# Patient Record
Sex: MALE | Race: BLACK OR AFRICAN AMERICAN | Employment: OTHER | URBAN - METROPOLITAN AREA
[De-identification: names, ages, dates, MRNs, and addresses within clinical notes are randomized per-mention and may not be internally consistent; named-entity substitution may affect disease eponyms.]

---

## 2017-07-07 ENCOUNTER — TELEPHONE (OUTPATIENT)
Dept: FAMILY MEDICINE CLINIC | Age: 62
End: 2017-07-07

## 2017-07-07 ENCOUNTER — OFFICE VISIT (OUTPATIENT)
Dept: FAMILY MEDICINE CLINIC | Age: 62
End: 2017-07-07

## 2017-07-07 VITALS
HEART RATE: 71 BPM | TEMPERATURE: 97.8 F | RESPIRATION RATE: 16 BRPM | OXYGEN SATURATION: 97 % | WEIGHT: 236 LBS | BODY MASS INDEX: 35.77 KG/M2 | HEIGHT: 68 IN | DIASTOLIC BLOOD PRESSURE: 75 MMHG | SYSTOLIC BLOOD PRESSURE: 129 MMHG

## 2017-07-07 DIAGNOSIS — I10 ESSENTIAL HYPERTENSION: Primary | ICD-10-CM

## 2017-07-07 DIAGNOSIS — Z79.4 TYPE 2 DIABETES MELLITUS WITHOUT COMPLICATION, WITH LONG-TERM CURRENT USE OF INSULIN (HCC): ICD-10-CM

## 2017-07-07 DIAGNOSIS — E11.9 TYPE 2 DIABETES MELLITUS WITHOUT COMPLICATION, WITH LONG-TERM CURRENT USE OF INSULIN (HCC): ICD-10-CM

## 2017-07-07 DIAGNOSIS — M25.551 RIGHT HIP PAIN: ICD-10-CM

## 2017-07-07 RX ORDER — INSULIN ASPART 100 [IU]/ML
14 INJECTION, SOLUTION INTRAVENOUS; SUBCUTANEOUS ONCE
COMMUNITY
End: 2020-10-07

## 2017-07-07 RX ORDER — AMLODIPINE BESYLATE 10 MG/1
TABLET ORAL
Qty: 90 TAB | Refills: 3 | Status: CANCELLED | OUTPATIENT
Start: 2017-07-07

## 2017-07-07 NOTE — PROGRESS NOTES
HISTORY OF PRESENT ILLNESS  Saniya Mcclendon is a 58 y.o. male. Chief Complaint   Patient presents with    Follow-up    Diabetes     Type 2, under care of Endocrinology    Hip Pain     right x 3-4 weeks pt reports the pain increases after sitting for long periods of time. Patient denies any pain currently. HPI   Pt is here for follow up of Diabetes Type 2, and is under the care of Endcrinology. Pt does not need medication refills today. New concerns today: Patient states he has been having right hip pain x 3-4 weeks, and increases with prolonged sitting. Review of Systems   Constitutional: Negative. HENT: Negative. Respiratory: Negative. Cardiovascular: Negative. Musculoskeletal: Positive for joint pain. All other systems reviewed and are negative. Physical Exam  Nursing note and vitals reviewed. Constitutional: He is oriented to person, place, and time. He appears well-developed and well-nourished. HENT:   Head: Normocephalic and atraumatic. Right Ear: External ear normal.   Left Ear: External ear normal.   Nose: Nose normal.   Eyes: Conjunctivae and EOM are normal.   Neck: Normal range of motion. Neck supple. No JVD present. Carotid bruit is not present. No thyromegaly present. Cardiovascular: Normal rate, regular rhythm, normal heart sounds and intact distal pulses. Exam reveals no gallop and no friction rub. No murmur heard. Pulmonary/Chest: Effort normal and breath sounds normal. He has no wheezes. He has no rhonchi. He has no rales. Abdominal: Soft. Musculoskeletal: Normal range of motion. Neurological: He is alert and oriented to person, place, and time. Coordination normal.   Skin: Skin is warm and dry. Psychiatric: He has a normal mood and affect. His behavior is normal. Judgment and thought content normal.       ASSESSMENT and PLAN  Mu Woodard was seen today for follow-up, diabetes and hip pain.     Diagnoses and all orders for this visit:    Essential hypertension  Stable, cont pres tx plan. Recent labs with endo. Type 2 diabetes mellitus without complication, with long-term current use of insulin (HCC)  Stable, care as per endo. Recent A1c near goal.      Right hip pain  -     XR HIP RT W OR WO PELV 2-3 VWS; Future  Will f/u as indicated. Discussed meds vs ortho vs PT.         Follow-up Disposition: 3 months; sooner prn

## 2017-07-07 NOTE — PROGRESS NOTES
Faustino Hodgkin 58 y.o. male   Chief Complaint   Patient presents with    Follow-up    Diabetes     Type 2, under care of Endocrinology    Hip Pain     right x 3-4 weeks pt reports the pain increases after sitting for long periods of time. Patient denies any pain currently. 1. Have you been to the ER, urgent care clinic since your last visit? Hospitalized since your last visit? No    2. Have you seen or consulted any other health care providers outside of the [address] [place] [address] since your last visit? Include any pap smears or colon screening.  No

## 2018-10-26 ENCOUNTER — DOCUMENTATION ONLY (OUTPATIENT)
Dept: FAMILY MEDICINE CLINIC | Age: 63
End: 2018-10-26

## 2019-09-10 LAB
HBA1C MFR BLD HPLC: 10.4 %
LDL-C, EXTERNAL: 65

## 2020-08-10 ENCOUNTER — TELEPHONE (OUTPATIENT)
Dept: FAMILY MEDICINE CLINIC | Age: 65
End: 2020-08-10

## 2020-08-10 NOTE — TELEPHONE ENCOUNTER
Called patient related to appointment scheduled for tomorrow. Message left to return call to office.

## 2020-08-11 ENCOUNTER — VIRTUAL VISIT (OUTPATIENT)
Dept: FAMILY MEDICINE CLINIC | Age: 65
End: 2020-08-11

## 2020-08-11 DIAGNOSIS — J01.90 ACUTE NON-RECURRENT SINUSITIS, UNSPECIFIED LOCATION: ICD-10-CM

## 2020-08-11 DIAGNOSIS — R35.1 NOCTURIA MORE THAN TWICE PER NIGHT: ICD-10-CM

## 2020-08-11 DIAGNOSIS — E11.9 TYPE 2 DIABETES MELLITUS WITHOUT COMPLICATION, WITH LONG-TERM CURRENT USE OF INSULIN (HCC): ICD-10-CM

## 2020-08-11 DIAGNOSIS — Z79.4 TYPE 2 DIABETES MELLITUS WITHOUT COMPLICATION, WITH LONG-TERM CURRENT USE OF INSULIN (HCC): ICD-10-CM

## 2020-08-11 DIAGNOSIS — I10 ESSENTIAL HYPERTENSION: Primary | ICD-10-CM

## 2020-08-11 RX ORDER — PEN NEEDLE, DIABETIC 31 GX3/16"
NEEDLE, DISPOSABLE MISCELLANEOUS
COMMUNITY
Start: 2020-05-23 | End: 2020-10-07

## 2020-08-11 RX ORDER — BLOOD SUGAR DIAGNOSTIC
STRIP MISCELLANEOUS
COMMUNITY
Start: 2020-07-07 | End: 2020-10-07

## 2020-08-11 RX ORDER — AMOXICILLIN 875 MG/1
875 TABLET, FILM COATED ORAL 2 TIMES DAILY
Qty: 20 TAB | Refills: 0 | Status: SHIPPED | OUTPATIENT
Start: 2020-08-11 | End: 2020-08-21

## 2020-08-11 RX ORDER — TELMISARTAN 40 MG/1
TABLET ORAL
COMMUNITY
Start: 2020-07-22 | End: 2020-10-07

## 2020-08-11 NOTE — PROGRESS NOTES
Otilio Torres presents today for   Chief Complaint   Patient presents with    Hypertension     Visit to re-establish care and follow up.  Diabetes     Follow up, patient states that he is seeing Dr. Rashmi Henderson regularly (4 weeks ago)    Pressure Behind the Eyes     c/o sinus pressure for about 3 weeks, states drainage at times, thick at times and has an odor at times. Otilio Torres preferred language for health care discussion is english/other. Is someone accompanying this pt? Wife Audra Ruffin. Is the patient using any DME equipment during OV? No. Patient states that he uses his wife as a guide due to blindness. Depression Screening:  3 most recent PHQ Screens 8/11/2020   Little interest or pleasure in doing things Not at all   Feeling down, depressed, irritable, or hopeless Not at all   Total Score PHQ 2 0       Learning Assessment:  Learning Assessment 6/13/2014   PRIMARY LEARNER Patient   HIGHEST LEVEL OF EDUCATION - PRIMARY LEARNER  DID NOT GRADUATE [facility] PRIMARY LEARNER VISUAL     READING   [address]  CAREGIVER Yes   CO-LEARNER NAME [address] [facility] LEVEL OF EDUCATION 2 YEARS OF COLLEGE   BARRIERS CO-LEARNER NONE   PRIMARY LANGUAGE ENGLISH   PRIMARY LANGUAGE CO-LEARNER ENGLISH    NEED No   LEARNER PREFERENCE PRIMARY DEMONSTRATION     LISTENING   LEARNER PREFERENCE CO-LEARNER READING   LEARNING SPECIAL TOPICS none   ANSWERED BY patient   RELATIONSHIP SELF       Abuse Screening:  Abuse Screening Questionnaire 8/11/2020   Do you ever feel afraid of your partner? N   Are you in a relationship with someone who physically or mentally threatens you? N   Is it safe for you to go home? Y       Fall Risk  Fall Risk Assessment, last 12 mths 8/11/2020   Able to walk? Yes   Fall in past 12 months? No         Coordination of Care:  1. Have you been to the ER, urgent care clinic since your last visit? Hospitalized since your last visit? No    2.  Have you seen or consulted any other health care providers outside of the [facility] since your last visit? Include any pap smears or colon screening. Patient states that he sees Dr. Diego Fernandez regularly related to diabetes. Most recent visit 4 weeks ago. Advance Directive:  1. Do you have an advance directive in place? Patient Reply:No    2. If not, would you like material regarding how to put one in place?  Patient Reply: No

## 2020-08-11 NOTE — PROGRESS NOTES
Doug Dobbs is a 72 y.o. male, evaluated via audio-only technology on 8/11/2020 for Hypertension (Visit to re-establish care and follow up. ); Diabetes (Follow up, patient states that he is seeing Dr. Suha Pro regularly (4 weeks ago)); and Pressure Behind the Eyes (c/o sinus pressure for about 3 weeks, states drainage at times, thick at times and has an odor at times. )  . Assessment & Plan:   Diagnoses and all orders for this visit:    1. Essential hypertension  Stable, cont pres tx plan. 2. Nocturia more than twice per night  -     REFERRAL TO UROLOGY  Suspect bph.    3. Type 2 diabetes mellitus without complication, with long-term current use of insulin ([facility] [address]  Care as per endo    4. Acute non-recurrent sinusitis, unspecified location  -     amoxicillin (AMOXIL) 875 mg tablet; Take 1 Tab by mouth two (2) times a day for 10 days. The complexity of medical decision making for this visit is moderate   Follow-up and Dispositions    · Return in about 3 months (around 11/11/2020), or if symptoms worsen or fail to improve, for high blood pressure, diabetes. 12  Subjective:   Pt wants to re-establish care. Pt has been seeing endo regularly. His dm is well controlled. His last visit was about 4 wks ago. Am blood sugar readings are usually around 115. Before lunch, he is up slightly to 120-130s. Pt has not been checking bp readings at home. They will consider getting a new bp cuff. Pt reports low back pain that is worse at night. It goes around to the bottom of his stomach. Pt is having to urinate 8-9 times during the night. He does not go as often during the day. Pt does not have any trouble initiating his stream.  He does not always feel like he empties completely; he may have to strain to finish emptying his bladder. He does not have much terminal dribbling. Pt c/o sinus pressure with drainage. He feels very congested. Pt has pnd with cough.   The cough is productive at times. The pain is behind his nose, between his eyes. No pain in the maxillary sinuses, upper teeth, or frontal sinus. He has been using an otc sinus prep without much relief. Prior to Admission medications    Medication Sig Start Date End Date Taking? Authorizing Provider   telmisartan (MICARDIS) 40 mg tablet take 1 tablet by mouth once daily 7/22/20  Yes Provider, Historical   Droplet Pen Needle 31 gauge x 3/16\" ndle  5/23/20  Yes Provider, Historical   Accu-Chek Gisele Plus test strp strip  7/7/20  Yes Provider, Historical   insulin aspart, niacinamide, (FIASP U-100 INSULIN SC) by SubCUTAneous route. Yes Provider, Historical   amoxicillin (AMOXIL) 875 mg tablet Take 1 Tab by mouth two (2) times a day for 10 days. 8/11/20 8/21/20 Yes Carl Dillard MD TOUJEO SOLOSTAR 300 unit/mL (1.5 mL) inpn 60 Units by SubCUTAneous route nightly. 6/24/16  Yes Provider, Historical   hydrochlorothiazide (HYDRODIURIL) 25 mg tablet TAKE 1 TABLET EVERY DAY 5/13/15  Yes Carl Dillard MD   amLODIPine (NORVASC) 10 mg tablet TAKE 1 TABLET EVERY DAY 5/13/15  Yes Mart Iraheta MD   ALCOHOL PREP PADS padm 1 each. Cleanse skin area prior to injection and prior to testing blood sugar daily as directed 7/29/14  Yes Provider, Historical   SOLUS V2 LANCING DEVICE kit 1 Each two (2) times a day. Test blood sugar .02 4/25/14  Yes Provider, Historical   SHERLEY PEN NEEDLE 32 x 5/32 \" ndle 30 Units by SubCUTAneous route daily. At bedtime 5/12/14  Yes Provider, Historical   glimepiride (AMARYL) 2 mg tablet 2 mg every morning. Indications: TYPE 2 DIABETES MELLITUS 5/22/14  Yes Provider, Historical   metFORMIN (GLUCOPHAGE) 1,000 mg tablet Take 1 Tab by mouth two (2) times daily (with meals). Indications: TYPE 2 DIABETES MELLITUS 5/24/13  Yes Carl Dillard MD   PYRIDOXINE HCL, VITAMIN B6, (PYRIDOXINE, VITAMIN B6, PO) Take  by mouth.     Provider, Historical   insulin aspart (NOVOLOG) 100 unit/mL injection 14 Units by SubCUTAneous route once. At dinner time    Provider, Historical   polyethylene glycol (MIRALAX) 17 gram packet MIX CONTENT OF 1 PACKET IN 4 TO 8 OUNCES OF LIQUID AND DRINK DAILY FOR UP TO 7 DAYS. MAY NEED 1 TO 3 DAYS FOR RESULTS 5/13/15   Liza Salmon MD   b complex vitamins (B COMPLEX-VITAMIN B12) tablet Take 1 tablet by mouth daily. Provider, Historical   FOLIC ACID PO Take  by mouth daily. Provider, Historical   Cholecalciferol, Vitamin D3, 5,000 unit Tab Take 5,000 Units by mouth daily. Provider, Historical   MEN'S MULTI-VITAMIN PO Take  by mouth daily. Provider, Historical   aspirin 81 mg tablet Take 81 mg by mouth daily. 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  amoxicillin (AMOXIL) 875 mg tablet Take 1 Tab by mouth two (2) times a day for 10 days. 20 Tab 0    TOUJEO SOLOSTAR 300 unit/mL (1.5 mL) inpn 60 Units by SubCUTAneous route nightly.  hydrochlorothiazide (HYDRODIURIL) 25 mg tablet TAKE 1 TABLET EVERY DAY 90 Tab 3    amLODIPine (NORVASC) 10 mg tablet TAKE 1 TABLET EVERY DAY 90 Tab 3    ALCOHOL PREP PADS padm 1 each. Cleanse skin area prior to injection and prior to testing blood sugar daily as directed      SOLUS V2 LANCING DEVICE kit 1 Each two (2) times a day. Test blood sugar .02      SHERLEY PEN NEEDLE 32 x 5/32 \" ndle 30 Units by SubCUTAneous route daily.  At bedtime      glimepiride (AMARYL) 2 mg tablet 2 mg every morning. Indications: TYPE 2 DIABETES MELLITUS      metFORMIN (GLUCOPHAGE) 1,000 mg tablet Take 1 Tab by mouth two (2) times daily (with meals). Indications: TYPE 2 DIABETES MELLITUS 180 Tab 3    PYRIDOXINE HCL, VITAMIN B6, (PYRIDOXINE, VITAMIN B6, PO) Take  by mouth.  insulin aspart (NOVOLOG) 100 unit/mL injection 14 Units by SubCUTAneous route once. At dinner time      polyethylene glycol (MIRALAX) 17 gram packet MIX CONTENT OF 1 PACKET IN 4 TO 8 OUNCES OF LIQUID AND DRINK DAILY FOR UP TO 7 DAYS. MAY NEED 1 TO 3 DAYS FOR RESULTS 16 Packet 3    b complex vitamins (B COMPLEX-VITAMIN B12) tablet Take 1 tablet by mouth daily.  FOLIC ACID PO Take  by mouth daily.  Cholecalciferol, Vitamin D3, 5,000 unit Tab Take 5,000 Units by mouth daily.  MEN'S MULTI-VITAMIN PO Take  by mouth daily.  aspirin 81 mg tablet Take 81 mg by mouth daily. No Known Allergies  Past Medical History:   Diagnosis Date    Diabetes ([facility] [address]     Hypertension     Legally blind 2007    both eyes    Retinitis pigmentosa of both eyes      Past Surgical History:   Procedure Laterality Date    HX WISDOM TEETH EXTRACTION      x4     Family History   Problem Relation Age of Onset    Heart Disease Mother     Heart Failure Mother     Kidney Disease Father     Diabetes Father     Cancer Father         prostate    Diabetes Sister     Hypertension Sister     Obesity Sister     Cancer Brother 52        prostate     Social History     Tobacco Use    Smoking status: Never Smoker    Smokeless tobacco: Never Used   Substance Use Topics    Alcohol use: No       Review of Systems   Constitutional: Negative. HENT: Negative. Respiratory: Negative. Cardiovascular: Negative. Genitourinary: Positive for flank pain. Nocturia   Musculoskeletal: Positive for back pain. All other systems reviewed and are negative. No flowsheet data found. Chari Newman, who was evaluated through a patient-initiated, synchronous (real-time) audio only encounter, and/or her healthcare decision maker, is aware that it is a billable service, with coverage as determined by his insurance carrier. He provided verbal consent to proceed: Yes. He has not had a related appointment within my department in the past 7 days or scheduled within the next 24 hours.       Total Time: minutes: 11-20 minutes    Merly Morales MD

## 2020-09-22 ENCOUNTER — HOSPITAL ENCOUNTER (EMERGENCY)
Age: 65
Discharge: HOME | End: 2020-09-22
Attending: EMERGENCY MEDICINE
Payer: MEDICARE

## 2020-09-22 ENCOUNTER — APPOINTMENT (OUTPATIENT)
Dept: GENERAL RADIOLOGY | Age: 65
End: 2020-09-22
Attending: EMERGENCY MEDICINE
Payer: MEDICARE

## 2020-09-22 VITALS
WEIGHT: 240 LBS | HEART RATE: 92 BPM | DIASTOLIC BLOOD PRESSURE: 74 MMHG | TEMPERATURE: 98.2 F | HEIGHT: 68 IN | SYSTOLIC BLOOD PRESSURE: 182 MMHG | BODY MASS INDEX: 36.37 KG/M2 | OXYGEN SATURATION: 100 % | RESPIRATION RATE: 16 BRPM

## 2020-09-22 DIAGNOSIS — M77.50 BONE SPUR OF FOOT: Primary | ICD-10-CM

## 2020-09-22 LAB
ANION GAP SERPL CALC-SCNC: 8 MMOL/L (ref 3–18)
BASOPHILS # BLD: 0 K/UL (ref 0–0.1)
BASOPHILS NFR BLD: 0 % (ref 0–2)
BUN SERPL-MCNC: 24 MG/DL (ref 7–18)
BUN/CREAT SERPL: 19 (ref 12–20)
CALCIUM SERPL-MCNC: 9.9 MG/DL (ref 8.5–10.1)
CHLORIDE SERPL-SCNC: 107 MMOL/L (ref 100–111)
CO2 SERPL-SCNC: 25 MMOL/L (ref 21–32)
CREAT SERPL-MCNC: 1.26 MG/DL (ref 0.6–1.3)
D DIMER PPP FEU-MCNC: 0.83 UG/ML(FEU)
DIFFERENTIAL METHOD BLD: ABNORMAL
EOSINOPHIL # BLD: 0 K/UL (ref 0–0.4)
EOSINOPHIL NFR BLD: 0 % (ref 0–5)
ERYTHROCYTE [DISTWIDTH] IN BLOOD BY AUTOMATED COUNT: 13.8 % (ref 11.6–14.5)
GLUCOSE SERPL-MCNC: 198 MG/DL (ref 74–99)
HCT VFR BLD AUTO: 38.1 % (ref 36–48)
HGB BLD-MCNC: 12.7 G/DL (ref 13–16)
LYMPHOCYTES # BLD: 0.9 K/UL (ref 0.9–3.6)
LYMPHOCYTES NFR BLD: 11 % (ref 21–52)
MCH RBC QN AUTO: 32.4 PG (ref 24–34)
MCHC RBC AUTO-ENTMCNC: 33.3 G/DL (ref 31–37)
MCV RBC AUTO: 97.2 FL (ref 74–97)
MONOCYTES # BLD: 0.6 K/UL (ref 0.05–1.2)
MONOCYTES NFR BLD: 8 % (ref 3–10)
NEUTS SEG # BLD: 6.3 K/UL (ref 1.8–8)
NEUTS SEG NFR BLD: 81 % (ref 40–73)
PLATELET # BLD AUTO: 155 K/UL (ref 135–420)
PMV BLD AUTO: 12.2 FL (ref 9.2–11.8)
POTASSIUM SERPL-SCNC: 4.8 MMOL/L (ref 3.5–5.5)
RBC # BLD AUTO: 3.92 M/UL (ref 4.7–5.5)
SODIUM SERPL-SCNC: 140 MMOL/L (ref 136–145)
WBC # BLD AUTO: 7.8 K/UL (ref 4.6–13.2)

## 2020-09-22 PROCEDURE — 74011250637 HC RX REV CODE- 250/637: Performed by: EMERGENCY MEDICINE

## 2020-09-22 PROCEDURE — 85025 COMPLETE CBC W/AUTO DIFF WBC: CPT

## 2020-09-22 PROCEDURE — 85379 FIBRIN DEGRADATION QUANT: CPT

## 2020-09-22 PROCEDURE — 99283 EMERGENCY DEPT VISIT LOW MDM: CPT

## 2020-09-22 PROCEDURE — 73630 X-RAY EXAM OF FOOT: CPT

## 2020-09-22 PROCEDURE — 80048 BASIC METABOLIC PNL TOTAL CA: CPT

## 2020-09-22 RX ORDER — HYDROCODONE BITARTRATE AND ACETAMINOPHEN 5; 325 MG/1; MG/1
1 TABLET ORAL
Qty: 10 TAB | Refills: 0 | Status: SHIPPED | OUTPATIENT
Start: 2020-09-22 | End: 2020-09-25

## 2020-09-22 RX ORDER — HYDROCODONE BITARTRATE AND ACETAMINOPHEN 5; 325 MG/1; MG/1
1 TABLET ORAL
Status: COMPLETED | OUTPATIENT
Start: 2020-09-22 | End: 2020-09-22

## 2020-09-22 RX ADMIN — HYDROCODONE BITARTRATE AND ACETAMINOPHEN 1 TABLET: 5; 325 TABLET ORAL at 09:35

## 2020-09-22 NOTE — ED PROVIDER NOTES
EMERGENCY DEPARTMENT HISTORY AND PHYSICAL EXAM    8:53 AM      Date: 9/22/2020  Patient Name: Chrisandra Boeck    History of Presenting Illness     Chief Complaint   Patient presents with    Foot Pain    Foot Swelling         History Provided By: Patient    Additional History (Context): Chrisandra Boeck is a 72 y.o. male with diabetes and hypertension who presents with foot pain. Patient states began 3 days ago. He denies any direct injury or twisting of his foot. He denies redness. Pain is sharp, 10 out of 10, nonradiating, nothing makes better or worse. Patient denies chest pain, cough, fever, nausea, vomiting or diarrhea. Patient denies smoking alcohol or recreational drug use. PCP: Marizol Verma MD        Current Outpatient Medications   Medication Sig Dispense Refill    HYDROcodone-acetaminophen (NORCO) 5-325 mg per tablet Take 1 Tab by mouth every four (4) hours as needed for Pain for up to 3 days. Max Daily Amount: 6 Tabs. 10 Tab 0    telmisartan (MICARDIS) 40 mg tablet take 1 tablet by mouth once daily      insulin aspart, niacinamide, (FIASP U-100 INSULIN SC) by SubCUTAneous route.  insulin aspart (NOVOLOG) 100 unit/mL injection 14 Units by SubCUTAneous route once. At dinner time      TOUJEO SOLOSTAR 300 unit/mL (1.5 mL) inpn 60 Units by SubCUTAneous route nightly.  hydrochlorothiazide (HYDRODIURIL) 25 mg tablet TAKE 1 TABLET EVERY DAY 90 Tab 3    polyethylene glycol (MIRALAX) 17 gram packet MIX CONTENT OF 1 PACKET IN 4 TO 8 OUNCES OF LIQUID AND DRINK DAILY FOR UP TO 7 DAYS. MAY NEED 1 TO 3 DAYS FOR RESULTS 16 Packet 3    amLODIPine (NORVASC) 10 mg tablet TAKE 1 TABLET EVERY DAY 90 Tab 3    metFORMIN (GLUCOPHAGE) 1,000 mg tablet Take 1 Tab by mouth two (2) times daily (with meals). Indications: TYPE 2 DIABETES MELLITUS 180 Tab 3    aspirin 81 mg tablet Take 81 mg by mouth daily.       Droplet Pen Needle 31 gauge x 3/16\" ndle       Accu-Chek Gisele Plus test strp strip       ALCOHOL PREP PADS padm 1 each. Cleanse skin area prior to injection and prior to testing blood sugar daily as directed      SOLUS V2 LANCING DEVICE kit 1 Each two (2) times a day. Test blood sugar .02      SHERLEY PEN NEEDLE 32 x 5/32 \" ndle 30 Units by SubCUTAneous route daily. At bedtime         Past History     Past Medical History:  Past Medical History:   Diagnosis Date    Diabetes ([address]     DJD (degenerative joint disease) of knee     History of impotence     Hypertension     Legally blind 2007    both eyes    Proteinuria     Retinitis pigmentosa of both eyes        Past Surgical History:  Past Surgical History:   Procedure Laterality Date    HX WISDOM TEETH EXTRACTION      x4       Family History:  Family History   Problem Relation Age of Onset    Heart Disease Mother     Heart Failure Mother     Kidney Disease Father     Diabetes Father     Cancer Father         prostate    Diabetes Sister     Hypertension Sister     Obesity Sister     Cancer Brother 52        prostate    Prostate Cancer Paternal Uncle        Social History:  Social History     Tobacco Use    Smoking status: Never Smoker    Smokeless tobacco: Never Used   Substance Use Topics    Alcohol use: No    Drug use: Yes     Types: Prescription, OTC       Allergies:  No Known Allergies      Review of Systems       Review of Systems   Constitutional: Negative. Negative for chills, diaphoresis and fever. HENT: Negative. Negative for congestion, rhinorrhea and sore throat. Eyes: Negative. Negative for pain, discharge and redness. Respiratory: Negative. Negative for cough, chest tightness, shortness of breath and wheezing. Cardiovascular: Negative. Negative for chest pain. Gastrointestinal: Negative. Negative for abdominal pain, constipation, diarrhea, nausea and vomiting. Genitourinary: Negative. Negative for dysuria, flank pain, frequency, hematuria and urgency.    Musculoskeletal: Positive for joint swelling. Negative for back pain and neck pain. Skin: Negative. Negative for rash. Neurological: Negative. Negative for syncope, weakness, numbness and headaches. Psychiatric/Behavioral: Negative. All other systems reviewed and are negative. Physical Exam     Visit Vitals  BP (!) 182/74   Pulse 92   Temp 98.2 °F (36.8 °C)   Resp 16   Ht 5' 8\" (1.727 m)   Wt 108.9 kg (240 lb)   SpO2 100%   BMI 36.49 kg/m²         Physical Exam  Vitals signs and nursing note reviewed. Constitutional:       General: He is not in acute distress. Appearance: Normal appearance. He is well-developed. He is not ill-appearing, toxic-appearing or diaphoretic. HENT:      Head: Normocephalic and atraumatic. Mouth/Throat:      Pharynx: No oropharyngeal exudate. Eyes:      General: No scleral icterus. Conjunctiva/sclera: Conjunctivae normal.      Pupils: Pupils are equal, round, and reactive to light. Neck:      Musculoskeletal: Normal range of motion and neck supple. Thyroid: No thyromegaly. Vascular: No hepatojugular reflux or JVD. Trachea: No tracheal deviation. Cardiovascular:      Rate and Rhythm: Normal rate and regular rhythm. Pulses: Normal pulses. Radial pulses are 2+ on the right side and 2+ on the left side. Dorsalis pedis pulses are 2+ on the right side and 2+ on the left side. Heart sounds: Normal heart sounds, S1 normal and S2 normal. No murmur. No gallop. No S3 or S4 sounds. Pulmonary:      Effort: Pulmonary effort is normal. No respiratory distress. Breath sounds: Normal breath sounds. No decreased breath sounds, wheezing, rhonchi or rales. Abdominal:      General: Bowel sounds are normal. There is no distension. Palpations: Abdomen is soft. Abdomen is not rigid. There is no mass. Tenderness: There is no abdominal tenderness. There is no guarding or rebound. Negative signs include Florez's sign and McBurney's sign. Musculoskeletal: Normal range of motion. Feet:    Lymphadenopathy:      Head:      Right side of head: No submental, submandibular, preauricular or occipital adenopathy. Left side of head: No submental, submandibular, preauricular or occipital adenopathy. Cervical: No cervical adenopathy. Upper Body:      Right upper body: No supraclavicular adenopathy. Left upper body: No supraclavicular adenopathy. Skin:     General: Skin is warm and dry. Findings: No rash. Neurological:      Mental Status: He is alert. He is not disoriented. GCS: GCS eye subscore is 4. GCS verbal subscore is 5. GCS motor subscore is 6. Cranial Nerves: No cranial nerve deficit. Sensory: No sensory deficit. Coordination: Coordination normal.      Gait: Gait normal.      Deep Tendon Reflexes: Reflexes are normal and symmetric. Comments: Grossly intact. Psychiatric:         Speech: Speech normal.         Behavior: Behavior normal.         Thought Content: Thought content normal.         Judgment: Judgment normal.           Diagnostic Study Results     Labs -  Recent Results (from the past 12 hour(s))   CBC WITH AUTOMATED DIFF    Collection Time: 09/22/20  9:14 AM   Result Value Ref Range    WBC 7.8 4.6 - 13.2 K/uL    RBC 3.92 (L) 4.70 - 5.50 M/uL    HGB 12.7 (L) 13.0 - 16.0 g/dL    HCT 38.1 36.0 - 48.0 %    MCV 97.2 (H) 74.0 - 97.0 FL    MCH 32.4 24.0 - 34.0 PG    MCHC 33.3 31.0 - 37.0 g/dL    RDW 13.8 11.6 - 14.5 %    PLATELET 674 035 - 614 K/uL    MPV 12.2 (H) 9.2 - 11.8 FL    NEUTROPHILS 81 (H) 40 - 73 %    LYMPHOCYTES 11 (L) 21 - 52 %    MONOCYTES 8 3 - 10 %    EOSINOPHILS 0 0 - 5 %    BASOPHILS 0 0 - 2 %    ABS. NEUTROPHILS 6.3 1.8 - 8.0 K/UL    ABS. LYMPHOCYTES 0.9 0.9 - 3.6 K/UL    ABS. MONOCYTES 0.6 0.05 - 1.2 K/UL    ABS. EOSINOPHILS 0.0 0.0 - 0.4 K/UL    ABS.  BASOPHILS 0.0 0.0 - 0.1 K/UL    DF AUTOMATED     METABOLIC PANEL, BASIC    Collection Time: 09/22/20  9:14 AM   Result Value Ref Range    Sodium 140 136 - 145 mmol/L    Potassium 4.8 3.5 - 5.5 mmol/L    Chloride 107 100 - 111 mmol/L    CO2 25 21 - 32 mmol/L    Anion gap 8 3.0 - 18 mmol/L    Glucose 198 (H) 74 - 99 mg/dL    BUN 24 (H) 7.0 - 18 MG/DL    Creatinine 1.26 0.6 - 1.3 MG/DL    BUN/Creatinine ratio 19 12 - 20      GFR est AA >60 >60 ml/min/1.73m2    GFR est non-AA 57 (L) >60 ml/min/1.73m2    Calcium 9.9 8.5 - 10.1 MG/DL   D DIMER    Collection Time: 09/22/20  9:14 AM   Result Value Ref Range    D DIMER 0.83 (H) <0.46 ug/ml(FEU)       Radiologic Studies -   XR FOOT RT MIN 3 V    (Results Pending)         Medical Decision Making   Provider Notes (Medical Decision Making):  MDM  Number of Diagnoses or Management Options  Diagnosis management comments: Contusion  Sprain    Gout  Neoplasm   Osteomyelitis         I am the first provider for this patient. I reviewed the vital signs, available nursing notes, past medical history, past surgical history, family history and social history. Vital Signs-Reviewed the patient's vital signs. Records Reviewed: Nursing Notes (Time of Review: 8:53 AM)    ED Course: Progress Notes, Reevaluation, and Consults:    Labs essentially normal.  X-Ray right no acute fracture. Possible spur on the right cuboid bone. Related to area of pain is a foreign body on the distal third fibula. 9:33 AM 9/22/2020      Diagnosis       I have reassessed the patient. Patient is feeling well. Patient will be prescribed Vicodin. Patient was discharged in stable condition. Patient is to return to emergency department if any new or worsening condition. Clinical Impression:   1.  Bone spur of foot        Disposition: Discharged home     Follow-up Information     Follow up With Specialties Details Why Contact Info    Kofi Wood MD Family Medicine In 2 days  [address] [facility]  [phone]               Attestation        Provider Attestation:     I personally performed the services described in the documentation, reviewed the documentation and it accurately and completely records my words and actions utilizing the [address] [place] September 22, 2020 at 9:40 AM - Vero Felix DO    Disclaimer. It is dictated using utilizing voice recognition software. Unfortunately this leads to occasional typographical errors. I apologize in advance if the situation occurs. If questions arise please do not hesitate to contact me or call our department.

## 2020-09-22 NOTE — ED NOTES
Current Discharge Medication List      START taking these medications    Details   HYDROcodone-acetaminophen (NORCO) 5-325 mg per tablet Take 1 Tab by mouth every four (4) hours as needed for Pain for up to 3 days. Max Daily Amount: 6 Tabs. Qty: 10 Tab, Refills: 0    Associated Diagnoses: Bone spur of foot         Patient armband removed and shredded. Prescription given and reviewed with patient.

## 2020-09-23 ENCOUNTER — PATIENT OUTREACH (OUTPATIENT)
Dept: CASE MANAGEMENT | Age: 65
End: 2020-09-23

## 2020-09-23 NOTE — PROGRESS NOTES
Complex Case Management      Date/Time:  2020 9:36 AM    Method of communication with patient:phone    [address] [facility] ([facility]) contacted the patient by telephone to perform Ambulatory Care Coordination. Verified name and  (PHI) with patient as identifiers. Provided introduction to self, and explanation of the Ambulatory Care Manager's role. Reviewed most recent clinic visit w/ patient who verbalized understanding. Patient given an opportunity to ask questions. Top Challenges reviewed with the patient   1. Recent visit to ED for bone spurs, given pain meds, to f/u w/ PCP. Next appt is not until November. Of note, pt w/ elevated BP in ED as well. The patient agrees to contact the PCP office or the [facility] for questions related to their healthcare. Provided contact information for future reference. Disease Specific:   N/A    Home Health Active: No    DME Active: No    Barriers to care? lack of knowledge about disease    Advance Care Planning:   Does patient have an Advance Directive:  not on file; education provided     Medication(s):   Medication reconciliation was not performed with patient who declined, but verbalizes understanding of administration of home medications. There were no barriers to obtaining medications identified at this time. Referral to Pharm D needed: no     Current Outpatient Medications   Medication Sig    HYDROcodone-acetaminophen (NORCO) 5-325 mg per tablet Take 1 Tab by mouth every four (4) hours as needed for Pain for up to 3 days. Max Daily Amount: 6 Tabs.  telmisartan (MICARDIS) 40 mg tablet take 1 tablet by mouth once daily    Droplet Pen Needle 31 gauge x 3/16\" ndle     Accu-Chek Gisele Plus test strp strip     insulin aspart, niacinamide, (FIASP U-100 INSULIN SC) by SubCUTAneous route.  insulin aspart (NOVOLOG) 100 unit/mL injection 14 Units by SubCUTAneous route once.  At dinner time    TOUJEO SOLOSTAR 300 unit/mL (1.5 mL) inpn 60 Units by SubCUTAneous route nightly.  hydrochlorothiazide (HYDRODIURIL) 25 mg tablet TAKE 1 TABLET EVERY DAY    polyethylene glycol (MIRALAX) 17 gram packet MIX CONTENT OF 1 PACKET IN 4 TO 8 OUNCES OF LIQUID AND DRINK DAILY FOR UP TO 7 DAYS. MAY NEED 1 TO 3 DAYS FOR RESULTS    amLODIPine (NORVASC) 10 mg tablet TAKE 1 TABLET EVERY DAY    ALCOHOL PREP PADS padm 1 each. Cleanse skin area prior to injection and prior to testing blood sugar daily as directed    SOLUS V2 LANCING DEVICE kit 1 Each two (2) times a day. Test blood sugar .02    SHERLEY PEN NEEDLE 32 x 5/32 \" ndle 30 Units by SubCUTAneous route daily. At bedtime    metFORMIN (GLUCOPHAGE) 1,000 mg tablet Take 1 Tab by mouth two (2) times daily (with meals). Indications: TYPE 2 DIABETES MELLITUS    aspirin 81 mg tablet Take 81 mg by mouth daily. No current facility-administered medications for this visit. [facility] follow up appointment(s):   Future Appointments   Date Time Provider Grace Frenchi   10/7/2020  3:30 PM [facility]   11/3/2020 10:15 AM [facility]        Non-[facility] follow up appointment(s): n/a    Goals Addressed                 This Visit's Progress     Attends follow up appointments on schedule        9/23/20 Patient will attend all scheduled appointments through 12/23/20.  Knowledge and adherence of prescribed medication (ie. action, side effects, missed dose, etc.).        9/23/20 Pt will take all medications prescribed to be evaluated on each outreach through 12/23/20.

## 2020-09-28 ENCOUNTER — PATIENT OUTREACH (OUTPATIENT)
Dept: CASE MANAGEMENT | Age: 65
End: 2020-09-28

## 2020-10-05 PROBLEM — R39.14 FEELING OF INCOMPLETE BLADDER EMPTYING: Status: ACTIVE | Noted: 2020-10-05

## 2020-10-05 PROBLEM — R30.0 STRANGURIA: Status: ACTIVE | Noted: 2020-10-05

## 2020-10-05 PROBLEM — N40.1 ENLARGED PROSTATE WITH LOWER URINARY TRACT SYMPTOMS (LUTS): Status: ACTIVE | Noted: 2020-10-05

## 2020-10-05 PROBLEM — R35.1 NOCTURIA: Status: ACTIVE | Noted: 2020-10-05

## 2020-10-07 PROBLEM — N52.1 ERECTILE DYSFUNCTION ASSOCIATED WITH TYPE 2 DIABETES MELLITUS (HCC): Status: ACTIVE | Noted: 2020-10-07

## 2020-10-07 PROBLEM — Z80.42 FAMILY HISTORY OF PROSTATE CANCER: Status: ACTIVE | Noted: 2020-10-07

## 2020-10-07 PROBLEM — E11.69 ERECTILE DYSFUNCTION ASSOCIATED WITH TYPE 2 DIABETES MELLITUS (HCC): Status: ACTIVE | Noted: 2020-10-07

## 2020-10-12 ENCOUNTER — PATIENT OUTREACH (OUTPATIENT)
Dept: CASE MANAGEMENT | Age: 65
End: 2020-10-12

## 2020-10-12 NOTE — PROGRESS NOTES
Patient attended appointments with his Urologist, Dr Walker Nevarez. on 10/7/20, Nurse Navigator reviewed EMR and will follow up on next scheduled outreach.

## 2020-10-13 ENCOUNTER — PATIENT OUTREACH (OUTPATIENT)
Dept: CASE MANAGEMENT | Age: 65
End: 2020-10-13

## 2020-10-13 NOTE — PROGRESS NOTES
Complex Case Management      Date/Time:  10/13/2020 9:36 AM    Method of communication with patient:phone    [facility] ([facility]) contacted the patient by telephone to perform Ambulatory Care Coordination. Verified name and  (PHI) with patient as identifiers. Provided introduction to self, and explanation of the Ambulatory Care Manager's role. Reviewed most recent clinic visit w/ patient who verbalized understanding. Patient given an opportunity to ask questions. Top Challenges reviewed with the patient   1. Recent visit to ED for bone spurs, given pain meds, to f/u w/ PCP. Next appt is not until November. Of note, pt w/ elevated BP in ED as well. 2. Spoke to pt's wife who reported no questions following recent Uro appt. Reports able to get newly prescribed Flomax. 3. Wife stated no issues/questions at this time. The patient agrees to contact the PCP office or the [facility] for questions related to their healthcare. Provided contact information for future reference. Disease Specific:   N/A    Home Health Active: No    DME Active: No    Barriers to care? lack of knowledge about disease    Advance Care Planning:   Does patient have an Advance Directive:  not on file; education provided     Medication(s):   Medication reconciliation was not performed with patient who declined, but verbalizes understanding of administration of home medications. There were no barriers to obtaining medications identified at this time. Referral to Pharm D needed: no     Current Outpatient Medications   Medication Sig    tamsulosin (FLOMAX) 0.4 mg capsule Take 1 Cap by mouth daily.  TOUJEO SOLOSTAR 300 unit/mL (1.5 mL) inpn 60 Units by SubCUTAneous route nightly.  hydrochlorothiazide (HYDRODIURIL) 25 mg tablet TAKE 1 TABLET EVERY DAY    amLODIPine (NORVASC) 10 mg tablet TAKE 1 TABLET EVERY DAY    metFORMIN (GLUCOPHAGE) 1,000 mg tablet Take 1 Tab by mouth two (2) times daily (with meals). Indications: TYPE 2 DIABETES MELLITUS    aspirin 81 mg tablet Take 81 mg by mouth daily. No current facility-administered medications for this visit. [facility] follow up appointment(s):   Future Appointments   Date Time Provider Grace Saloni   11/3/2020 10:15 AM Hair Elizabeth MD [facility] BS AMB   12/14/2020  1:15 PM Eber Strauss MD [facility] OpenPM        Non-[facility] follow up appointment(s): n/a    Goals Addressed                 This Visit's Progress     Attends follow up appointments on schedule   On track     9/23/20 Patient will attend all scheduled appointments through 12/23/20.  Knowledge and adherence of prescribed medication (ie. action, side effects, missed dose, etc.). On track     9/23/20 Pt will take all medications prescribed to be evaluated on each outreach through 12/23/20.  Patient/Family verbalizes understanding of self-management of chronic disease.    On track

## 2020-10-22 ENCOUNTER — TELEPHONE (OUTPATIENT)
Dept: PHARMACY | Age: 65
End: 2020-10-22

## 2020-10-22 NOTE — TELEPHONE ENCOUNTER
Patient on diabetic medications, but no statin prescribed. No adverse history of statin usage noted on patient chart. Called patient to determine where he is getting metformin filled. Stated that he gets it from [facility] mail order and has supply on hand. Also stated that he was on statin at one point, but MD decided he did not need it and took him off.      CLINICAL PHARMACY CONSULT: MED RECONCILIATION/REVIEW ADDENDUM    For Pharmacy Admin Tracking Only    PHSO: PHSO Patient?: Yes  Total # of Interventions Recommended: Count: 1  Total Interventions Accepted: 1  Time Spent (min): [address] [facility]

## 2020-10-26 ENCOUNTER — PATIENT OUTREACH (OUTPATIENT)
Dept: CASE MANAGEMENT | Age: 65
End: 2020-10-26

## 2020-10-26 NOTE — PROGRESS NOTES
Complex Case Management      Date/Time:  10/26/2020 9:36 AM    Method of communication with patient:phone    [facility] ([facility]) contacted the patient by telephone to perform Ambulatory Care Coordination. Verified name and  (PHI) with patient as identifiers. Provided introduction to self, and explanation of the Ambulatory Care Manager's role. Reviewed most recent clinic visit w/ patient who verbalized understanding. Patient given an opportunity to ask questions. Top Challenges reviewed with the patient   1. Spoke to patient, states doing well. No issues w/ new rx of Flomax. 2. Pt states intention to go to PCP appt next week. 3. No issues/questions at this time. The patient agrees to contact the PCP office or the [facility] for questions related to their healthcare. Provided contact information for future reference. Disease Specific:   N/A    Home Health Active: No    DME Active: No    Barriers to care? lack of knowledge about disease    Advance Care Planning:   Does patient have an Advance Directive:  not on file; education provided     Medication(s):   Medication reconciliation was performed with patient who verbalizes understanding of administration of home medications. There were no barriers to obtaining medications identified at this time. Referral to Pharm D needed: no     Current Outpatient Medications   Medication Sig    tamsulosin (FLOMAX) 0.4 mg capsule Take 1 Cap by mouth daily.  TOUJEO SOLOSTAR 300 unit/mL (1.5 mL) inpn 60 Units by SubCUTAneous route nightly.  hydrochlorothiazide (HYDRODIURIL) 25 mg tablet TAKE 1 TABLET EVERY DAY    amLODIPine (NORVASC) 10 mg tablet TAKE 1 TABLET EVERY DAY    metFORMIN (GLUCOPHAGE) 1,000 mg tablet Take 1 Tab by mouth two (2) times daily (with meals). Indications: TYPE 2 DIABETES MELLITUS    aspirin 81 mg tablet Take 81 mg by mouth daily. No current facility-administered medications for this visit.         [facility] follow up appointment(s):   Future Appointments   Date Time Provider Grace Frenchi   11/3/2020 10:15 AM Cleve Cardenas MD [facility] BS AMB   12/14/2020  1:15 PM [facility]        Non-[facility] follow up appointment(s): n/a    Goals Addressed                 This Visit's Progress     Attends follow up appointments on schedule   On track     9/23/20 Patient will attend all scheduled appointments through 12/23/20.  Knowledge and adherence of prescribed medication (ie. action, side effects, missed dose, etc.). On track     9/23/20 Pt will take all medications prescribed to be evaluated on each outreach through 12/23/20.  Patient/Family verbalizes understanding of self-management of chronic disease.    On track

## 2020-11-03 ENCOUNTER — VIRTUAL VISIT (OUTPATIENT)
Dept: FAMILY MEDICINE CLINIC | Age: 65
End: 2020-11-03
Payer: MEDICARE

## 2020-11-03 DIAGNOSIS — M79.673 PAIN OF FOOT, UNSPECIFIED LATERALITY: ICD-10-CM

## 2020-11-03 DIAGNOSIS — I10 ESSENTIAL HYPERTENSION: ICD-10-CM

## 2020-11-03 DIAGNOSIS — N40.1 BENIGN PROSTATIC HYPERPLASIA WITH LOWER URINARY TRACT SYMPTOMS, SYMPTOM DETAILS UNSPECIFIED: ICD-10-CM

## 2020-11-03 DIAGNOSIS — Z79.4 TYPE 2 DIABETES MELLITUS WITHOUT COMPLICATION, WITH LONG-TERM CURRENT USE OF INSULIN (HCC): ICD-10-CM

## 2020-11-03 DIAGNOSIS — E11.9 TYPE 2 DIABETES MELLITUS WITHOUT COMPLICATION, WITH LONG-TERM CURRENT USE OF INSULIN (HCC): ICD-10-CM

## 2020-11-03 DIAGNOSIS — J30.89 NON-SEASONAL ALLERGIC RHINITIS, UNSPECIFIED TRIGGER: Primary | ICD-10-CM

## 2020-11-03 PROCEDURE — 99442 PR PHYS/QHP TELEPHONE EVALUATION 11-20 MIN: CPT | Performed by: FAMILY MEDICINE

## 2020-11-03 RX ORDER — LORATADINE 10 MG/1
10 TABLET ORAL DAILY
Qty: 30 TAB | Refills: 5 | Status: SHIPPED | OUTPATIENT
Start: 2020-11-03 | End: 2021-06-28 | Stop reason: ALTCHOICE

## 2020-11-03 RX ORDER — FLUNISOLIDE 0.25 MG/ML
2 SOLUTION NASAL 2 TIMES DAILY
Qty: 1 ML | Refills: 12 | Status: SHIPPED | OUTPATIENT
Start: 2020-11-03 | End: 2021-12-01

## 2020-11-03 RX ORDER — TELMISARTAN 40 MG/1
TABLET ORAL
COMMUNITY
Start: 2020-10-11 | End: 2022-05-04 | Stop reason: CLARIF

## 2020-11-03 NOTE — PROGRESS NOTES
Georgina Iyer presents today for   Chief Complaint   Patient presents with    Hypertension     Follow up    Diabetes     Follow up        Georginajt Iyer preferred language for health care discussion is english/other. Is someone accompanying this pt? No    Is the patient using any DME equipment during OV? No    Depression Screening:  3 most recent PHQ Screens 8/11/2020   Little interest or pleasure in doing things Not at all   Feeling down, depressed, irritable, or hopeless Not at all   Total Score PHQ 2 0       Learning Assessment:  Learning Assessment 8/11/2020   PRIMARY LEARNER Patient   HIGHEST LEVEL OF EDUCATION - PRIMARY LEARNER  DID NOT GRADUATE HIGH SCHOOL   BARRIERS PRIMARY LEARNER VISUAL     -   CO-LEARNER CAREGIVER Yes   CO-LEARNER NAME Meryl   CO-LEARNER HIGHEST LEVEL OF EDUCATION 2 YEARS OF COLLEGE   BARRIERS CO-LEARNER NONE   PRIMARY LANGUAGE ENGLISH   PRIMARY LANGUAGE CO-LEARNER ENGLISH    NEED No   LEARNER PREFERENCE PRIMARY LISTENING     -   LEARNER PREFERENCE CO-LEARNER LISTENING   LEARNING SPECIAL TOPICS No   ANSWERED BY Patient   RELATIONSHIP SELF       Abuse Screening:  Abuse Screening Questionnaire 11/3/2020   Do you ever feel afraid of your partner? N   Are you in a relationship with someone who physically or mentally threatens you? N   Is it safe for you to go home? Y       Fall Risk  Fall Risk Assessment, last 12 mths 8/11/2020   Able to walk? Yes   Fall in past 12 months? No         Coordination of Care:  1. Have you been to the ER, urgent care clinic since your last visit? Hospitalized since your last visit? 9/2020 [facility] ED related to foot pain. 2. Have you seen or consulted any other health care providers outside of the [facility] since your last visit? Include any pap smears or colon screening. No    Advance Directive:  1. Do you have an advance directive in place? Patient Reply:No    2.  If not, would you like material regarding how to put one in place?  Patient Reply: No

## 2020-11-03 NOTE — PROGRESS NOTES
Megan Washington is a 72 y.o. male, evaluated via audio-only technology on 11/3/2020 for Hypertension (Follow up) and Diabetes (Follow up )  . Assessment & Plan:   Diagnoses and all orders for this visit:    1. Non-seasonal allergic rhinitis, unspecified trigger  -     loratadine (CLARITIN) 10 mg tablet; Take 1 Tab by mouth daily. -     flunisolide (NASAREL) 25 mcg (0.025 %) spry; 2 Sprays by Both Nostrils route two (2) times a day. Discontinue daily Afrin    2. Type 2 diabetes mellitus without complication, with long-term current use of insulin ([facility])  Stable. Continue present treatment plan as per endocrinology    3. Essential hypertension  Recommend routine home blood pressure monitoring. 4. Benign prostatic hyperplasia with lower urinary tract symptoms, symptom details unspecified  Much improved with addition of Flomax. Follow-up with urology as instructed previously. 5. Pain of foot, unspecified laterality  Pt to f/u with podiatry    The complexity of medical decision making for this visit is moderate   Follow-up and Dispositions    · Return in about 3 months (around 2/3/2021) for diabetes, high blood pressure. 12  Subjective:   Pt has been doing well. Pt saw Toyin Plascencia and was started on flomax. psa was wnl. He was dx with bph. His stream is \"a steady, strong stream\" now. He does feel he is emptying more completely. His nocturia is improved; he may get up 2-3 times now compared 5-6 previously. Blood sugars are usually around 120. Pt does not check bp at home. His endo did start telmisartan. Pt c/o nasal drainage and watery eyes. He has been using afrin daily but does not use any other allergy meds. Pt plans to get a flu shot at his pharmacy. He will also ask about pneumonia and shingles vaccinations. Pt had an Er visit for foot pain. He was told it may be bone spurs. Prior to Admission medications    Medication Sig Start Date End Date Taking?  Authorizing Provider   telmisartan (MICARDIS) 40 mg tablet take 1 tablet by mouth once daily 10/11/20  Yes Provider, Historical   insulin aspart, niacinamide, (FIASP FLEXTOUCH U-100 INSULIN SC) by SubCUTAneous route. Yes Provider, Historical   loratadine (CLARITIN) 10 mg tablet Take 1 Tab by mouth daily. 11/3/20  Yes Nelson Jacinto MD   flunisolide (NASAREL) 25 mcg (0.025 %) spry 2 Sprays by Both Nostrils route two (2) times a day. 11/3/20  Yes Nelson Jacinto MD   tamsulosin (FLOMAX) 0.4 mg capsule Take 1 Cap by mouth daily. 10/7/20  Yes Jose Strauss MD TOUCESAR SOLOSTAR 300 unit/mL (1.5 mL) inpn 60 Units by SubCUTAneous route nightly. 6/24/16  Yes Provider, Historical   hydrochlorothiazide (HYDRODIURIL) 25 mg tablet TAKE 1 TABLET EVERY DAY 5/13/15  Yes Elis Dillard MD   amLODIPine (NORVASC) 10 mg tablet TAKE 1 TABLET EVERY DAY 5/13/15  Yes Sole Dillard MD   metFORMIN (GLUCOPHAGE) 1,000 mg tablet Take 1 Tab by mouth two (2) times daily (with meals). Indications: TYPE 2 DIABETES MELLITUS 5/24/13  Yes Nelson Jacinto MD   aspirin 81 mg tablet Take 81 mg by mouth daily.    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    Legally blind H54.8    Type II or unspecified type diabetes mellitus without mention of complication, not stated as uncontrolled E11.9    Type 2 diabetes mellitus without complication ([facility] P70.3    Benign prostatic hyperplasia with lower urinary tract symptoms N40.1    Feeling of incomplete bladder emptying R39.14    Nocturia R35.1    Stranguria R30.0    Erectile dysfunction associated with type 2 diabetes mellitus (HCC) E11.69, N52.1    Family history of prostate cancer Z80.42    Non-seasonal allergic rhinitis J30.89     Current Outpatient Medications   Medication Sig Dispense Refill    telmisartan (MICARDIS) 40 mg tablet take 1 tablet by mouth once daily      insulin aspart, niacinamide, (FIASP FLEXTOUCH U-100 INSULIN SC) by SubCUTAneous route.  loratadine (CLARITIN) 10 mg tablet Take 1 Tab by mouth daily. 30 Tab 5    flunisolide (NASAREL) 25 mcg (0.025 %) spry 2 Sprays by Both Nostrils route two (2) times a day. 1 mL 12    tamsulosin (FLOMAX) 0.4 mg capsule Take 1 Cap by mouth daily. 30 Cap 11    TOUJEO SOLOSTAR 300 unit/mL (1.5 mL) inpn 60 Units by SubCUTAneous route nightly.  hydrochlorothiazide (HYDRODIURIL) 25 mg tablet TAKE 1 TABLET EVERY DAY 90 Tab 3    amLODIPine (NORVASC) 10 mg tablet TAKE 1 TABLET EVERY DAY 90 Tab 3    metFORMIN (GLUCOPHAGE) 1,000 mg tablet Take 1 Tab by mouth two (2) times daily (with meals). Indications: TYPE 2 DIABETES MELLITUS 180 Tab 3    aspirin 81 mg tablet Take 81 mg by mouth daily. No Known Allergies  Past Medical History:   Diagnosis Date    Diabetes ([address]     DJD (degenerative joint disease) of knee     History of impotence     Hypertension     Legally blind 2007    both eyes    Proteinuria     Retinitis pigmentosa of both eyes      Past Surgical History:   Procedure Laterality Date    HX WISDOM TEETH EXTRACTION      x4     Family History   Problem Relation Age of Onset    Heart Disease Mother     Heart Failure Mother     Kidney Disease Father     Diabetes Father     Cancer Father         prostate    Diabetes Sister     Hypertension Sister     Obesity Sister     Cancer Brother 52        prostate    Prostate Cancer Paternal Uncle      Social History     Tobacco Use    Smoking status: Never Smoker    Smokeless tobacco: Never Used   Substance Use Topics    Alcohol use: No       Review of Systems   Constitutional: Negative. HENT: Negative. Respiratory: Negative. Cardiovascular: Negative. All other systems reviewed and are negative. No flowsheet data found.      Juancho Sosa, who was evaluated through a patient-initiated, synchronous (real-time) audio only encounter, and/or her healthcare decision maker, is aware that it is a billable service, with coverage as determined by his insurance carrier. He provided verbal consent to proceed: n/a- consent obtained within past 12 months. He has not had a related appointment within my department in the past 7 days or scheduled within the next 24 hours.       Total Time: minutes: 11-20 minutes    Madan Garber MD

## 2020-11-06 ENCOUNTER — PATIENT OUTREACH (OUTPATIENT)
Dept: CASE MANAGEMENT | Age: 65
End: 2020-11-06

## 2020-11-06 NOTE — PROGRESS NOTES
Patient attended appointments with his PCP, Dr. Shelton Mcfadden on 11/3/20, Nurse Navigator reviewed EMR and will follow up on next scheduled outreach.

## 2020-11-09 ENCOUNTER — PATIENT OUTREACH (OUTPATIENT)
Dept: CASE MANAGEMENT | Age: 65
End: 2020-11-09

## 2020-11-09 NOTE — PROGRESS NOTES
Complex Case Management      Date/Time:  2020 9:36 AM    Method of communication with patient:phone    [facility] ([facility]) contacted the patient by telephone to perform Ambulatory Care Coordination. Verified name and  (PHI) with patient as identifiers. Provided introduction to self, and explanation of the Ambulatory Care Manager's role. Reviewed most recent clinic visit w/ patient who verbalized understanding. Patient given an opportunity to ask questions. Top Challenges reviewed with the patient   1. Spoke to patient, states doing well. No issues w/ new rx of Flomax. 2. Recent PCP visit, new meds, no questions about new meds. 3. No issues/questions at this time. The patient agrees to contact the PCP office or the [address] [place] [address] for questions related to their healthcare. Provided contact information for future reference. Disease Specific:   N/A    Home Health Active: No    DME Active: No    Barriers to care? lack of knowledge about disease    Advance Care Planning:   Does patient have an Advance Directive:  not on file; education provided     Medication(s):   Medication reconciliation was performed with patient who verbalizes understanding of administration of home medications. There were no barriers to obtaining medications identified at this time. Referral to Pharm D needed: no     Current Outpatient Medications   Medication Sig    telmisartan (MICARDIS) 40 mg tablet take 1 tablet by mouth once daily    insulin aspart, niacinamide, (FIASP FLEXTOUCH U-100 INSULIN SC) by SubCUTAneous route.  loratadine (CLARITIN) 10 mg tablet Take 1 Tab by mouth daily.  flunisolide (NASAREL) 25 mcg (0.025 %) spry 2 Sprays by Both Nostrils route two (2) times a day.  tamsulosin (FLOMAX) 0.4 mg capsule Take 1 Cap by mouth daily.  TOUJEO SOLOSTAR 300 unit/mL (1.5 mL) inpn 60 Units by SubCUTAneous route nightly.     hydrochlorothiazide (HYDRODIURIL) 25 mg tablet TAKE 1 TABLET EVERY DAY    amLODIPine (NORVASC) 10 mg tablet TAKE 1 TABLET EVERY DAY    metFORMIN (GLUCOPHAGE) 1,000 mg tablet Take 1 Tab by mouth two (2) times daily (with meals). Indications: TYPE 2 DIABETES MELLITUS    aspirin 81 mg tablet Take 81 mg by mouth daily. No current facility-administered medications for this visit. [facility] follow up appointment(s):   Future Appointments   Date Time Provider Grace Guallpa   12/14/2020  1:15 PM Jose Strauss MD [facility] OpenPM   12/31/2020 12:00 PM MD LILIBETH Lamas BS AMB   2/3/2021 12:00 PM MD LILIBETH Lamas BS AMB        Non-[facility] follow up appointment(s): n/a    Goals Addressed                 This Visit's Progress     Attends follow up appointments on schedule   On track     9/23/20 Patient will attend all scheduled appointments through 12/23/20.  Knowledge and adherence of prescribed medication (ie. action, side effects, missed dose, etc.). On track     9/23/20 Pt will take all medications prescribed to be evaluated on each outreach through 12/23/20.  Patient/Family verbalizes understanding of self-management of chronic disease.    On track

## 2020-11-17 LAB
HBA1C MFR BLD HPLC: 7.8 %
HBA1C MFR BLD HPLC: 7.8 %
LDL-C, EXTERNAL: 81

## 2020-11-18 LAB
CREATININE, EXTERNAL: 1.03
MICROALBUMIN UR TEST STR-MCNC: 532.1 MG/DL

## 2020-11-23 ENCOUNTER — PATIENT OUTREACH (OUTPATIENT)
Dept: CASE MANAGEMENT | Age: 65
End: 2020-11-23

## 2020-11-23 NOTE — PROGRESS NOTES
Complex Case Management      Date/Time:  2020 9:36 AM    Method of communication with patient:phone    [facility] ([facility]) contacted the patient by telephone to perform Ambulatory Care Coordination. Verified name and  (PHI) with patient as identifiers. Provided introduction to self, and explanation of the Ambulatory Care Manager's role. Reviewed most recent clinic visit w/ patient who verbalized understanding. Patient given an opportunity to ask questions. Top Challenges reviewed with the patient   1. Spoke to patient, states doing well. No issues w/ new rx of Flomax. 2. No issues/questions at this time. The patient agrees to contact the PCP office or the [address] [place] [address] for questions related to their healthcare. Provided contact information for future reference. Disease Specific:   N/A    Home Health Active: No    DME Active: No    Barriers to care? lack of knowledge about disease    Advance Care Planning:   Does patient have an Advance Directive:  not on file; education provided     Medication(s):   Medication reconciliation was performed with patient who verbalizes understanding of administration of home medications. There were no barriers to obtaining medications identified at this time. Referral to Pharm D needed: no     Current Outpatient Medications   Medication Sig    telmisartan (MICARDIS) 40 mg tablet take 1 tablet by mouth once daily    insulin aspart, niacinamide, (FIASP FLEXTOUCH U-100 INSULIN SC) by SubCUTAneous route.  loratadine (CLARITIN) 10 mg tablet Take 1 Tab by mouth daily.  flunisolide (NASAREL) 25 mcg (0.025 %) spry 2 Sprays by Both Nostrils route two (2) times a day.  tamsulosin (FLOMAX) 0.4 mg capsule Take 1 Cap by mouth daily.  TOUJEO SOLOSTAR 300 unit/mL (1.5 mL) inpn 60 Units by SubCUTAneous route nightly.     hydrochlorothiazide (HYDRODIURIL) 25 mg tablet TAKE 1 TABLET EVERY DAY    amLODIPine (NORVASC) 10 mg tablet TAKE 1 TABLET EVERY DAY    metFORMIN (GLUCOPHAGE) 1,000 mg tablet Take 1 Tab by mouth two (2) times daily (with meals). Indications: TYPE 2 DIABETES MELLITUS    aspirin 81 mg tablet Take 81 mg by mouth daily. No current facility-administered medications for this visit. [facility] follow up appointment(s):   Future Appointments   Date Time Provider Grace Guallpa   12/14/2020  1:15 PM Debra Strauss MD [facility] OpenPM   12/31/2020 12:00 PM [facility]   2/3/2021 12:00 PM [facility]        Non-[facility] follow up appointment(s): n/a    Goals Addressed                 This Visit's Progress     Attends follow up appointments on schedule   On track     9/23/20 Patient will attend all scheduled appointments through 12/23/20.  Knowledge and adherence of prescribed medication (ie. action, side effects, missed dose, etc.). On track     9/23/20 Pt will take all medications prescribed to be evaluated on each outreach through 12/23/20.  Patient/Family verbalizes understanding of self-management of chronic disease.    On track

## 2020-12-18 ENCOUNTER — PATIENT OUTREACH (OUTPATIENT)
Dept: CASE MANAGEMENT | Age: 65
End: 2020-12-18

## 2020-12-18 NOTE — PROGRESS NOTES
Patient has graduated from the Complex Case Management  program on 12/18/20. Patient's symptoms are stable at this time. Patient/family has the ability to self-manage. Care management goals have been completed at this time. No further nurse navigator follow up scheduled. Goals Addressed                 This Visit's Progress     COMPLETED: Attends follow up appointments on schedule        9/23/20 Patient will attend all scheduled appointments through 12/23/20.  COMPLETED: Knowledge and adherence of prescribed medication (ie. action, side effects, missed dose, etc.).        9/23/20 Pt will take all medications prescribed to be evaluated on each outreach through 12/23/20.  COMPLETED: Patient/Family verbalizes understanding of self-management of chronic disease. Pt has nurse navigator's contact information for any further questions, concerns, or needs.   Patients upcoming visits:    Future Appointments   Date Time Provider Grace Guallpa   12/30/2020  9:00 AM [facility]   2/3/2021 12:00 PM [facility]   2/5/2021 10:15 AM Jefe Strauss MD [facility]

## 2021-01-03 PROBLEM — E78.5 HYPERLIPIDEMIA ASSOCIATED WITH TYPE 2 DIABETES MELLITUS (HCC): Status: ACTIVE | Noted: 2020-10-07

## 2021-01-03 NOTE — PROGRESS NOTES
Krishna Fitzgerald is a 72 y.o. male who was seen via audio-only technology on 1/6/2021 for ER follow-up, diabetes, hypertension    Assessment & Plan:   Diagnoses and all orders for this visit:    1. Type 2 diabetes mellitus with hyperglycemia, with long-term current use of insulin ([facility])   A1c goal <7, will request records from Dr. Saturnino Jaimes office   Continue regimen for now   May assume care of diabetes if his A1c is controlled  -     REFERRAL TO PODIATRY    2. Hyperlipidemia associated with type 2 diabetes mellitus ([facility] [address]   Will need to restart statin, defer to PCP   Will request most recent labs from Dr. Saturnino Jaimes office    3. Essential hypertension   BP goal <130/80, continue regimen   Will determine which medication patient had an allergic reaction to once endocrine records reviewed    4. Nocturia   Improved on tamsulosin, continue per urology    5. Cough   Resolved, will need to determine which medication patient had reaction to   Records from Dr. Saturnino Jaimes office requested    6. Need for hepatitis C screening test   Patient requests to have this done at endocrine clinic    Follow-up and Dispositions    · Return in about 3 months (around 4/6/2021) for Diabetes, hyperlipidemia. SUBJECTIVE:     HPI    Patient was seen at [facility] on 12/06/2020 with c/o cough and mild SOB. Cough was described as dry but progressed to productive in nature. Associated symptoms include exertional dyspnea. Blood pressure upon arrival to the ED was 156/87, states he was started on new blood pressure medication by his endocrinologist 5 days prior to presentation and has noticed some LE pitting edema. Last saw Dr. Gerardo Skinner in late November and has an upcoming appointment on 02/26/2021. He was instructed to discontinue the medication d/t the side effect of shortness of breath.     Hypertension-  Compliant with meds  Symptoms:  None  BP readings at home are \"normal\"  Treatment:  Amlodipine 10 mg daily, HCTZ 25 mg daily, micardis 40 mg daily  Comorbid: type 2 DM, HLD, morbid obesity    Type 2 DM-  Compliant with meds. Home BG readings range from 119 to 290  Symptoms:  None  On statin:  No  Comorbid:  HLD, HTN  Treatment:  as below  Patient is requesting to switch back to his PCP for management of his diabetes  Key Antihyperglycemic Medications             insulin aspart, niacinamide, (FIASP FLEXTOUCH U-100 INSULIN SC) (Taking) by SubCUTAneous route. Sliding scale    TOUJEO SOLOSTAR 300 unit/mL (1.5 mL) inpn (Taking) 60 Units by SubCUTAneous route nightly. metFORMIN (GLUCOPHAGE) 1,000 mg tablet (Taking) Take 1 Tab by mouth two (2) times daily (with meals).  Indications: TYPE 2 DIABETES MELLITUS        Diabetic Foot and Eye Exam HM Status   Topic Date Due    Eye Exam  12/31/2008    Diabetic Foot Care  09/30/2014      Hyperlipidemia  Treatment:  None  Comorbid: Type 2 DM, HTN, morbid obesity  States he was taken off of cholesterol medication by his endocrinologist about 2 years ago  Lab Results   Component Value Date/Time    Cholesterol, total 155 01/17/2014 12:00 AM    HDL Cholesterol 45 01/17/2014 12:00 AM    LDL, calculated 73 01/17/2014 12:00 AM    VLDL, calculated 37 01/17/2014 12:00 AM    Triglyceride 183 (H) 01/17/2014 12:00 AM     Nocturia -  Onset:  A few mos ago  Was sent to urology by PCP  PSA was tested, reportedly \"normal\"  Started taking tamsulosin, which improved his symptoms    Health Maintenance:  Diabetic eye exam - N/A - legally blind  Foot exam - due now, referral generated  Hep C screening - patient to request to be added to Dr. Liliya Jc set of labs    Patient Active Problem List   Diagnosis Code    Essential hypertension I10    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    Legally blind H54.8    Type 2 diabetes mellitus with hyperglycemia, with long-term current use of insulin ([facility] [address] E11.65, Z79.4    Benign prostatic hyperplasia with lower urinary tract symptoms N40.1    Feeling of incomplete bladder emptying R39.14    Nocturia R35.1    Stranguria R30.0    Hyperlipidemia associated with type 2 diabetes mellitus (HCC) E11.69, E78.5    Family history of prostate cancer Z80.42    Non-seasonal allergic rhinitis J30.89     Current Outpatient Medications   Medication Sig Dispense Refill    telmisartan (MICARDIS) 40 mg tablet take 1 tablet by mouth once daily      insulin aspart, niacinamide, (FIASP FLEXTOUCH U-100 INSULIN SC) by SubCUTAneous route. Sliding scale      loratadine (CLARITIN) 10 mg tablet Take 1 Tab by mouth daily. 30 Tab 5    flunisolide (NASAREL) 25 mcg (0.025 %) spry 2 Sprays by Both Nostrils route two (2) times a day. 1 mL 12    tamsulosin (FLOMAX) 0.4 mg capsule Take 1 Cap by mouth daily. 30 Cap 11    TOUJEO SOLOSTAR 300 unit/mL (1.5 mL) inpn 60 Units by SubCUTAneous route nightly.  hydrochlorothiazide (HYDRODIURIL) 25 mg tablet TAKE 1 TABLET EVERY DAY 90 Tab 3    amLODIPine (NORVASC) 10 mg tablet TAKE 1 TABLET EVERY DAY 90 Tab 3    metFORMIN (GLUCOPHAGE) 1,000 mg tablet Take 1 Tab by mouth two (2) times daily (with meals). Indications: TYPE 2 DIABETES MELLITUS 180 Tab 3    aspirin 81 mg tablet Take 81 mg by mouth daily.        No Known Allergies   Past Medical History:   Diagnosis Date    Diabetes ([address]     DJD (degenerative joint disease) of knee     History of impotence     Hypertension     Legally blind 2007    both eyes    Proteinuria     Retinitis pigmentosa of both eyes       Social History     Socioeconomic History    Marital status:      Spouse name: Not on file    Number of children: Not on file    Years of education: Not on file    Highest education level: Not on file   Occupational History    Not on file   Social Needs    Financial resource strain: Not on file    Food insecurity     Worry: Not on file     Inability: Not on file    Transportation needs     Medical: Not on file     Non-medical: Not on file   Tobacco Use    Smoking status: Never Smoker    Smokeless tobacco: Never Used   Substance and Sexual Activity    Alcohol use: No    Drug use: Yes     Types: Prescription, OTC    Sexual activity: Not on file   Lifestyle    Physical activity     Days per week: Not on file     Minutes per session: Not on file    Stress: Not on file   Relationships    Social connections     Talks on phone: Not on file     Gets together: Not on file     Attends Yazdanism service: Not on file     Active member of club or organization: Not on file     Attends meetings of clubs or organizations: Not on file     Relationship status: Not on file    Intimate partner violence     Fear of current or ex partner: Not on file     Emotionally abused: Not on file     Physically abused: Not on file     Forced sexual activity: Not on file   Other Topics Concern    Not on file   Social History Narrative    Not on file      Past Surgical History:   Procedure Laterality Date    HX WISDOM TEETH EXTRACTION      x4      Family History   Problem Relation Age of Onset    Heart Disease Mother     Heart Failure Mother     Kidney Disease Father     Diabetes Father     Cancer Father         prostate    Diabetes Sister     Hypertension Sister     Obesity Sister     Cancer Brother 52        prostate    Prostate Cancer Paternal Uncle         Review of Systems   Constitutional: Negative for chills, fever and malaise/fatigue. Respiratory: Negative for cough and shortness of breath. Cardiovascular: Negative for chest pain, palpitations and leg swelling. Gastrointestinal: Negative for nausea and vomiting. Genitourinary: Negative for frequency. Neurological: Negative for dizziness, weakness and headaches. OBJECTIVE:     Physical Exam   Constitutional: Alert and oriented, in no distress  HENT:   Ears:  Hearing grossly intact.   Pulmonary/Chest: Does not sound dyspneic, no audible wheezes   Neurological:  Intact recent memory, answering questions appropriately. Psychiatric: Judgment and insight good, normal mood. We discussed the expected course, resolution and complications of the diagnosis(es) in detail. Medication risks, benefits, costs, interactions, and alternatives were discussed as indicated. I advised him to contact the office if his condition worsens, changes or fails to improve as anticipated. He expressed understanding with the diagnosis(es) and plan. Clinton Rubio, who was evaluated through a synchronous (real-time) audio only encounter, and/or his healthcare decision maker, is aware that it is a billable service, with coverage as determined by his insurance carrier. provided verbal consent to proceed: Yes, and patient identification was verified. It was conducted pursuant to the emergency declaration under the [address] [place] [address] authority and the [employer] General Act. A caregiver was present when appropriate. Ability to conduct physical exam was limited. I was in the office. The patient was at home.       Total time spent:  21-30 minutes    BEATRICE Restrepo

## 2021-01-06 ENCOUNTER — VIRTUAL VISIT (OUTPATIENT)
Dept: FAMILY MEDICINE CLINIC | Age: 66
End: 2021-01-06
Payer: MEDICARE

## 2021-01-06 DIAGNOSIS — E11.65 TYPE 2 DIABETES MELLITUS WITH HYPERGLYCEMIA, WITH LONG-TERM CURRENT USE OF INSULIN (HCC): Primary | ICD-10-CM

## 2021-01-06 DIAGNOSIS — E78.5 HYPERLIPIDEMIA ASSOCIATED WITH TYPE 2 DIABETES MELLITUS (HCC): ICD-10-CM

## 2021-01-06 DIAGNOSIS — Z11.59 NEED FOR HEPATITIS C SCREENING TEST: ICD-10-CM

## 2021-01-06 DIAGNOSIS — E11.69 HYPERLIPIDEMIA ASSOCIATED WITH TYPE 2 DIABETES MELLITUS (HCC): ICD-10-CM

## 2021-01-06 DIAGNOSIS — Z00.00 MEDICARE ANNUAL WELLNESS VISIT, SUBSEQUENT: ICD-10-CM

## 2021-01-06 DIAGNOSIS — I10 ESSENTIAL HYPERTENSION: ICD-10-CM

## 2021-01-06 DIAGNOSIS — Z79.4 TYPE 2 DIABETES MELLITUS WITH HYPERGLYCEMIA, WITH LONG-TERM CURRENT USE OF INSULIN (HCC): Primary | ICD-10-CM

## 2021-01-06 DIAGNOSIS — Z71.89 ACP (ADVANCE CARE PLANNING): ICD-10-CM

## 2021-01-06 DIAGNOSIS — R35.1 NOCTURIA: ICD-10-CM

## 2021-01-06 DIAGNOSIS — R05.9 COUGH: ICD-10-CM

## 2021-01-06 PROCEDURE — G0438 PPPS, INITIAL VISIT: HCPCS | Performed by: NURSE PRACTITIONER

## 2021-01-06 PROCEDURE — 99497 ADVNCD CARE PLAN 30 MIN: CPT | Performed by: NURSE PRACTITIONER

## 2021-01-06 PROCEDURE — 99443 PR PHYS/QHP TELEPHONE EVALUATION 21-30 MIN: CPT | Performed by: NURSE PRACTITIONER

## 2021-01-06 NOTE — PATIENT INSTRUCTIONS
Medicare Wellness Visit, Male The best way to live healthy is to have a lifestyle where you eat a well-balanced diet, exercise regularly, limit alcohol use, and quit all forms of tobacco/nicotine, if applicable. Regular preventive services are another way to keep healthy. Preventive services (vaccines, screening tests, monitoring & exams) can help personalize your care plan, which helps you manage your own care. Screening tests can find health problems at the earliest stages, when they are easiest to treat. [facility] follows the current, evidence-based guidelines published by the [facility] ([facility]) when recommending preventive services for our patients. Because we follow these guidelines, sometimes recommendations change over time as research supports it. (For example, a prostate screening blood test is no longer routinely recommended for men with no symptoms). Of course, you and your doctor may decide to screen more often for some diseases, based on your risk and co-morbidities (chronic disease you are already diagnosed with). Preventive services for you include: - Medicare offers their members a free annual wellness visit, which is time for you and your primary care provider to discuss and plan for your preventive service needs. Take advantage of this benefit every year! 
-All adults over age 72 should receive the recommended pneumonia vaccines. Current USPSTF guidelines recommend a series of two vaccines for the best pneumonia protection.  
-All adults should have a flu vaccine yearly and tetanus vaccine every 10 years. 
-All adults age 48 and older should receive the shingles vaccines (series of two vaccines). -All adults age 38-68 who are overweight should have a diabetes screening test once every three years. -Other screening tests & preventive services for persons with diabetes include: an eye exam to screen for diabetic retinopathy, a kidney function test, a foot exam, and stricter control over your cholesterol.  
-Cardiovascular screening for adults with routine risk involves an electrocardiogram (ECG) at intervals determined by the provider.  
-Colorectal cancer screening should be done for adults age 54-65 with no increased risk factors for colorectal cancer. There are a number of acceptable methods of screening for this type of cancer. Each test has its own benefits and drawbacks. Discuss with your provider what is most appropriate for you during your annual wellness visit. The different tests include: colonoscopy (considered the best screening method), a fecal occult blood test, a fecal DNA test, and sigmoidoscopy. 
-All adults born between [facility] should be screened once for Hepatitis C. 
-An Abdominal Aortic Aneurysm (AAA) Screening is recommended for men age 73-68 who has ever smoked in their lifetime. Here is a list of your current Health Maintenance items (your personalized list of preventive services) with a due date: 
Health Maintenance Due Topic Date Due  
 Hepatitis C Test  [address] [place] [address] Exam  12/31/2008 [facility] Diabetic Foot Care  09/30/2014  Glaucoma Screening   01/10/2020

## 2021-01-06 NOTE — PROGRESS NOTES
Advance Care Planning     Advance Care Planning (ACP) Physician/NP/PA Conversation      Date of Conversation: 1/6/2021  Conducted with: Patient with Decision Making Capacity    Healthcare Decision Maker:     Primary Decision Maker: Adriane Sandhoff - [phone]    Secondary Decision Maker: Edinson Diaz - Daughter  Click here to complete [address] including selection of the Healthcare Decision Maker Relationship (ie \"Primary\")  Today we documented Decision Maker(s). The patient will provide ACP documents. Care Preferences:    Hospitalization: \"If your health worsens and it becomes clear that your chance of recovery is unlikely, what would be your preference regarding hospitalization? \"  The patient would prefer hospitalization. Ventilation: \"If you were unable to breathe on your own and your chance of recovery was unlikely, what would be your preference about the use of a ventilator (breathing machine) if it was available to you? \"   The patient would desire the use of a ventilator. Resuscitation: \"In the event your heart stopped as a result of an underlying serious health condition, would you want attempts to be made to restart your heart, or would you prefer a natural death? \"   Yes, attempt to resuscitate.     Conversation Outcomes / Follow-Up Plan:   ACP in process - information provided, considering goals and options    Length of Voluntary ACP Conversation in minutes:  16 minutes    Scot Mckenzie NP

## 2021-01-06 NOTE — PROGRESS NOTES
This is an Initial Medicare Annual Wellness Exam (AWV) (Performed 12 months after IPPE or effective date of Medicare Part B enrollment, Once in a lifetime)    I have reviewed the patient's medical history in detail and updated the computerized patient record. Depression Risk Factor Screening:     3 most recent PHQ Screens 1/6/2021   Little interest or pleasure in doing things Not at all   Feeling down, depressed, irritable, or hopeless Not at all   Total Score PHQ 2 0       Alcohol Risk Screen    Do you average more than 1 drink per night or more than 7 drinks a week: No    In the past three months have you have had more than 4 drinks containing alcohol on one occasion: No    Functional Ability and Level of Safety:    Hearing: Hearing is good. Activities of Daily Living: The home contains: no safety equipment. Patient does total self care      Ambulation: with no difficulty      Fall Risk:  Fall Risk Assessment, last 12 mths 1/6/2021   Able to walk? Yes   Fall in past 12 months? 0   Do you feel unsteady? 0   Are you worried about falling 0      Abuse Screen:  Patient is not abused     Cognitive Screening    Has your family/caregiver stated any concerns about your memory: no     Assessment/Plan   Education and counseling provided:  Are appropriate based on today's review and evaluation  End-of-Life planning (with patient's consent)  Screening for glaucoma    Diagnoses and all orders for this visit:    1. Medicare annual wellness visit, subsequent    2. ACP (advance care planning)    3. Cough    4. Essential hypertension    5. Type 2 diabetes mellitus with hyperglycemia, with long-term current use of insulin (HCC)  -     METABOLIC PANEL, COMPREHENSIVE; Future  -     REFERRAL TO PODIATRY    6. Hyperlipidemia associated with type 2 diabetes mellitus (HCC)  -     METABOLIC PANEL, COMPREHENSIVE; Future  -     LIPID PANEL; Future    7. Need for hepatitis C screening test  -     HEPATITIS C AB;  Future Health Maintenance Due     Health Maintenance Due   Topic Date Due    Hepatitis C Screening  1955    Eye Exam Retinal or Dilated  2008    Foot Exam Q1  2014    GLAUCOMA SCREENING Q2Y  01/10/2020     Health Maintenance:  Hep C screening - ordered  Diabetic eye exam - N/A - legally blind  Foot exam - due now, referral generated    Patient Care Team   Patient Care Team:  Ruslan Torrez MD as PCP - General (Family Medicine)  Ruslan Torrez MD as PCP - [facility] Empaneled Provider    History     Patient Active Problem List   Diagnosis Code    Essential hypertension I10    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    Legally blind H54.8    Type 2 diabetes mellitus with hyperglycemia, with long-term current use of insulin ([address] E11.65, Z79.4    Benign prostatic hyperplasia with lower urinary tract symptoms N40.1    Feeling of incomplete bladder emptying R39.14    Nocturia R35.1    Stranguria R30.0    Hyperlipidemia associated with type 2 diabetes mellitus ([address] E11.69, E78.5    Family history of prostate cancer Z80.42    Non-seasonal allergic rhinitis J30.89     Past Medical History:   Diagnosis Date    Diabetes ([address]     DJD (degenerative joint disease) of knee     History of impotence     Hypertension     Legally blind     both eyes    Proteinuria     Retinitis pigmentosa of both eyes       Past Surgical History:   Procedure Laterality Date    HX WISDOM TEETH EXTRACTION      x4     Current Outpatient Medications   Medication Sig Dispense Refill    telmisartan (MICARDIS) 40 mg tablet take 1 tablet by mouth once daily      insulin aspart, niacinamide, (FIASP FLEXTOUCH U-100 INSULIN SC) by SubCUTAneous route. Sliding scale      loratadine (CLARITIN) 10 mg tablet Take 1 Tab by mouth daily. 30 Tab 5    flunisolide (NASAREL) 25 mcg (0.025 %) spry 2 Sprays by Both Nostrils route two (2) times a day. 1 mL 12    tamsulosin (FLOMAX) 0.4 mg capsule Take 1 Cap by mouth daily.  27 Cap 11    TOUJEO SOLOSTAR 300 unit/mL (1.5 mL) inpn 60 Units by SubCUTAneous route nightly.  hydrochlorothiazide (HYDRODIURIL) 25 mg tablet TAKE 1 TABLET EVERY DAY 90 Tab 3    amLODIPine (NORVASC) 10 mg tablet TAKE 1 TABLET EVERY DAY 90 Tab 3    metFORMIN (GLUCOPHAGE) 1,000 mg tablet Take 1 Tab by mouth two (2) times daily (with meals). Indications: TYPE 2 DIABETES MELLITUS 180 Tab 3    aspirin 81 mg tablet Take 81 mg by mouth daily. No Known Allergies    Family History   Problem Relation Age of Onset    Heart Disease Mother     Heart Failure Mother     Kidney Disease Father     Diabetes Father     Cancer Father         prostate    Diabetes Sister     Hypertension Sister     Obesity Sister     Cancer Brother 52        prostate    Prostate Cancer Paternal Uncle      Social History     Tobacco Use    Smoking status: Never Smoker    Smokeless tobacco: Never Used   Substance Use Topics    Alcohol use: No       Ara Perez, who was evaluated through a synchronous (real-time) audio only encounter, and/or his healthcare decision maker, is aware that it is a billable service, with coverage as determined by his insurance carrier. He provided verbal consent to proceed: Yes, and patient identification was verified. It was conducted pursuant to the emergency declaration under the [address] [place] [address], [address] [place] [address] authority and the [employer] General Act. A caregiver was present when appropriate. Ability to conduct physical exam was limited. I was in the office. The patient was at home.     Total time spent:  21-30 minutes    Rajesh Guzman NP

## 2021-01-06 NOTE — PROGRESS NOTES
Shikha July presents today for   Chief Complaint   Patient presents with   [facility] Annual Wellness Visit     Medicare       Is someone accompanying this pt? no    Is the patient using any DME equipment during OV? no    Depression Screening:  3 most recent PHQ Screens 1/6/2021   Little interest or pleasure in doing things Not at all   Feeling down, depressed, irritable, or hopeless Not at all   Total Score PHQ 2 0       Learning Assessment:  Learning Assessment 1/6/2021   PRIMARY LEARNER Patient   HIGHEST LEVEL OF EDUCATION - PRIMARY LEARNER  DID NOT GRADUATE [address] LEARNER NONE     -   CO-LEARNER CAREGIVER No   CO-LEARNER NAME -   Alexis Young 950 -    NEED No   LEARNER PREFERENCE PRIMARY DEMONSTRATION     -   LEARNER Mikayla 11 -   ANSWERED BY patient   RELATIONSHIP SELF       Abuse Screening:  Abuse Screening Questionnaire 1/6/2021   Do you ever feel afraid of your partner? N   Are you in a relationship with someone who physically or mentally threatens you? N   Is it safe for you to go home? Y       Fall Screening  Fall Risk Assessment, last 12 mths 1/6/2021   Able to walk? Yes   Fall in past 12 months? 0   Do you feel unsteady? 0   Are you worried about falling 0       Generalized Anxiety  No flowsheet data found. Health Maintenance Due   Topic Date Due    Hepatitis C Screening  1955    Eye Exam Retinal or Dilated  12/31/2008    Foot Exam Q1  09/30/2014    Medicare Yearly Exam  03/14/2018    GLAUCOMA SCREENING Q2Y  01/10/2020   . Health Maintenance reviewed and discussed and ordered per Provider. Coordination of Care  1. Have you been to the ER, urgent care clinic since your last visit? Hospitalized since your last visit? Yes, urgent care    2.  Have you seen or consulted any other health care providers outside of the [address] since your last visit? Include any pap smears or colon screening. no      Advance Directive:  1. Do you have an advance directive in place?  Patient Reply:no

## 2021-02-03 ENCOUNTER — VIRTUAL VISIT (OUTPATIENT)
Dept: FAMILY MEDICINE CLINIC | Age: 66
End: 2021-02-03
Payer: MEDICARE

## 2021-02-03 DIAGNOSIS — N40.1 BENIGN PROSTATIC HYPERPLASIA WITH LOWER URINARY TRACT SYMPTOMS, SYMPTOM DETAILS UNSPECIFIED: ICD-10-CM

## 2021-02-03 DIAGNOSIS — J30.89 NON-SEASONAL ALLERGIC RHINITIS, UNSPECIFIED TRIGGER: ICD-10-CM

## 2021-02-03 DIAGNOSIS — I10 ESSENTIAL HYPERTENSION: ICD-10-CM

## 2021-02-03 DIAGNOSIS — Z71.85 VACCINE COUNSELING: ICD-10-CM

## 2021-02-03 DIAGNOSIS — E78.5 HYPERLIPIDEMIA ASSOCIATED WITH TYPE 2 DIABETES MELLITUS (HCC): ICD-10-CM

## 2021-02-03 DIAGNOSIS — Z79.4 TYPE 2 DIABETES MELLITUS WITH HYPERGLYCEMIA, WITH LONG-TERM CURRENT USE OF INSULIN (HCC): Primary | ICD-10-CM

## 2021-02-03 DIAGNOSIS — E11.69 HYPERLIPIDEMIA ASSOCIATED WITH TYPE 2 DIABETES MELLITUS (HCC): ICD-10-CM

## 2021-02-03 DIAGNOSIS — E11.65 TYPE 2 DIABETES MELLITUS WITH HYPERGLYCEMIA, WITH LONG-TERM CURRENT USE OF INSULIN (HCC): Primary | ICD-10-CM

## 2021-02-03 DIAGNOSIS — E66.01 OBESITY, MORBID (HCC): ICD-10-CM

## 2021-02-03 PROCEDURE — 99443 PR PHYS/QHP TELEPHONE EVALUATION 21-30 MIN: CPT | Performed by: FAMILY MEDICINE

## 2021-02-03 RX ORDER — ACETAMINOPHEN 500 MG
2000 TABLET ORAL DAILY
COMMUNITY
Start: 2021-02-03

## 2021-02-03 RX ORDER — GLIMEPIRIDE 2 MG/1
TABLET ORAL
COMMUNITY
Start: 2020-11-10 | End: 2022-09-09 | Stop reason: ALTCHOICE

## 2021-02-03 RX ORDER — PRAVASTATIN SODIUM 20 MG/1
20 TABLET ORAL
Qty: 90 TAB | Refills: 4 | Status: SHIPPED | OUTPATIENT
Start: 2021-02-03 | End: 2022-01-24 | Stop reason: SDUPTHER

## 2021-02-03 NOTE — PROGRESS NOTES
Marty Mancini presents today for   Chief Complaint   Patient presents with    Diabetes    Hypertension       Virtual/telephone visit    Depression Screening:  3 most recent PHQ Screens 2/3/2021   Little interest or pleasure in doing things Not at all   Feeling down, depressed, irritable, or hopeless Not at all   Total Score PHQ 2 0       Learning Assessment:  Learning Assessment 2/3/2021   PRIMARY LEARNER Patient   HIGHEST LEVEL OF EDUCATION - PRIMARY LEARNER  DID NOT GRADUATE [address] LEARNER NONE     -   [address] CAREGIVER No   CO-LEARNER NAME -   [address] -   ANSWERED BY Patient   RELATIONSHIP SELF       Fall Risk  Fall Risk Assessment, last 12 mths 2/3/2021   Able to walk? Yes   Fall in past 12 months? 0   Do you feel unsteady? 0   Are you worried about falling 0       Travel Screening:   Travel Screening     Question   Response    In the last month, have you been in contact with someone who was confirmed or suspected to have Coronavirus / COVID-19? Yes    Have you had a COVID-19 viral test in the last 14 days? No (Wife had COVID-19 1/5/21, Pt never tested and never developed  symptoms per pt.)    Do you have any of the following new or worsening symptoms? None of these    Have you traveled internationally in the last month? No      Travel History   Travel since 01/03/21     No documented travel since 01/03/21          Health Maintenance reviewed and discussed and ordered per Provider. Health Maintenance Due   Topic Date Due    Hepatitis C Screening  1955    COVID-19 Vaccine (1 of 2) 01/10/1971    GLAUCOMA SCREENING Q2Y  01/10/2020    Shingrix Vaccine Age 50> (2 of 2) 01/08/2021   . Coordination of Care:  1.  Have you been to the ER, urgent care clinic since your last visit? Hospitalized since your last visit? No    2. Have you seen or consulted any other health care providers outside of the [address] [place] [address] since your last visit? Include any pap smears or colon screening.  No

## 2021-02-03 NOTE — PROGRESS NOTES
Phani Jalloh is a 77 y.o. male, evaluated via audio-only technology on 2/3/2021 for Diabetes and Hypertension  . Assessment & Plan:   Diagnoses and all orders for this visit:    1. Type 2 diabetes mellitus with hyperglycemia, with long-term current use of insulin (HCC)  -     pravastatin (PRAVACHOL) 20 mg tablet; Take 1 Tab by mouth nightly. Care as per endo    2. Obesity, morbid ([address]  Encouraged increased activity    3. Hyperlipidemia associated with type 2 diabetes mellitus (HCC)  -     pravastatin (PRAVACHOL) 20 mg tablet; Take 1 Tab by mouth nightly. 4. Essential hypertension  Check daily x 1 wk. Call with readings. May need additional med. 5. Benign prostatic hyperplasia with lower urinary tract symptoms, symptom details unspecified  Stable. Discussed changing flomax to qam.      6. Non-seasonal allergic rhinitis, unspecified trigger  Stable, cont pres tx plan. 7. Vaccine counseling  Recommend covid vaccination when available. Discussed possible locations for covid vaccination. The complexity of medical decision making for this visit is moderate   Follow-up and Dispositions    · Return in about 3 months (around 5/3/2021) for diabetes, high blood pressure, high cholesterol, bph.         12  Subjective:   Pt is feeling well. Pt has added an mvi and vit d to his regimen. Pt has not checked his bp lately. Pt will see endo later this month. He was last seen there in Nov 2020. His bp was elevated so minoxidil was added. Pt did not tolerate this med. He was seen at the ER. The med was d/c'ed. His sx did resolve. He was advised to d/c foods like hot dogs and bologna. Pt reports he is still doing well with flomax. Pt doesn't get much exercise due to the cold weather. Pt is using the nose spray that was prescribed at his last appt. He is not using afrin. Pt does plan to get the covid vaccine. He needs to know where to get it.         Prior to Admission medications    Medication Sig Start Date End Date Taking? Authorizing Provider   glimepiride (AMARYL) 2 mg tablet  11/10/20  Yes Provider, Historical   multivit-min/folic/vit K/lycop (ONE-A-DAY MEN'S 50 PLUS PO) Take  by mouth. Yes Provider, Historical   cholecalciferol (VITAMIN D3) (2,000 UNITS /50 MCG) cap capsule Take 1 Cap by mouth daily. 2/3/21  Yes Rosalva Sevilla MD   pravastatin (PRAVACHOL) 20 mg tablet Take 1 Tab by mouth nightly. 2/3/21  Yes Rosalva Sevilla MD   telmisartan (MICARDIS) 40 mg tablet take 1 tablet by mouth once daily 10/11/20  Yes Provider, Historical   insulin aspart, niacinamide, (FIASP FLEXTOUCH U-100 INSULIN SC) by SubCUTAneous route. Sliding scale   Yes Provider, Historical   loratadine (CLARITIN) 10 mg tablet Take 1 Tab by mouth daily. 11/3/20  Yes Rosalva Sevilla MD   flunisolide (NASAREL) 25 mcg (0.025 %) spry 2 Sprays by Both Nostrils route two (2) times a day. 11/3/20  Yes Rosalva Sevilla MD   tamsulosin (FLOMAX) 0.4 mg capsule Take 1 Cap by mouth daily. 10/7/20  Yes Nathaniel Strauss MD TOUJEO SOLOSTAR 300 unit/mL (1.5 mL) inpn 60 Units by SubCUTAneous route nightly. 6/24/16  Yes Provider, Historical   hydrochlorothiazide (HYDRODIURIL) 25 mg tablet TAKE 1 TABLET EVERY DAY 5/13/15  Yes Elis Dillard MD   amLODIPine (NORVASC) 10 mg tablet TAKE 1 TABLET EVERY DAY 5/13/15  Yes Betty Dillard MD   metFORMIN (GLUCOPHAGE) 1,000 mg tablet Take 1 Tab by mouth two (2) times daily (with meals). Indications: TYPE 2 DIABETES MELLITUS 5/24/13  Yes Rosalva Sevilla MD   aspirin 81 mg tablet Take 81 mg by mouth daily.    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    Legally blind H54.8    Type 2 diabetes mellitus with hyperglycemia, with long-term current use of insulin ([address] E11.65, Z79.4    Benign prostatic hyperplasia with lower urinary tract symptoms N40.1    Feeling of incomplete bladder emptying R39.14    Nocturia R35.1    Stranguria R30.0    Hyperlipidemia associated with type 2 diabetes mellitus (HCC) E11.69, E78.5    Family history of prostate cancer Z80.42    Non-seasonal allergic rhinitis J30.89     Current Outpatient Medications   Medication Sig Dispense Refill    glimepiride (AMARYL) 2 mg tablet       multivit-min/folic/vit K/lycop (ONE-A-DAY MEN'S 50 PLUS PO) Take  by mouth.  cholecalciferol (VITAMIN D3) (2,000 UNITS /50 MCG) cap capsule Take 1 Cap by mouth daily.  pravastatin (PRAVACHOL) 20 mg tablet Take 1 Tab by mouth nightly. 90 Tab 4    telmisartan (MICARDIS) 40 mg tablet take 1 tablet by mouth once daily      insulin aspart, niacinamide, (FIASP FLEXTOUCH U-100 INSULIN SC) by SubCUTAneous route. Sliding scale      loratadine (CLARITIN) 10 mg tablet Take 1 Tab by mouth daily. 30 Tab 5    flunisolide (NASAREL) 25 mcg (0.025 %) spry 2 Sprays by Both Nostrils route two (2) times a day. 1 mL 12    tamsulosin (FLOMAX) 0.4 mg capsule Take 1 Cap by mouth daily. 30 Cap 11    TOUJEO SOLOSTAR 300 unit/mL (1.5 mL) inpn 60 Units by SubCUTAneous route nightly.  hydrochlorothiazide (HYDRODIURIL) 25 mg tablet TAKE 1 TABLET EVERY DAY 90 Tab 3    amLODIPine (NORVASC) 10 mg tablet TAKE 1 TABLET EVERY DAY 90 Tab 3    metFORMIN (GLUCOPHAGE) 1,000 mg tablet Take 1 Tab by mouth two (2) times daily (with meals). Indications: TYPE 2 DIABETES MELLITUS 180 Tab 3    aspirin 81 mg tablet Take 81 mg by mouth daily.        No Known Allergies  Past Medical History:   Diagnosis Date    Diabetes ([address]     DJD (degenerative joint disease) of knee     History of impotence     Hypertension     Legally blind 2007    both eyes    Proteinuria     Retinitis pigmentosa of both eyes      Past Surgical History:   Procedure Laterality Date    HX WISDOM TEETH EXTRACTION      x4     Family History   Problem Relation Age of Onset    Heart Disease Mother     Heart Failure Mother     Kidney Disease Father     Diabetes Father     Cancer Father         prostate    Diabetes Sister     Hypertension Sister     Obesity Sister     Cancer Brother 52        prostate    Prostate Cancer Paternal Uncle      Social History     Tobacco Use    Smoking status: Never Smoker    Smokeless tobacco: Never Used   Substance Use Topics    Alcohol use: No       Review of Systems   Constitutional: Negative. HENT: Negative. Respiratory: Negative. Cardiovascular: Negative. All other systems reviewed and are negative. No flowsheet data found. Joe Jackson, who was evaluated through a patient-initiated, synchronous (real-time) audio only encounter, and/or her healthcare decision maker, is aware that it is a billable service, with coverage as determined by his insurance carrier. He provided verbal consent to proceed: n/a- consent obtained within past 12 months. He has not had a related appointment within my department in the past 7 days or scheduled within the next 24 hours.       Total Time: minutes: 21-30 minutes    Gaylan Gilford, MD

## 2021-02-26 LAB — HBA1C MFR BLD HPLC: 8.3 %

## 2021-05-24 ENCOUNTER — TELEPHONE (OUTPATIENT)
Dept: FAMILY MEDICINE CLINIC | Age: 66
End: 2021-05-24

## 2021-05-24 NOTE — TELEPHONE ENCOUNTER
Pt stated he was sent to Podiatrist and would like to discuss the visit with a nurse. If possible can he get a call back.  Thanks

## 2021-05-25 NOTE — TELEPHONE ENCOUNTER
Pt states that he wants his PCP to send in some meds for gout per his recent appointment with podiatry. Pt was informed to schedule an appointment to address this issue with his PCP.

## 2021-06-24 LAB — HBA1C MFR BLD HPLC: 7.9 %

## 2021-06-28 ENCOUNTER — OFFICE VISIT (OUTPATIENT)
Dept: FAMILY MEDICINE CLINIC | Age: 66
End: 2021-06-28
Payer: MEDICARE

## 2021-06-28 VITALS
HEIGHT: 68 IN | BODY MASS INDEX: 38.95 KG/M2 | WEIGHT: 257 LBS | OXYGEN SATURATION: 99 % | TEMPERATURE: 98.8 F | HEART RATE: 98 BPM | RESPIRATION RATE: 18 BRPM | DIASTOLIC BLOOD PRESSURE: 72 MMHG | SYSTOLIC BLOOD PRESSURE: 148 MMHG

## 2021-06-28 DIAGNOSIS — Z79.4 TYPE 2 DIABETES MELLITUS WITH HYPERGLYCEMIA, WITH LONG-TERM CURRENT USE OF INSULIN (HCC): ICD-10-CM

## 2021-06-28 DIAGNOSIS — R60.0 LEG EDEMA, RIGHT: ICD-10-CM

## 2021-06-28 DIAGNOSIS — J30.89 NON-SEASONAL ALLERGIC RHINITIS, UNSPECIFIED TRIGGER: Primary | ICD-10-CM

## 2021-06-28 DIAGNOSIS — E11.65 TYPE 2 DIABETES MELLITUS WITH HYPERGLYCEMIA, WITH LONG-TERM CURRENT USE OF INSULIN (HCC): ICD-10-CM

## 2021-06-28 PROCEDURE — 3017F COLORECTAL CA SCREEN DOC REV: CPT | Performed by: FAMILY MEDICINE

## 2021-06-28 PROCEDURE — G8536 NO DOC ELDER MAL SCRN: HCPCS | Performed by: FAMILY MEDICINE

## 2021-06-28 PROCEDURE — G8754 DIAS BP LESS 90: HCPCS | Performed by: FAMILY MEDICINE

## 2021-06-28 PROCEDURE — G8427 DOCREV CUR MEDS BY ELIG CLIN: HCPCS | Performed by: FAMILY MEDICINE

## 2021-06-28 PROCEDURE — 99215 OFFICE O/P EST HI 40 MIN: CPT | Performed by: FAMILY MEDICINE

## 2021-06-28 PROCEDURE — 1101F PT FALLS ASSESS-DOCD LE1/YR: CPT | Performed by: FAMILY MEDICINE

## 2021-06-28 PROCEDURE — G8417 CALC BMI ABV UP PARAM F/U: HCPCS | Performed by: FAMILY MEDICINE

## 2021-06-28 PROCEDURE — 2022F DILAT RTA XM EVC RTNOPTHY: CPT | Performed by: FAMILY MEDICINE

## 2021-06-28 PROCEDURE — 3052F HG A1C>EQUAL 8.0%<EQUAL 9.0%: CPT | Performed by: FAMILY MEDICINE

## 2021-06-28 PROCEDURE — G8432 DEP SCR NOT DOC, RNG: HCPCS | Performed by: FAMILY MEDICINE

## 2021-06-28 PROCEDURE — G8753 SYS BP > OR = 140: HCPCS | Performed by: FAMILY MEDICINE

## 2021-06-28 RX ORDER — INDOMETHACIN 50 MG/1
CAPSULE ORAL
COMMUNITY
Start: 2021-06-24 | End: 2022-09-20

## 2021-06-28 RX ORDER — LEVOCETIRIZINE DIHYDROCHLORIDE 5 MG/1
5 TABLET, FILM COATED ORAL DAILY
Qty: 30 TABLET | Refills: 5 | Status: SHIPPED | OUTPATIENT
Start: 2021-06-28 | End: 2022-01-07

## 2021-06-28 RX ORDER — DAPAGLIFLOZIN 10 MG/1
TABLET, FILM COATED ORAL
COMMUNITY
Start: 2021-06-25

## 2021-06-28 RX ORDER — MONTELUKAST SODIUM 10 MG/1
10 TABLET ORAL DAILY
Qty: 30 TABLET | Refills: 5 | Status: SHIPPED | OUTPATIENT
Start: 2021-06-28 | End: 2022-01-07

## 2021-06-28 NOTE — PROGRESS NOTES
Chief Complaint   Patient presents with    Breathing Problem     c/o SOB that is worse at night    Cough     c/o of  productive cough for several months, pt has not seen the color of sputum, pt states that he has coughing spells mainly at night after laying down.  Leg Swelling     pitting edema noted to bilateral LE, more noted to RLE. Mario Alberto Oliva is a 77 y.o. male. HPI   Pt reports several month hx of cough that is worse at night. Pt is accompanied today by his dtr who is an LPN. She reports dyspnea at night. Pt denies that he has to sit up at night to catch his breath. He sleeps on one pillow. Pt reports that the cough happens more at night and this is what causes the trouble breathing. Pt was recently seen by alec and told to start farixga. Pt was told that it can help with heart problems. Pt's family was concerned that he was implying that pt has heart dz. Pt thinks his A1c that day was in the 7's. Pt also has leg swelling. Pt's dtr reports that it is pitting edema. She did not advise him to elevate the legs. Review of Systems   Constitutional: Negative. HENT: Positive for congestion. Respiratory: Positive for cough, sputum production and shortness of breath. Cardiovascular: Positive for leg swelling. All other systems reviewed and are negative. Objective  Physical Exam  Vitals and nursing note reviewed. Constitutional:       General: He is not in acute distress. Appearance: Normal appearance. HENT:      Head: Normocephalic and atraumatic. Right Ear: External ear normal. There is impacted cerumen. Left Ear: External ear normal. A middle ear effusion is present. Tympanic membrane is not erythematous. Nose: Nose normal.      Right Turbinates: Swollen and pale. Left Turbinates: Swollen and pale. Mouth/Throat:      Pharynx: No pharyngeal swelling, oropharyngeal exudate or posterior oropharyngeal erythema.    Eyes: Extraocular Movements: Extraocular movements intact. Conjunctiva/sclera: Conjunctivae normal.   Neck:      Thyroid: No thyromegaly. Cardiovascular:      Rate and Rhythm: Normal rate and regular rhythm. Heart sounds: No murmur heard. No friction rub. No gallop. Pulmonary:      Effort: Pulmonary effort is normal.      Breath sounds: Normal breath sounds. No wheezing, rhonchi or rales. Musculoskeletal:         General: Normal range of motion. Cervical back: Normal range of motion. No rigidity. Right lower leg: No tenderness. 1+ Pitting Edema present. Left lower leg: Normal.      Right ankle: Swelling present. No ecchymosis. No tenderness. Left ankle: Normal.      Right foot: Swelling present. No bony tenderness. Lymphadenopathy:      Cervical: No cervical adenopathy. Skin:     General: Skin is warm and dry. Neurological:      Mental Status: He is alert and oriented to person, place, and time. Coordination: Coordination normal.   Psychiatric:         Mood and Affect: Mood normal.         Behavior: Behavior normal.         Thought Content: Thought content normal.         Judgment: Judgment normal.          Assessment & Plan  Diagnoses and all orders for this visit:    1. Non-seasonal allergic rhinitis, unspecified trigger  -     Add: levocetirizine (XYZAL) 5 mg tablet; Take 1 Tablet by mouth daily.  -    Add:  montelukast (Singulair) 10 mg tablet; Take 1 Tablet by mouth daily. D/c claritin. Cont nasal spray. 2. Leg edema, right  -     REFERRAL TO VASCULAR SURGERY  Recommend use of compression hose and elevation. No signs of dvt. 3. Type 2 diabetes mellitus with hyperglycemia, with long-term current use of insulin ([address]  Discussed use of sglt-2 in pts with dm to decrease heart complications. Cont care as per endo      Follow-up and Dispositions    · Return for routine care 8/6/21; also allergic rhinitis, medication change(s), leg swelling.        Elis COLEY Carl Cody MD

## 2021-06-28 NOTE — PROGRESS NOTES
Ariane Rod presents today for   Chief Complaint   Patient presents with    Breathing Problem     c/o SOB that is worse at night    Cough     c/o of  productive cough for several months, pt has not seen the color of sputum, pt states that he has coughing spells mainly at night after laying down.  Leg Swelling     pitting edema noted to bilateral LE, more noted to RLE. Ariane Rod preferred language for health care discussion is english/other. Is someone accompanying this pt? Kyrie Rodriguez. Is the patient using any DME equipment during OV? No    Depression Screening:  3 most recent PHQ Screens 5/6/2021   Little interest or pleasure in doing things Not at all   Feeling down, depressed, irritable, or hopeless Not at all   Total Score PHQ 2 0       Learning Assessment:  Learning Assessment 2/3/2021   PRIMARY LEARNER Patient   HIGHEST LEVEL OF EDUCATION - PRIMARY LEARNER  DID NOT GRADUATE [address] LEARNER NONE     -   [address] CAREGIVER No   CO-LEARNER NAME -   [address] -   ANSWERED BY Patient   RELATIONSHIP SELF       Fall Risk  Fall Risk Assessment, last 12 mths 2/3/2021   Able to walk? Yes   Fall in past 12 months? 0   Do you feel unsteady? 0   Are you worried about falling 0       Travel Screening:   Travel Screening     Question   Response    In the last month, have you been in contact with someone who was confirmed or suspected to have Coronavirus / COVID-19? No / Unsure    Have you had a COVID-19 viral test in the last 14 days? No    Do you have any of the following new or worsening symptoms? Cough    Have you traveled internationally or domestically in the last month?   No      Travel History   Travel since 05/28/21     No documented travel since 05/28/21          Health Maintenance reviewed and discussed and ordered per Provider. Health Maintenance Due   Topic Date Due    Hepatitis C Screening  Never done    COVID-19 Vaccine (1) Never done    Shingrix Vaccine Age 50> (2 of 2) 01/08/2021   . Coordination of Care:  1. Have you been to the ER, urgent care clinic since your last visit? Hospitalized since your last visit? No    2. Have you seen or consulted any other health care providers outside of the [address] [place] [address] since your last visit? Include any pap smears or colon screening. Pt has seen Shira Pena last Friday.

## 2021-08-06 ENCOUNTER — OFFICE VISIT (OUTPATIENT)
Dept: FAMILY MEDICINE CLINIC | Age: 66
End: 2021-08-06
Payer: MEDICARE

## 2021-08-06 VITALS
BODY MASS INDEX: 38.95 KG/M2 | HEART RATE: 94 BPM | OXYGEN SATURATION: 99 % | HEIGHT: 68 IN | WEIGHT: 257 LBS | TEMPERATURE: 98 F | SYSTOLIC BLOOD PRESSURE: 151 MMHG | RESPIRATION RATE: 17 BRPM | DIASTOLIC BLOOD PRESSURE: 80 MMHG

## 2021-08-06 DIAGNOSIS — Z79.4 TYPE 2 DIABETES MELLITUS WITH HYPERGLYCEMIA, WITH LONG-TERM CURRENT USE OF INSULIN (HCC): ICD-10-CM

## 2021-08-06 DIAGNOSIS — E11.65 TYPE 2 DIABETES MELLITUS WITH HYPERGLYCEMIA, WITH LONG-TERM CURRENT USE OF INSULIN (HCC): ICD-10-CM

## 2021-08-06 DIAGNOSIS — J30.89 NON-SEASONAL ALLERGIC RHINITIS, UNSPECIFIED TRIGGER: ICD-10-CM

## 2021-08-06 DIAGNOSIS — I10 ESSENTIAL HYPERTENSION: Primary | ICD-10-CM

## 2021-08-06 DIAGNOSIS — E78.5 HYPERLIPIDEMIA ASSOCIATED WITH TYPE 2 DIABETES MELLITUS (HCC): ICD-10-CM

## 2021-08-06 DIAGNOSIS — E11.69 HYPERLIPIDEMIA ASSOCIATED WITH TYPE 2 DIABETES MELLITUS (HCC): ICD-10-CM

## 2021-08-06 DIAGNOSIS — R60.0 LEG EDEMA, RIGHT: ICD-10-CM

## 2021-08-06 DIAGNOSIS — K21.9 GASTROESOPHAGEAL REFLUX DISEASE, UNSPECIFIED WHETHER ESOPHAGITIS PRESENT: ICD-10-CM

## 2021-08-06 PROCEDURE — 99215 OFFICE O/P EST HI 40 MIN: CPT | Performed by: FAMILY MEDICINE

## 2021-08-06 PROCEDURE — G8427 DOCREV CUR MEDS BY ELIG CLIN: HCPCS | Performed by: FAMILY MEDICINE

## 2021-08-06 PROCEDURE — G8510 SCR DEP NEG, NO PLAN REQD: HCPCS | Performed by: FAMILY MEDICINE

## 2021-08-06 PROCEDURE — 3017F COLORECTAL CA SCREEN DOC REV: CPT | Performed by: FAMILY MEDICINE

## 2021-08-06 PROCEDURE — 1101F PT FALLS ASSESS-DOCD LE1/YR: CPT | Performed by: FAMILY MEDICINE

## 2021-08-06 PROCEDURE — 3052F HG A1C>EQUAL 8.0%<EQUAL 9.0%: CPT | Performed by: FAMILY MEDICINE

## 2021-08-06 PROCEDURE — G8536 NO DOC ELDER MAL SCRN: HCPCS | Performed by: FAMILY MEDICINE

## 2021-08-06 PROCEDURE — G8417 CALC BMI ABV UP PARAM F/U: HCPCS | Performed by: FAMILY MEDICINE

## 2021-08-06 PROCEDURE — G8754 DIAS BP LESS 90: HCPCS | Performed by: FAMILY MEDICINE

## 2021-08-06 PROCEDURE — G8753 SYS BP > OR = 140: HCPCS | Performed by: FAMILY MEDICINE

## 2021-08-06 PROCEDURE — 2022F DILAT RTA XM EVC RTNOPTHY: CPT | Performed by: FAMILY MEDICINE

## 2021-08-06 RX ORDER — OMEPRAZOLE 40 MG/1
40 CAPSULE, DELAYED RELEASE ORAL DAILY
Qty: 30 CAPSULE | Refills: 5 | Status: SHIPPED | OUTPATIENT
Start: 2021-08-06 | End: 2022-01-12 | Stop reason: SDUPTHER

## 2021-08-06 NOTE — PROGRESS NOTES
Chief Complaint   Patient presents with    Medication Evaluation    Leg Swelling    Allergic Rhinitis         HPI    Valentino Pontiff is a 77 y.o. male presenting today for    3 months  follow up of htn, hld, bph. Pt was also seen approx 6 wks ago for cough and dyspnea. He was started on meds for ar to help with congestion that is worse at night. Pt was referred to vasc surg for leg swelling. He was to be seen there today but that appt has been postponed until next week. He is using a compression sock on the R foot. The leg is smaller. Pt's wife states that his congestion is worse at night. It is not improved with the meds we have tried. Pt does reports that he has some reflux at night. He takes tums as needed. Patient does not need medication refills today. Review of Systems   Constitutional: Negative. HENT: Negative. Respiratory: Negative. Cardiovascular: Negative. All other systems reviewed and are negative. Physical Exam  Vitals and nursing note reviewed. Constitutional:       General: He is not in acute distress. Appearance: Normal appearance. HENT:      Head: Normocephalic and atraumatic. Right Ear: External ear normal. There is impacted cerumen. Left Ear: Tympanic membrane, ear canal and external ear normal.      Nose: Nose normal.      Right Turbinates: Enlarged. Left Turbinates: Enlarged. Mouth/Throat:      Mouth: Mucous membranes are moist.      Pharynx: No pharyngeal swelling, oropharyngeal exudate or posterior oropharyngeal erythema. Eyes:      Extraocular Movements: Extraocular movements intact. Conjunctiva/sclera: Conjunctivae normal.   Cardiovascular:      Rate and Rhythm: Normal rate and regular rhythm. Heart sounds: No murmur heard. No friction rub. No gallop. Pulmonary:      Effort: Pulmonary effort is normal.      Breath sounds: Normal breath sounds. No wheezing, rhonchi or rales.    Musculoskeletal: General: Normal range of motion. Cervical back: Normal range of motion. No rigidity. Skin:     General: Skin is warm and dry. Neurological:      Mental Status: He is alert and oriented to person, place, and time. Coordination: Coordination normal.   Psychiatric:         Mood and Affect: Mood normal.         Behavior: Behavior normal.         Thought Content: Thought content normal.         Judgment: Judgment normal.         Diagnoses and all orders for this visit:    1. Essential hypertension  Normotensive per home readings. Cont pres tx plan for now. 2. Hyperlipidemia associated with type 2 diabetes mellitus ([facility]  Care as per endo    3. Type 2 diabetes mellitus with hyperglycemia, with long-term current use of insulin ([facility]  Care as per endo    4. Non-seasonal allergic rhinitis, unspecified trigger  Cont pres meds as rx'ed. 5. Leg edema, right  vasc eval pending  Cont use of compression sock. Cont elevation when seated. 6. Gastroesophageal reflux disease, unspecified whether esophagitis present  -     Trial: omeprazole (PRILOSEC) 40 mg capsule; Take 1 Capsule by mouth daily.       Follow-up and Dispositions    · Return in about 6 weeks (around 9/17/2021) for medication change(s), gerd, allergic rhinitis, f/u vasc eval.

## 2021-08-06 NOTE — PROGRESS NOTES
Colton Aldrich presents today for   Chief Complaint   Patient presents with    Medication Evaluation    Leg Swelling    Allergic Rhinitis       Is someone accompanying this pt? Yes, Ms. Thu Michelle     Is the patient using any DME equipment during OV? No     Depression Screening:  3 most recent PHQ Screens 8/6/2021   Little interest or pleasure in doing things Not at all   Feeling down, depressed, irritable, or hopeless Not at all   Total Score PHQ 2 0       Learning Assessment:  Learning Assessment 2/3/2021   PRIMARY LEARNER Patient   HIGHEST LEVEL OF EDUCATION - PRIMARY LEARNER  DID NOT GRADUATE [address] LEARNER NONE     -   [address]  CAREGIVER No   CO-LEARNER NAME -   [address] -   ANSWERED BY Patient   RELATIONSHIP SELF       Fall Risk  Fall Risk Assessment, last 12 mths 8/6/2021   Able to walk? Yes   Fall in past 12 months? 0   Do you feel unsteady? 0   Are you worried about falling 0       ADL  No flowsheet data found. Travel Screening:    Travel Screening     Question   Response    In the last month, have you been in contact with someone who was confirmed or suspected to have Coronavirus / COVID-19? No / Unsure    Have you had a COVID-19 viral test in the last 14 days? No    Do you have any of the following new or worsening symptoms? Have you traveled internationally or domestically in the last month? No      Travel History   Travel since 07/06/21     No documented travel since 07/06/21          Health Maintenance reviewed and discussed and ordered per Provider. Health Maintenance Due   Topic Date Due    Hepatitis C Screening  Never done    COVID-19 Vaccine (1) Never done    Shingrix Vaccine Age 50> (2 of 2) 01/08/2021   . Coordination of Care:  1. Have you been to the ER, urgent care clinic since your last visit? Hospitalized since your last visit? No     2. Have you seen or consulted any other health care providers outside of the [address] [place] [address] since your last visit? Include any pap smears or colon screening.  No

## 2021-08-11 ENCOUNTER — OFFICE VISIT (OUTPATIENT)
Dept: VASCULAR SURGERY | Age: 66
End: 2021-08-11
Payer: MEDICARE

## 2021-08-11 VITALS
WEIGHT: 257 LBS | BODY MASS INDEX: 38.95 KG/M2 | HEART RATE: 88 BPM | OXYGEN SATURATION: 94 % | HEIGHT: 68 IN | SYSTOLIC BLOOD PRESSURE: 150 MMHG | DIASTOLIC BLOOD PRESSURE: 70 MMHG

## 2021-08-11 DIAGNOSIS — R60.0 LOCALIZED EDEMA: Primary | ICD-10-CM

## 2021-08-11 PROCEDURE — 99203 OFFICE O/P NEW LOW 30 MIN: CPT | Performed by: SURGERY

## 2021-08-11 NOTE — PROGRESS NOTES
[facility]    Chief Complaint   Patient presents with    Swelling     Elevation helps some.  Leg Pain     Right side x 3 months. Wearing compression stocking daily. History and Physical    77 y.o. male with diabetes, hypertension hyperlipidemia. He presents for evaluation of right lower extremity edema. Edema started about 3 months ago and is localized to the foot, extending up the leg. He has a history of anasarca in December. This was thought to be due to a blood pressure medication. Edema improves with elevation. He has been wearing compression stockings. This has relieved the pain but not the edema. Leg remains swollen overnight. He admits to pain in the foot before he started wearing compression stockings. No edema on the left side. He endorses shortness of breath when lying down, sleeps on 1 pillow. Past Medical History:   Diagnosis Date    Diabetes ([address]     DJD (degenerative joint disease) of knee     History of impotence     Hypertension     Legally blind 2007    both eyes    Proteinuria     Retinitis pigmentosa of both eyes      Patient Active Problem List   Diagnosis Code    Essential hypertension I10    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    Legally blind H54.8    Type 2 diabetes mellitus with hyperglycemia, with long-term current use of insulin ([address] E11.65, Z79.4    Benign prostatic hyperplasia with lower urinary tract symptoms N40.1    Feeling of incomplete bladder emptying R39.14    Nocturia R35.1    Stranguria R30.0    Hyperlipidemia associated with type 2 diabetes mellitus ([address] E11.69, E78.5    Family history of prostate cancer Z80.42    Non-seasonal allergic rhinitis J30.89     Past Surgical History:   Procedure Laterality Date    HX WISDOM TEETH EXTRACTION      x4     Current Outpatient Medications   Medication Sig Dispense Refill    omeprazole (PRILOSEC) 40 mg capsule Take 1 Capsule by mouth daily.  30 Capsule 5    indomethacin (INDOCIN) 50 mg capsule take 1 capsule by mouth twice a day      levocetirizine (XYZAL) 5 mg tablet Take 1 Tablet by mouth daily. 30 Tablet 5    montelukast (Singulair) 10 mg tablet Take 1 Tablet by mouth daily. 30 Tablet 5    Droplet Insulin Syr,half unit, 0.5 mL 31 gauge x 5/16\" syrg       ibuprofen (MOTRIN) 800 mg tablet Take 1 Tab by mouth every eight (8) hours as needed for Pain. 90 Tab 3    tamsulosin (FLOMAX) 0.4 mg capsule Take 2 Caps by mouth daily. 180 Cap 3    glimepiride (AMARYL) 2 mg tablet       multivit-min/folic/vit K/lycop (ONE-A-DAY MEN'S 50 PLUS PO) Take  by mouth.  cholecalciferol (VITAMIN D3) (2,000 UNITS /50 MCG) cap capsule Take 1 Cap by mouth daily.  pravastatin (PRAVACHOL) 20 mg tablet Take 1 Tab by mouth nightly. 90 Tab 4    telmisartan (MICARDIS) 40 mg tablet take 1 tablet by mouth once daily      insulin aspart, niacinamide, (FIASP FLEXTOUCH U-100 INSULIN SC) by SubCUTAneous route. Sliding scale      flunisolide (NASAREL) 25 mcg (0.025 %) spry 2 Sprays by Both Nostrils route two (2) times a day. 1 mL 12    TOUJEO SOLOSTAR 300 unit/mL (1.5 mL) inpn 60 Units by SubCUTAneous route nightly.  hydrochlorothiazide (HYDRODIURIL) 25 mg tablet TAKE 1 TABLET EVERY DAY 90 Tab 3    amLODIPine (NORVASC) 10 mg tablet TAKE 1 TABLET EVERY DAY 90 Tab 3    metFORMIN (GLUCOPHAGE) 1,000 mg tablet Take 1 Tab by mouth two (2) times daily (with meals). Indications: TYPE 2 DIABETES MELLITUS 180 Tab 3    Farxiga 10 mg tab tablet       aspirin 81 mg tablet Take 81 mg by mouth daily.  (Patient not taking: Reported on 8/6/2021)       No Known Allergies  Social History     Socioeconomic History    Marital status:      Spouse name: Not on file    Number of children: Not on file    Years of education: Not on file    Highest education level: Not on file   Occupational History    Not on file   Tobacco Use    Smoking status: Never Smoker    Smokeless tobacco: Never Used   Vaping Use    Vaping Use: Never used   Substance and Sexual Activity    Alcohol use: No    Drug use: Yes     Types: Prescription, OTC    Sexual activity: Not on file   Other Topics Concern    Not on file   Social History Narrative    Not on file     Social Determinants of Health     Financial Resource Strain:     Difficulty of Paying Living Expenses:    Food Insecurity:     Worried About Running Out of Food in the Last Year:     [address] in the Last Year:    Transportation Needs:     Lack of Transportation (Medical):      Lack of Transportation (Non-Medical):    Physical Activity:     Days of Exercise per Week:     Minutes of Exercise per Session:    Stress:     Feeling of Stress :    Social Connections:     Frequency of Communication with Friends and Family:     Frequency of Social Gatherings with Friends and Family:     Attends [employer] Services:     Active Member of Clubs or Organizations:     Attends Club or Organization Meetings:     Marital Status:    Intimate Partner Violence:     Fear of Current or Ex-Partner:     Emotionally Abused:     Physically Abused:     Sexually Abused:       Family History   Problem Relation Age of Onset    Heart Disease Mother     Heart Failure Mother     Kidney Disease Father     Diabetes Father     Cancer Father         prostate    Diabetes Sister     Hypertension Sister     Obesity Sister     Cancer Brother 52        prostate    Prostate Cancer Paternal Uncle        Review of Systems    General: negative for fever   Eyes: negative for vision loss   HENT: negative for cold symptoms   Respiratory negative for shortness of breath   Cardiac: negative for chest pain   Vascular negative for foot pain at night    Gastrointestinal: negative for abdominal pain   Genitourinary: negative for dysuria    Endocrine: negative for excessive thirst   Skin: negative for rash   Neurological: negative for paralysis   Psychiatric: negative for depression Physical Exam:    Visit Vitals  BP (!) 152/98 (BP 1 Location: Right upper arm, BP Patient Position: Sitting)   Pulse 88   Ht 5' 8\" (1.727 m)   Wt 257 lb (116.6 kg)   SpO2 94%   BMI 39.08 kg/m²        Constitutional:  Patient is well developed, well nourished, and not distressed. HEENT: atraumatic, normocephalic, wearing a mask. Eyes:   Cunjunctivae clear, no scleral icterus  Cardiovascular:  Normal rate, regular rhythm  Pulses:       Right:      Radial         2+    Dorsalis      2+   Left:        Radial         2+    Dorsalis      2+           Pulmonary/Chest: Effort normal    Extremities: Normal range of motion. Mild edema right foot and ankle . Digits no cyanosis or clubbing  Neurological:  he  is alert and oriented x3 . Gait normal.   Psych: Appropriate mood and affect. Skin:  Skin is warm and dry. No rash noted. No erythema. No ulcers. No prominent varicose veins or hyperpigmentation      Impression and Plan:  77 y.o. male with right lower extremity edema. Discussed multifactorial nature of edema. We will obtain a right lower extremity venous insufficiency study to rule out reflux. In the interim, recommend continued use of compression stockings and elevation. He will return in 4 weeks in follow-up. The treatment plan was reviewed with the patient in detail. The patient voiced understanding of this plan and all questions and concerns were addressed. The patient agrees with this plan. We discussed the signs and symptoms that would require earlier attention or intervention. I appreciate the opportunity to participate in the care of your patient. I will be sure to keep you informed of any subsequent changes in the treatment plan. If you have any questions or concerns, please feel free to contact me.         Michaela Price MD PhD  Vascular Surgery    Covering for [employer] [facility]          PLEASE NOTE:  This document has been produced using voice recognition software. Unrecognized errors in transcription may be present.

## 2021-08-11 NOTE — PROGRESS NOTES
1. Have you been to an emergency room or urgent care clinic since your last visit? No    Hospitalized since your last visit? If yes, where, when, and reason for visit? No    2. Have you seen or consulted any other health care providers outside of the [facility] since your last visit including any procedures, health maintenance items. If yes, where, when and reason for visit?  No

## 2021-08-24 ENCOUNTER — TELEPHONE (OUTPATIENT)
Dept: FAMILY MEDICINE CLINIC | Age: 66
End: 2021-08-24

## 2021-08-24 NOTE — TELEPHONE ENCOUNTER
Pt wife called because he's showing signs of confusion, and he had blood in his stool when he used the bathroom the other day. She thinks its a UTI but she is not sure. Please advise.  Thank you!!!

## 2021-08-24 NOTE — TELEPHONE ENCOUNTER
Returned call to pt. Wife of pt has been informed to take her  to the ED. Understanding was verbalized.

## 2021-09-11 DIAGNOSIS — R60.0 LOCALIZED EDEMA: ICD-10-CM

## 2021-09-11 LAB
RIGHT CFV RFX: 3.8 S
RIGHT GSV AK RFX: 2.9 S
RIGHT GSV AT KNEE DIAM: 0.29 CM
RIGHT GSV JUNC DIAM: 0.88 CM
RIGHT GSV JUNC RFX: 2.5 S
RIGHT GSV THIGH DIST DIAM: 0.44 CM
RIGHT GSV THIGH MID DIAM: 0.36 CM
RIGHT GSV THIGH PROX DIAM: 0.51 CM

## 2021-09-14 ENCOUNTER — OFFICE VISIT (OUTPATIENT)
Dept: VASCULAR SURGERY | Age: 66
End: 2021-09-14
Payer: MEDICARE

## 2021-09-14 VITALS
BODY MASS INDEX: 38.95 KG/M2 | HEART RATE: 85 BPM | OXYGEN SATURATION: 97 % | SYSTOLIC BLOOD PRESSURE: 132 MMHG | WEIGHT: 257 LBS | HEIGHT: 68 IN | DIASTOLIC BLOOD PRESSURE: 82 MMHG

## 2021-09-14 DIAGNOSIS — I87.2 VENOUS INSUFFICIENCY OF RIGHT LOWER EXTREMITY: Primary | ICD-10-CM

## 2021-09-14 PROCEDURE — 99213 OFFICE O/P EST LOW 20 MIN: CPT | Performed by: PHYSICIAN ASSISTANT

## 2021-09-14 NOTE — PROGRESS NOTES
[facility]    Chief Complaint   Patient presents with    Swelling       History and Physical    Mr. Srikanth Brown is a 22-year-old male who presents today for follow-up after undergoing venous duplex study. Since his visit last month he states that he has been wearing the compression stockings. He states that he has noticed some improvement in his symptoms. I reviewed the results of his venous duplex study with him and his wife. He does have venous insufficiency with significant reflux. The results of the study were explained in detail to both the patient and his wife. During the visit the patient's wife did mention that Mr. Srikanth Brown experiences some coughing and shortness of breath when laying flat. There is no report of chest pain or palpitations. Currently he has no complaints. Past Medical History:   Diagnosis Date    Diabetes ([address]     DJD (degenerative joint disease) of knee     History of impotence     Hypertension     Legally blind 2007    both eyes    Proteinuria     Retinitis pigmentosa of both eyes      Patient Active Problem List   Diagnosis Code    Essential hypertension I10    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    Legally blind H54.8    Type 2 diabetes mellitus with hyperglycemia, with long-term current use of insulin ([address] E11.65, Z79.4    Benign prostatic hyperplasia with lower urinary tract symptoms N40.1    Feeling of incomplete bladder emptying R39.14    Nocturia R35.1    Stranguria R30.0    Hyperlipidemia associated with type 2 diabetes mellitus ([address] E11.69, E78.5    Family history of prostate cancer Z80.42    Non-seasonal allergic rhinitis J30.89     Past Surgical History:   Procedure Laterality Date    HX WISDOM TEETH EXTRACTION      x4     Current Outpatient Medications   Medication Sig Dispense Refill    omeprazole (PRILOSEC) 40 mg capsule Take 1 Capsule by mouth daily.  30 Capsule 5    Farxiga 10 mg tab tablet       indomethacin (INDOCIN) 50 mg capsule take 1 capsule by mouth twice a day      levocetirizine (XYZAL) 5 mg tablet Take 1 Tablet by mouth daily. 30 Tablet 5    montelukast (Singulair) 10 mg tablet Take 1 Tablet by mouth daily. 30 Tablet 5    Droplet Insulin Syr,half unit, 0.5 mL 31 gauge x 5/16\" syrg       ibuprofen (MOTRIN) 800 mg tablet Take 1 Tab by mouth every eight (8) hours as needed for Pain. 90 Tab 3    tamsulosin (FLOMAX) 0.4 mg capsule Take 2 Caps by mouth daily. 180 Cap 3    glimepiride (AMARYL) 2 mg tablet       multivit-min/folic/vit K/lycop (ONE-A-DAY MEN'S 50 PLUS PO) Take  by mouth.  cholecalciferol (VITAMIN D3) (2,000 UNITS /50 MCG) cap capsule Take 1 Cap by mouth daily.  pravastatin (PRAVACHOL) 20 mg tablet Take 1 Tab by mouth nightly. 90 Tab 4    telmisartan (MICARDIS) 40 mg tablet take 1 tablet by mouth once daily      insulin aspart, niacinamide, (FIASP FLEXTOUCH U-100 INSULIN SC) by SubCUTAneous route. Sliding scale      flunisolide (NASAREL) 25 mcg (0.025 %) spry 2 Sprays by Both Nostrils route two (2) times a day. 1 mL 12    TOUJEO SOLOSTAR 300 unit/mL (1.5 mL) inpn 60 Units by SubCUTAneous route nightly.  hydrochlorothiazide (HYDRODIURIL) 25 mg tablet TAKE 1 TABLET EVERY DAY 90 Tab 3    amLODIPine (NORVASC) 10 mg tablet TAKE 1 TABLET EVERY DAY 90 Tab 3    metFORMIN (GLUCOPHAGE) 1,000 mg tablet Take 1 Tab by mouth two (2) times daily (with meals). Indications: TYPE 2 DIABETES MELLITUS 180 Tab 3    aspirin 81 mg tablet Take 81 mg by mouth daily. (Patient not taking: Reported on 8/6/2021)       No Known Allergies    Review of Systems    A full review of systems was completed times ten organ systems and was deemed negative unless otherwise mentioned in the HPI. Physical   Visit Vitals  /82 (BP 1 Location: Right upper arm, BP Patient Position: Sitting)   Pulse 85   Ht 5' 8\" (1.727 m)   Wt 257 lb (116.6 kg)   SpO2 97%   BMI 39.08 kg/m²       Physical Exam  Vitals reviewed.    Constitutional: General: He is not in acute distress. Appearance: He is obese. He is not ill-appearing. Cardiovascular:      Rate and Rhythm: Normal rate and regular rhythm. Heart sounds: Normal heart sounds. Pulmonary:      Effort: Pulmonary effort is normal. No respiratory distress. Musculoskeletal:         General: Normal range of motion. Right lower le+ Edema present. Left lower le+ Edema present. Skin:     General: Skin is warm and dry. Findings: No lesion. Neurological:      General: No focal deficit present. Mental Status: He is alert and oriented to person, place, and time. Impression/Plan:     ICD-10-CM ICD-9-CM    1. Venous insufficiency of right lower extremity  I87.2 459.81      No orders of the defined types were placed in this encounter. Mr. Bob Lamas is a 43-year-old male with right lower extremity venous insufficiency and reflux. He is to continue wearing his compression stockings and elevating his legs when able. We will have him follow-up in 1 month with Dr. Verónica Norris for a discussion regarding further treatment options. Mr. Bob Lamas has a follow-up appointment with his primary care next month and I have encouraged him to discuss with them his coughing and shortness of breath when he lays down. Donan Messer PAToroC      PLEASE NOTE:  This document has been produced using voice recognition software. Unrecognized errors in transcription may be present.

## 2021-09-14 NOTE — PROGRESS NOTES
1. Have you been to an emergency room or urgent care clinic since your last visit? No    Hospitalized since your last visit? If yes, where, when, and reason for visit? No    2. Have you seen or consulted any other health care providers outside of the [facility] since your last visit including any procedures, health maintenance items. If yes, where, when and reason for visit?  No

## 2021-10-08 ENCOUNTER — OFFICE VISIT (OUTPATIENT)
Dept: FAMILY MEDICINE CLINIC | Age: 66
End: 2021-10-08
Payer: MEDICARE

## 2021-10-08 VITALS
BODY MASS INDEX: 38.95 KG/M2 | TEMPERATURE: 97.3 F | SYSTOLIC BLOOD PRESSURE: 132 MMHG | HEIGHT: 68 IN | HEART RATE: 89 BPM | RESPIRATION RATE: 22 BRPM | DIASTOLIC BLOOD PRESSURE: 78 MMHG | WEIGHT: 257 LBS | OXYGEN SATURATION: 99 %

## 2021-10-08 DIAGNOSIS — I10 ESSENTIAL HYPERTENSION: Primary | ICD-10-CM

## 2021-10-08 DIAGNOSIS — R05.9 COUGH: ICD-10-CM

## 2021-10-08 DIAGNOSIS — M10.9 GOUT, UNSPECIFIED CAUSE, UNSPECIFIED CHRONICITY, UNSPECIFIED SITE: ICD-10-CM

## 2021-10-08 DIAGNOSIS — K21.9 GASTROESOPHAGEAL REFLUX DISEASE, UNSPECIFIED WHETHER ESOPHAGITIS PRESENT: ICD-10-CM

## 2021-10-08 PROCEDURE — 3017F COLORECTAL CA SCREEN DOC REV: CPT | Performed by: FAMILY MEDICINE

## 2021-10-08 PROCEDURE — G8754 DIAS BP LESS 90: HCPCS | Performed by: FAMILY MEDICINE

## 2021-10-08 PROCEDURE — G8432 DEP SCR NOT DOC, RNG: HCPCS | Performed by: FAMILY MEDICINE

## 2021-10-08 PROCEDURE — G8752 SYS BP LESS 140: HCPCS | Performed by: FAMILY MEDICINE

## 2021-10-08 PROCEDURE — 1101F PT FALLS ASSESS-DOCD LE1/YR: CPT | Performed by: FAMILY MEDICINE

## 2021-10-08 PROCEDURE — G8417 CALC BMI ABV UP PARAM F/U: HCPCS | Performed by: FAMILY MEDICINE

## 2021-10-08 PROCEDURE — G8536 NO DOC ELDER MAL SCRN: HCPCS | Performed by: FAMILY MEDICINE

## 2021-10-08 PROCEDURE — 99214 OFFICE O/P EST MOD 30 MIN: CPT | Performed by: FAMILY MEDICINE

## 2021-10-08 PROCEDURE — G8427 DOCREV CUR MEDS BY ELIG CLIN: HCPCS | Performed by: FAMILY MEDICINE

## 2021-10-08 RX ORDER — PEN NEEDLE, DIABETIC 31 GX5/16"
NEEDLE, DISPOSABLE MISCELLANEOUS
COMMUNITY
Start: 2021-08-04

## 2021-10-08 RX ORDER — PEN NEEDLE, DIABETIC 30 GX5/16"
NEEDLE, DISPOSABLE MISCELLANEOUS
COMMUNITY
Start: 2021-08-06

## 2021-10-08 NOTE — PROGRESS NOTES
Chief Complaint   Patient presents with    Cough      Follow up    Medication Question     Pt wants medicaton for gout         HPI    Vilma Hernandez is a 77 y.o. male presenting today for 6 weeks  follow up of gerd, AR, vasc eval.    Pt was dx with vasc insufficiency by vasc surg. Pt is taking the omeprazole qhs. He no longer has heartburn. He does still have cough but does not get out of breath with it at night like he used to. The cough does occur during the day as well as at night. Pt has never been a smoker. Pt has not had an eval with pulm previously. The cough has been going on for 3-4 months now. He has had neg covid testing. Pt had a uric acid level done with endo. It was elevated but that doctor deferred tx to his pcp. Patient does not need medication refills today. Review of Systems   Constitutional: Negative. HENT: Negative. Respiratory: Negative. Cardiovascular: Negative. All other systems reviewed and are negative. Physical Exam  Vitals and nursing note reviewed. Constitutional:       General: He is not in acute distress. Appearance: Normal appearance. HENT:      Head: Normocephalic and atraumatic. Right Ear: External ear normal.      Left Ear: External ear normal.      Nose: Nose normal.   Eyes:      Extraocular Movements: Extraocular movements intact. Conjunctiva/sclera: Conjunctivae normal.   Cardiovascular:      Rate and Rhythm: Normal rate and regular rhythm. Heart sounds: No murmur heard. No friction rub. No gallop. Pulmonary:      Effort: Pulmonary effort is normal.      Breath sounds: Normal breath sounds. No wheezing, rhonchi or rales. Musculoskeletal:         General: Normal range of motion. Cervical back: Normal range of motion. No rigidity. Skin:     General: Skin is warm and dry. Neurological:      Mental Status: He is alert and oriented to person, place, and time.       Coordination: Coordination normal.   Psychiatric:         Mood and Affect: Mood normal.         Behavior: Behavior normal.         Thought Content: Thought content normal.         Judgment: Judgment normal.         Diagnoses and all orders for this visit:    1. Essential hypertension  -     METABOLIC PANEL, COMPREHENSIVE; Future  Stable, cont pres tx plan. 2. Gastroesophageal reflux disease, unspecified whether esophagitis present  Stable, cont pres tx plan. 3. Cough  -     REFERRAL TO PULMONARY DISEASE    4. Gout, unspecified cause, unspecified chronicity, unspecified site  -     METABOLIC PANEL, COMPREHENSIVE; Future  -     URIC ACID;  Future      Follow-up and Dispositions    · Return in about 2 months (around 12/8/2021) for high blood pressure, gerd, gout, specialist eval.

## 2021-10-11 ENCOUNTER — TELEPHONE (OUTPATIENT)
Dept: FAMILY MEDICINE CLINIC | Age: 66
End: 2021-10-11

## 2021-10-11 DIAGNOSIS — M10.9 GOUT, UNSPECIFIED CAUSE, UNSPECIFIED CHRONICITY, UNSPECIFIED SITE: Primary | ICD-10-CM

## 2021-10-11 LAB
A-G RATIO,AGRAT: 1.1 RATIO (ref 1.1–2.6)
ALBUMIN SERPL-MCNC: 4.2 G/DL (ref 3.5–5)
ALP SERPL-CCNC: 106 U/L (ref 40–125)
ALT SERPL-CCNC: 25 U/L (ref 5–40)
ANION GAP SERPL CALC-SCNC: 11 MMOL/L (ref 3–15)
AST SERPL W P-5'-P-CCNC: 22 U/L (ref 10–37)
BILIRUB SERPL-MCNC: 0.3 MG/DL (ref 0.2–1.2)
BUN SERPL-MCNC: 23 MG/DL (ref 6–22)
CALCIUM SERPL-MCNC: 10.4 MG/DL (ref 8.4–10.5)
CHLORIDE SERPL-SCNC: 105 MMOL/L (ref 98–110)
CO2 SERPL-SCNC: 26 MMOL/L (ref 20–32)
CREAT SERPL-MCNC: 1.1 MG/DL (ref 0.8–1.6)
GFRAA, 66117: >60
GFRNA, 66118: >60
GLOBULIN,GLOB: 3.9 G/DL (ref 2–4)
GLUCOSE SERPL-MCNC: 219 MG/DL (ref 70–99)
POTASSIUM SERPL-SCNC: 5.2 MMOL/L (ref 3.5–5.5)
PROT SERPL-MCNC: 8.1 G/DL (ref 6.2–8.1)
SODIUM SERPL-SCNC: 142 MMOL/L (ref 133–145)
URATE SERPL-MCNC: 10 MG/DL (ref 3.9–9)

## 2021-10-15 RX ORDER — COLCHICINE 0.6 MG/1
0.6 TABLET ORAL DAILY
Qty: 30 TABLET | Refills: 2 | Status: SHIPPED | OUTPATIENT
Start: 2021-10-15 | End: 2021-10-21 | Stop reason: CLARIF

## 2021-10-15 RX ORDER — ALLOPURINOL 100 MG/1
100 TABLET ORAL DAILY
Qty: 30 TABLET | Refills: 12 | Status: SHIPPED | OUTPATIENT
Start: 2021-10-15 | End: 2022-01-12 | Stop reason: SDUPTHER

## 2021-10-15 NOTE — TELEPHONE ENCOUNTER
We will start allopurinol to lower his uric acid levels. However, doing this can precipitate a flare of gout, so we will also start colchicine once daily for three months. We can adjust the dose of allopurinol if needed. We will know if we need to do this by rechecking his blood work in 3 weeks to see how much his level has gone down. I am ordering the labs and sending the e-rx to his pharmacy.

## 2021-10-21 ENCOUNTER — OFFICE VISIT (OUTPATIENT)
Dept: VASCULAR SURGERY | Age: 66
End: 2021-10-21
Payer: MEDICARE

## 2021-10-21 ENCOUNTER — TELEPHONE (OUTPATIENT)
Dept: FAMILY MEDICINE CLINIC | Age: 66
End: 2021-10-21

## 2021-10-21 VITALS
OXYGEN SATURATION: 97 % | HEART RATE: 94 BPM | RESPIRATION RATE: 20 BRPM | SYSTOLIC BLOOD PRESSURE: 130 MMHG | DIASTOLIC BLOOD PRESSURE: 70 MMHG

## 2021-10-21 DIAGNOSIS — I87.2 VENOUS (PERIPHERAL) INSUFFICIENCY: Primary | ICD-10-CM

## 2021-10-21 DIAGNOSIS — M10.9 GOUT, UNSPECIFIED CAUSE, UNSPECIFIED CHRONICITY, UNSPECIFIED SITE: Primary | ICD-10-CM

## 2021-10-21 PROCEDURE — 99213 OFFICE O/P EST LOW 20 MIN: CPT | Performed by: SURGERY

## 2021-10-21 RX ORDER — COLCHICINE 0.6 MG/1
0.6 CAPSULE ORAL DAILY
Qty: 30 CAPSULE | Refills: 5 | Status: SHIPPED | OUTPATIENT
Start: 2021-10-21 | End: 2021-11-15

## 2021-10-21 NOTE — TELEPHONE ENCOUNTER
Per CoverMyMeds, colchicine is not on the [place] formulary. It must be written for brand name [employer].

## 2021-10-21 NOTE — PROGRESS NOTES
1. Have you been to an emergency room or urgent care clinic since your last visit? No     Hospitalized since your last visit? If yes, where, when, and reason for visit? No     2. Have you seen or consulted any other health care providers outside of the [facility] since your last visit including any procedures, health maintenance items. If yes, where, when and reason for visit?  No                3 most recent PHQ Screens 10/21/2021   Little interest or pleasure in doing things Not at all   Feeling down, depressed, irritable, or hopeless Not at all   Total Score PHQ 2 0

## 2021-10-21 NOTE — PATIENT INSTRUCTIONS
Learning About Endovenous Ablation for Varicose Veins  What is endovenous ablation for varicose veins? Endovenous ablation is a procedure to close off varicose veins. Endovenous means that the procedure is done inside the vein. Ablation means a doctor uses heat to damage and close off the vein. Varicose veins are twisted, enlarged veins near the surface of the skin. The heat damages a vein, and scar tissue forms. This scar tissue closes the vein. A closed vein loses its source of blood and dies. Eventually, you won't be able to see the veins anymore. The heat used for ablation can come from a laser or radio waves. A laser is a highly focused beam of light. Certain radio waves can make energy and heat called radiofrequency energy. How is the procedure done? The procedure is usually done in your doctor's office. You may wear some type of eye protection. You'll be given medicine so you will not feel anything or you will feel relaxed. The procedure takes less than 1 hour. Your doctor will insert a needle and wire into the vein. A thin tube (catheter) is placed over the wire and moved into the vein. Your doctor uses the catheter and special tools to send energy into the vein. The energy damages the tissue inside the vein. What can you expect after treatment? You may have a bandage and some bruising along the vein that was treated. You will need to wear compression stockings for 1 week or more. You can do your usual activities, but avoid vigorous exercise for about 1 week. Most people can get back to their normal routine right away. Follow-up care is a key part of your treatment and safety. Be sure to make and go to all appointments, and call your doctor if you are having problems. It's also a good idea to know your test results and keep a list of the medicines you take. Where can you learn more?   Go to http://www.gray.com/  Enter O522 in the search box to learn more about \"Learning About Endovenous Ablation for Varicose Veins. \"  Current as of: July 6, 2021               Content Version: 13.0  © 4395-9029 [employer]. Care instructions adapted under license by [employer] (which disclaims liability or warranty for this information). If you have questions about a medical condition or this instruction, always ask your healthcare professional. [address] any warranty or liability for your use of this information.

## 2021-10-22 ENCOUNTER — TELEPHONE (OUTPATIENT)
Dept: FAMILY MEDICINE CLINIC | Age: 66
End: 2021-10-22

## 2021-10-22 NOTE — TELEPHONE ENCOUNTER
Patient need to be called about his medication for gout he is not sure what he should be taking.  Please advise

## 2021-10-29 ENCOUNTER — TELEPHONE (OUTPATIENT)
Dept: FAMILY MEDICINE CLINIC | Age: 66
End: 2021-10-29

## 2021-10-29 NOTE — TELEPHONE ENCOUNTER
LMVM on wife's cell that Mr. Lorie Rojas should be taking Mitigare, the brand name drug in place of colchicine due to his insurance.

## 2021-11-10 ENCOUNTER — TELEPHONE (OUTPATIENT)
Dept: FAMILY MEDICINE CLINIC | Age: 66
End: 2021-11-10

## 2021-11-10 DIAGNOSIS — M10.9 GOUT, UNSPECIFIED CAUSE, UNSPECIFIED CHRONICITY, UNSPECIFIED SITE: ICD-10-CM

## 2021-11-10 NOTE — TELEPHONE ENCOUNTER
Patient requesting if he can have a stronger dosage for medication (colchicine). He also wants to know if the medication is interfering with his diabetes.  Please advise [phone]

## 2021-11-15 RX ORDER — COLCHICINE 0.6 MG/1
1.2 CAPSULE ORAL DAILY
Qty: 180 CAPSULE | Refills: 5 | Status: SHIPPED | OUTPATIENT
Start: 2021-11-15 | End: 2022-05-04

## 2021-11-29 DIAGNOSIS — J30.89 NON-SEASONAL ALLERGIC RHINITIS, UNSPECIFIED TRIGGER: ICD-10-CM

## 2021-12-01 RX ORDER — FLUNISOLIDE 0.25 MG/ML
SOLUTION NASAL
Qty: 25 ML | Refills: 12 | Status: SHIPPED | OUTPATIENT
Start: 2021-12-01

## 2021-12-14 ENCOUNTER — HOSPITAL ENCOUNTER (OUTPATIENT)
Dept: LAB | Age: 66
Discharge: HOME | End: 2021-12-14
Payer: MEDICARE

## 2021-12-14 ENCOUNTER — OFFICE VISIT (OUTPATIENT)
Dept: PULMONOLOGY | Age: 66
End: 2021-12-14
Payer: MEDICARE

## 2021-12-14 VITALS
WEIGHT: 249.6 LBS | RESPIRATION RATE: 16 BRPM | TEMPERATURE: 98.4 F | HEIGHT: 68 IN | SYSTOLIC BLOOD PRESSURE: 160 MMHG | HEART RATE: 89 BPM | BODY MASS INDEX: 37.83 KG/M2 | OXYGEN SATURATION: 97 % | DIASTOLIC BLOOD PRESSURE: 72 MMHG

## 2021-12-14 DIAGNOSIS — Z79.4 TYPE 2 DIABETES MELLITUS WITH HYPERGLYCEMIA, WITH LONG-TERM CURRENT USE OF INSULIN (HCC): ICD-10-CM

## 2021-12-14 DIAGNOSIS — J30.89 NON-SEASONAL ALLERGIC RHINITIS DUE TO OTHER ALLERGIC TRIGGER: ICD-10-CM

## 2021-12-14 DIAGNOSIS — R05.9 COUGH: Primary | ICD-10-CM

## 2021-12-14 DIAGNOSIS — E11.65 TYPE 2 DIABETES MELLITUS WITH HYPERGLYCEMIA, WITH LONG-TERM CURRENT USE OF INSULIN (HCC): ICD-10-CM

## 2021-12-14 DIAGNOSIS — R05.9 COUGH: ICD-10-CM

## 2021-12-14 DIAGNOSIS — H35.52 RETINITIS PIGMENTOSA OF BOTH EYES: ICD-10-CM

## 2021-12-14 DIAGNOSIS — I10 ESSENTIAL HYPERTENSION: ICD-10-CM

## 2021-12-14 PROCEDURE — G8754 DIAS BP LESS 90: HCPCS | Performed by: INTERNAL MEDICINE

## 2021-12-14 PROCEDURE — G8432 DEP SCR NOT DOC, RNG: HCPCS | Performed by: INTERNAL MEDICINE

## 2021-12-14 PROCEDURE — 86003 ALLG SPEC IGE CRUDE XTRC EA: CPT

## 2021-12-14 PROCEDURE — G8417 CALC BMI ABV UP PARAM F/U: HCPCS | Performed by: INTERNAL MEDICINE

## 2021-12-14 PROCEDURE — G8427 DOCREV CUR MEDS BY ELIG CLIN: HCPCS | Performed by: INTERNAL MEDICINE

## 2021-12-14 PROCEDURE — G8753 SYS BP > OR = 140: HCPCS | Performed by: INTERNAL MEDICINE

## 2021-12-14 PROCEDURE — 3051F HG A1C>EQUAL 7.0%<8.0%: CPT | Performed by: INTERNAL MEDICINE

## 2021-12-14 PROCEDURE — 2022F DILAT RTA XM EVC RTNOPTHY: CPT | Performed by: INTERNAL MEDICINE

## 2021-12-14 PROCEDURE — G8536 NO DOC ELDER MAL SCRN: HCPCS | Performed by: INTERNAL MEDICINE

## 2021-12-14 PROCEDURE — 99204 OFFICE O/P NEW MOD 45 MIN: CPT | Performed by: INTERNAL MEDICINE

## 2021-12-14 PROCEDURE — 3017F COLORECTAL CA SCREEN DOC REV: CPT | Performed by: INTERNAL MEDICINE

## 2021-12-14 PROCEDURE — 36415 COLL VENOUS BLD VENIPUNCTURE: CPT

## 2021-12-14 PROCEDURE — 82785 ASSAY OF IGE: CPT

## 2021-12-14 PROCEDURE — 1101F PT FALLS ASSESS-DOCD LE1/YR: CPT | Performed by: INTERNAL MEDICINE

## 2021-12-14 RX ORDER — DOXYCYCLINE 100 MG/1
100 CAPSULE ORAL 2 TIMES DAILY
Qty: 20 CAPSULE | Refills: 0 | Status: SHIPPED | OUTPATIENT
Start: 2021-12-14 | End: 2022-09-06 | Stop reason: ALTCHOICE

## 2021-12-14 RX ORDER — FLUTICASONE FUROATE AND VILANTEROL TRIFENATATE 200; 25 UG/1; UG/1
1 POWDER RESPIRATORY (INHALATION) DAILY
Qty: 14 EACH | Refills: 0 | Status: SHIPPED | COMMUNITY
Start: 2021-12-14 | End: 2021-12-27 | Stop reason: SDUPTHER

## 2021-12-14 NOTE — PATIENT INSTRUCTIONS
Managing Your Allergies: Care Instructions  Your Care Instructions     Managing your allergies is an important part of staying healthy. Your doctor will help you find out what may be the cause of the allergies. Common causes of symptoms are house dust and dust mites, animal dander, mold, and pollen. As soon as you know what triggers your symptoms, try to avoid those things. This can help prevent allergy symptoms, asthma, and other health problems. Ask your doctor about allergy medicine or immunotherapy. These treatments may help reduce or prevent allergy symptoms. Follow-up care is a key part of your treatment and safety. Be sure to make and go to all appointments, and call your doctor if you are having problems. It's also a good idea to know your test results and keep a list of the medicines you take. How can you care for yourself at home? · If you have been told by your doctor that dust or dust mites are causing your allergy, decrease the dust around your bed:  ? Wash sheets, pillowcases, and other bedding in hot water every week. ? Use dust-proof covers for pillows, duvets, and mattresses. Avoid plastic covers because they tear easily and do not \"breathe. \" Wash as instructed on the label. ? Do not use any blankets and pillows that you do not need. ? Use blankets that you can wash in your washing machine. ? Consider removing drapes and carpets, which attract and hold dust, from your bedroom. · If you are allergic to house dust and mites, do not use home humidifiers. Your doctor can suggest ways you can control dust and mites. · Look for signs of cockroaches. Cockroaches cause allergic reactions. Use cockroach baits to get rid of them. Then, clean your home well. Cockroaches like areas where grocery bags, newspapers, empty bottles, or cardboard boxes are stored. Do not keep these inside your home, and keep trash and food containers sealed.  Seal off any spots where cockroaches might enter your home.  · If you are allergic to mold, get rid of furniture, rugs, and drapes that smell musty. Check for mold in the bathroom. · If you are allergic to outdoor pollen or mold spores, use air-conditioning. Change or clean all filters every month. Keep windows closed. · If you are allergic to pollen, stay inside when pollen counts are high. Use a vacuum  with a HEPA filter or a double-thickness filter at least two times each week. · Stay inside when air pollution is bad. Avoid paint fumes, perfumes, and other strong odors. · Avoid conditions that make your allergies worse. Stay away from smoke. Do not smoke or let anyone else smoke in your house. Do not use fireplaces or wood-burning stoves. · If you are allergic to your pets, change the air filter in your furnace every month. Use high-efficiency filters. · If you are allergic to pet dander, keep pets outside or out of your bedroom. Old carpet and cloth furniture can hold a lot of animal dander. You may need to replace them. When should you call for help? Give an epinephrine shot if:    · You think you are having a severe allergic reaction. After giving an epinephrine shot call 911, even if you feel better. Call 911 if:    · You have symptoms of a severe allergic reaction. These may include:  ? Sudden raised, red areas (hives) all over your body. ? Swelling of the throat, mouth, lips, or tongue. ? Trouble breathing. ? Passing out (losing consciousness). Or you may feel very lightheaded or suddenly feel weak, confused, or restless.     · You have been given an epinephrine shot, even if you feel better. Call your doctor now or seek immediate medical care if:    · You have symptoms of an allergic reaction, such as:  ? A rash or hives (raised, red areas on the skin). ? Itching. ? Swelling. ? Belly pain, nausea, or vomiting.    Watch closely for changes in your health, and be sure to contact your doctor if:    · Your allergies get worse.     · You need help controlling your allergies.     · You have questions about allergy testing.     · You do not get better as expected. Where can you learn more? Go to http://www.gray.com/  Enter L249 in the search box to learn more about \"Managing Your Allergies: Care Instructions. \"  Current as of: February 10, 2021               Content Version: 13.0  © 0736-5352 [employer]. Care instructions adapted under license by [employer] (which disclaims liability or warranty for this information). If you have questions about a medical condition or this instruction, always ask your healthcare professional. [place] [address] any warranty or liability for your use of this information. Gastroesophageal Reflux Disease (GERD): Care Instructions  Overview     Gastroesophageal reflux disease (GERD) is the backward flow of stomach acid into the esophagus. The esophagus is the tube that leads from your throat to your stomach. A one-way valve prevents the stomach acid from backing up into this tube. But when you have GERD, this valve does not close tightly enough. This can also cause pain and swelling in your esophagus. (This is called esophagitis.)  If you have mild GERD symptoms including heartburn, you may be able to control the problem with antacids or over-the-counter medicine. You can also make lifestyle changes to help reduce your symptoms. These include changing your diet and eating habits, such as not eating late at night and losing weight. Follow-up care is a key part of your treatment and safety. Be sure to make and go to all appointments, and call your doctor if you are having problems. It's also a good idea to know your test results and keep a list of the medicines you take. How can you care for yourself at home? · Take your medicines exactly as prescribed. Call your doctor if you think you are having a problem with your medicine.   · Your doctor may recommend over-the-counter medicine. For mild or occasional indigestion, antacids, such as Tums, Gaviscon, Mylanta, or Maalox, may help. Your doctor also may recommend over-the-counter acid reducers, such as famotidine (Pepcid AC), cimetidine (Tagamet HB), or omeprazole (Prilosec). Read and follow all instructions on the label. If you use these medicines often, talk with your doctor. · Change your eating habits. ? It's best to eat several small meals instead of two or three large meals. ? After you eat, wait 2 to 3 hours before you lie down. ? Chocolate, mint, and alcohol can make GERD worse. ? Spicy foods, foods that have a lot of acid (like tomatoes and oranges), and coffee can make GERD symptoms worse in some people. If your symptoms are worse after you eat a certain food, you may want to stop eating that food to see if your symptoms get better. · Do not smoke or chew tobacco. Smoking can make GERD worse. If you need help quitting, talk to your doctor about stop-smoking programs and medicines. These can increase your chances of quitting for good. · If you have GERD symptoms at night, raise the head of your bed 6 to 8 inches by putting the frame on blocks or placing a foam wedge under the head of your mattress. (Adding extra pillows does not work.)  · Do not wear tight clothing around your middle. · Lose weight if you need to. Losing just 5 to 10 pounds can help. When should you call for help? Call your doctor now or seek immediate medical care if:    · You have new or different belly pain.     · Your stools are black and tarlike or have streaks of blood. Watch closely for changes in your health, and be sure to contact your doctor if:    · Your symptoms have not improved after 2 days.     · Food seems to catch in your throat or chest.   Where can you learn more?   Go to http://www.gray.com/  Enter C598 in the search box to learn more about \"Gastroesophageal Reflux Disease (GERD): Care Instructions. \"  Current as of: February 10, 2021               Content Version: 13.0  © 5904-9386 [employer]. Care instructions adapted under license by [employer] (which disclaims liability or warranty for this information). If you have questions about a medical condition or this instruction, always ask your healthcare professional. [place] [address] any warranty or liability for your use of this information.

## 2021-12-14 NOTE — PROGRESS NOTES
Phani Jalloh presents today for   Chief Complaint   Patient presents with    Cough with sputum       Is someone accompanying this pt? Wife    Is the patient using any DME equipment during OV? No    -DME Company NA    Depression Screening:  3 most recent PHQ Screens 10/21/2021   Little interest or pleasure in doing things Not at all   Feeling down, depressed, irritable, or hopeless Not at all   Total Score PHQ 2 0       Learning Assessment:  Learning Assessment 9/14/2021   PRIMARY LEARNER Patient   HIGHEST LEVEL OF EDUCATION - PRIMARY LEARNER  -   BARRIERS PRIMARY LEARNER -     -   [address] -    NEED -   LEARNER [address] -   ANSWERED BY Patient   RELATIONSHIP SELF       Abuse Screening:  Abuse Screening Questionnaire 5/6/2021   Do you ever feel afraid of your partner? N   Are you in a relationship with someone who physically or mentally threatens you? N   Is it safe for you to go home? Y       Fall Risk  Fall Risk Assessment, last 12 mths 10/21/2021   Able to walk? Yes   Fall in past 12 months? 0   Do you feel unsteady? 0   Are you worried about falling 0         Coordination of Care:  1. Have you been to the ER, urgent care clinic since your last visit? Hospitalized since your last visit? No    2. Have you seen or consulted any other health care providers outside of the [address] [place] [address] since your last visit? Include any pap smears or colon screening.  No

## 2021-12-14 NOTE — LETTER
12/14/2021    Patient: Mary Morris   YOB: 1955   Date of Visit: 12/14/2021     Dwayne Abarca MD  [id number]  [phone]  [address] [place]    Dear Dwayne Abarca MD,      Thank you for referring Mr. Mary Morris to [address] [place] [address] for evaluation. My notes for this consultation are attached. If you have questions, please do not hesitate to call me. I look forward to following your patient along with you.       Sincerely,    Gayle Jin MD

## 2021-12-14 NOTE — PROGRESS NOTES
[facility]  Pulmonary, Critical Care, and Sleep Medicine      Pulmonary Office Initial referral report    Name: Rylie Cortez     : 1955     Date: 2021        Subjective:   Patient has been referred for evaluation of: Chronic persistent cough    Patient is a 77 y.o. male who is a non-smoker states that he has been having a persistent cough for the last 3 to 4 months. He describes the cough as being predominantly at nighttime when he lays down to sleep and when he wakes up in the morning. Also during the night he wakes up due to the cough and cannot go back to sleep. Describes the cough as coming in bouts, starts with a tickle in his throat and persist to the point where he gets out of breath. He has noticed persistent nasal stuffiness and some postnasal drip and his wife mentions that he does have some wheezing as well. He was started on Singulair and nasal sprays with noticeable improvement in the cough. He still coughs but not as intense as before started. He denies any associated symptoms of dysphagia, difficulty swallowing. He does have some dyspepsia-and is now taking Prilosec. Wife mentions that he snores  Denies any fever chills or night sweats  He denies dyspnea on exertion-although states that he has gained some weight and trying to lose to help him function better  Patient is legally blind and retired from work 20 years back. He worked at [employer] where he was exposed to cool air and wetness but at that time it did not bother his breathing  He has been on [employer] 1 year  He had 1 ER visit at [facility] in 2020 with complaints of cough-chest x-ray was unremarkable.   No other etiology was determined at that time  Patient has received Covid vaccine including booster  Has received influenza vaccination    Comorbid conditions include- DM, HTN, history of retinitis pigmentosa  Smoking status: Never smoker    Occupational exposure-retired from [id number] [employer]  Does not have pets at home    Review of data:  I have personally reviewed all data-clinical encounters, imaging, outside test results pertinent to patient's care. Testing:  CXR  Labs    Past Medical History:   Diagnosis Date    Diabetes ([address]     DJD (degenerative joint disease) of knee     History of impotence     Hypertension     Legally blind 2007    both eyes    Proteinuria     Retinitis pigmentosa of both eyes     Venous (peripheral) insufficiency 10/21/2021       Past Surgical History:   Procedure Laterality Date    HX WISDOM TEETH EXTRACTION      x4     Social History     Socioeconomic History    Marital status:    Tobacco Use    Smoking status: Never Smoker    Smokeless tobacco: Never Used   Vaping Use    Vaping Use: Never used   Substance and Sexual Activity    Alcohol use: No    Drug use: Yes     Types: Prescription, OTC     Family History   Problem Relation Age of Onset    Heart Disease Mother     Heart Failure Mother     Kidney Disease Father     Diabetes Father     Cancer Father         prostate    Diabetes Sister     Hypertension Sister     Obesity Sister     Cancer Brother 52        prostate    Prostate Cancer Paternal Uncle      No Known Allergies    Current Outpatient Medications   Medication Sig Dispense Refill    flunisolide (NASAREL) 25 mcg (0.025 %) spry INSTILL 2 SPRAYS INTO EACH NOSTRIL TWICE A DAY 25 mL 12    colchicine (Mitigare) 0.6 mg capsule Take 2 Capsules by mouth daily. 180 Capsule 5    allopurinoL (ZYLOPRIM) 100 mg tablet Take 1 Tablet by mouth daily. 30 Tablet 12    Droplet Pen Needle 30 gauge x 5/16\" ndle       Droplet Pen Needle 31 gauge x 5/16\" ndle       omeprazole (PRILOSEC) 40 mg capsule Take 1 Capsule by mouth daily. 30 Capsule 5    Farxiga 10 mg tab tablet       levocetirizine (XYZAL) 5 mg tablet Take 1 Tablet by mouth daily. 30 Tablet 5    montelukast (Singulair) 10 mg tablet Take 1 Tablet by mouth daily.  27 Tablet 5    Droplet Insulin Syr,half unit, 0.5 mL 31 gauge x 5/16\" syrg       ibuprofen (MOTRIN) 800 mg tablet Take 1 Tab by mouth every eight (8) hours as needed for Pain. 90 Tab 3    tamsulosin (FLOMAX) 0.4 mg capsule Take 2 Caps by mouth daily. 180 Cap 3    glimepiride (AMARYL) 2 mg tablet       multivit-min/folic/vit K/lycop (ONE-A-DAY MEN'S 50 PLUS PO) Take  by mouth.  cholecalciferol (VITAMIN D3) (2,000 UNITS /50 MCG) cap capsule Take 2,000 Units by mouth daily.  pravastatin (PRAVACHOL) 20 mg tablet Take 1 Tab by mouth nightly. 90 Tab 4    telmisartan (MICARDIS) 40 mg tablet take 1 tablet by mouth once daily      insulin aspart, niacinamide, (FIASP FLEXTOUCH U-100 INSULIN SC) by SubCUTAneous route. Sliding scale      TOUJEO SOLOSTAR 300 unit/mL (1.5 mL) inpn 60 Units by SubCUTAneous route nightly.  hydrochlorothiazide (HYDRODIURIL) 25 mg tablet TAKE 1 TABLET EVERY DAY 90 Tab 3    amLODIPine (NORVASC) 10 mg tablet TAKE 1 TABLET EVERY DAY 90 Tab 3    metFORMIN (GLUCOPHAGE) 1,000 mg tablet Take 1 Tab by mouth two (2) times daily (with meals). Indications: TYPE 2 DIABETES MELLITUS 180 Tab 3    indomethacin (INDOCIN) 50 mg capsule take 1 capsule by mouth twice a day (Patient not taking: Reported on 12/14/2021)      aspirin 81 mg tablet Take 81 mg by mouth daily.  (Patient not taking: Reported on 8/6/2021)       Review of Systems:  HEENT: No epistaxis, no nasal drainage, no difficulty in swallowing, no redness in eyes  Respiratory: as above  Cardiovascular: no chest pain, no palpitations, no chronic leg edema, no syncope  Gastrointestinal: no abd pain, no vomiting, no diarrhea, no bleeding symptoms  Genitourinary: No urinary symptoms or hematuria  Integument/breast: No ulcers or rashes  Musculoskeletal:Neg  Neurological: No focal weakness, no seizures, no headaches  Behvioral/Psych: No anxiety, no depression  Constitutional: No fever, no chills, no weight loss, no night sweats Objective:     Visit Vitals  BP (!) 160/72 (BP 1 Location: Right upper arm, BP Patient Position: Sitting, BP Cuff Size: Large adult)   Pulse 89   Temp 98.4 °F (36.9 °C) (Oral)   Resp 16   Ht 5' 8\" (1.727 m)   Wt 113.2 kg (249 lb 9.6 oz)   SpO2 97% Comment: RA Rest   BMI 37.95 kg/m²        Physical Exam:   General: comfortable, no acute distress  HEENT: pupils reactive, sclera anicteric, EOM intact  Neck: No adenopathy or thyroid swelling, no lymphadenopathy or JVD, supple  CVS: S1S2 no murmurs  RS: Mod AE bilaterally, no tactile fremitus or egophony, no accessory muscle use  Abd: soft, non tender, no hepatosplenomegaly  Neuro: non focal, awake, alert  Extrm: no leg edema, clubbing or cyanosis  Skin: no rash    Data review:   Pertinent labs: CBC, BMP, LFT's        PFT:    Date FVC FEV1  FEV1/FVC [id number] TLC RV RV/TLC VC DLCO                 6 min walk test;      No results found for this or any previous visit. Imaging:  I have personally reviewed the patients radiographs and have reviewed the reports:  XR Results (most recent):  Results from Hospital Encounter encounter on 09/22/20    XR FOOT RT MIN 3 V    Narrative  History: Pain. TECHNIQUE: 3 views right foot. COMPARISON: None. FINDINGS:    Lisfranc joint is preserved. Moderate severe atherosclerosis. Calcaneal  enthesopathy. Mild degenerative changes of the midfoot. Probable os perineum. No  definite acute osseous abnormality. Linear radiopaque density overlies the soft  tissues of the distal calf. Correlate clinically. Impression  IMPRESSION:    No definite acute osseous abnormality. A few tiny ossific densities at the base  of the fifth metatarsal presumably os perineum. Correlate with point tenderness. Advanced atherosclerosis. Radiopaque foreign body soft tissues distal calf. Correlate clinically. CT Results (most recent):  No results found for this or any previous visit.     .     Patient Active Problem List   Diagnosis Code    Essential hypertension I10    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    Legally blind H54.8    Type 2 diabetes mellitus with hyperglycemia, with long-term current use of insulin ([facility]) E11.65, Z79.4    Benign prostatic hyperplasia with lower urinary tract symptoms N40.1    Feeling of incomplete bladder emptying R39.14    Nocturia R35.1    Stranguria R30.0    Hyperlipidemia associated with type 2 diabetes mellitus ([facility]) E11.69, E78.5    Family history of prostate cancer Z80.42    Non-seasonal allergic rhinitis J30.89    Venous (peripheral) insufficiency I87.2    Cough R05.9       IMPRESSION:   · Chronic persistent cough-characteristics very suggestive of upper airway cough syndrome/laryngeal reflex syndrome with likely triggers being postnasal drip from chronic rhinitis and sinusitis with a possible component of GERD-less likely by clinical history. Do not think that the cough is related to myocarditis  · Asthmatic bronchitis-secondary to above  · History of snoring-likely has sleep apnea  · Hypertension  · Diabetes  · Retinitis pigmentosa with blindness  · Obesity-BMI 37.95      RECOMMENDATIONS:   · Discussed with patient and wife differential diagnosis  · Agree with Singulair and nasarel  · Will add doxycycline for 10 days  · Add Breo 200, 1 puff daily as add-on therapy to address airway reactivity and wheezing  · Check PFTs and make further adjustments  · We will check IgE and [facility] allergen panel  · Discussed need for continued optimization of trigger control and monitor response and adjust treatment  · Continue Prilosec  · We will consider further imaging if symptoms persist  · Preventive aihwseiedbng-qv-un-date. Needs pneumococcal vaccine  · Follow-up in 6 weeks     Health maintenance screens deferred to Primary care provider.      Opal Aviles MD    This patient has a high complexity chronic care condition   This Visit needed Moderate/High complexity medically necessary decision making and management plans. Please note that this dictation was completed with [employer], the computer voice recognition software. Quite often unanticipated grammatical, syntax, homophones, and other interpretive errors are inadvertently transcribed by the computer software. Please disregard these errors. Please excuse any errors that have escaped final proofreading.

## 2021-12-17 LAB
A ALTERNATA IGE QN: <0.1 KU/L
A FUMIGATUS IGE QN: <0.1 KU/L
AMER ROACH IGE QN: 0.29 KU/L
AMER SYCAMORE IGE QN: <0.1 KU/L
BAHIA GRASS IGE QN: <0.1 KU/L
BERMUDA GRASS IGE QN: <0.1 KU/L
BOXELDER IGE QN: <0.1 KU/L
C HERBARUM IGE QN: <0.1 KU/L
CAT DANDER IGG QN: <0.1 KU/L
CLASS DESCRIPTION, 600268: ABNORMAL
COMMON RAGWEED IGE QN: <0.1 KU/L
D FARINAE IGE QN: 0.61 KU/L
D PTERONYSS IGE QN: 0.66 KU/L
DEPRECATED IGE QN: <0.1 KU/L
DOG DANDER IGE QN: <0.1 KU/L
ENGL PLANTAIN IGE QN: <0.1 KU/L
IGE SERPL-ACNC: 50 IU/ML (ref 6–495)
JOHNSON GRASS IGE QN: <0.1 KU/L
M RACEMOSUS IGE QN: <0.1 KU/L
MT JUNIPER IGE QN: <0.1 KU/L
MUGWORT IGE QN: <0.1 KU/L
NETTLE IGE QN: <0.1 KU/L
P NOTATUM IGE QN: <0.1 KU/L
S BOTRYOSUM IGE QN: <0.1 KU/L
SHEEP SORREL IGE QN: <0.1 KU/L
SWEET GUM IGE QN: <0.1 KU/L
TIMOTHY IGE QN: <0.1 KU/L
WHITE BIRCH IGE QN: <0.1 KU/L
WHITE ELM IGG QN: <0.1 KU/L
WHITE HICKORY IGE QN: <0.1 KU/L
WHITE MULBERRY IGE QN: <0.1 KU/L
WHITE OAK IGE QN: <0.1 KU/L

## 2021-12-27 ENCOUNTER — TELEPHONE (OUTPATIENT)
Dept: PULMONOLOGY | Age: 66
End: 2021-12-27

## 2021-12-27 DIAGNOSIS — M79.671 RIGHT FOOT PAIN: ICD-10-CM

## 2021-12-27 RX ORDER — FLUTICASONE FUROATE AND VILANTEROL TRIFENATATE 200; 25 UG/1; UG/1
1 POWDER RESPIRATORY (INHALATION) DAILY
Qty: 1 EACH | Refills: 0 | Status: SHIPPED | COMMUNITY
Start: 2021-12-27 | End: 2022-01-25 | Stop reason: SDUPTHER

## 2021-12-27 RX ORDER — FLUTICASONE FUROATE AND VILANTEROL TRIFENATATE 200; 25 UG/1; UG/1
1 POWDER RESPIRATORY (INHALATION) DAILY
Qty: 1 EACH | Refills: 5 | Status: SHIPPED | OUTPATIENT
Start: 2021-12-27 | End: 2021-12-27 | Stop reason: SDUPTHER

## 2021-12-27 NOTE — TELEPHONE ENCOUNTER
Per wife, the medication is a little too pricey, they would like to know if we have samples. Please call [phone].

## 2021-12-27 NOTE — TELEPHONE ENCOUNTER
Patient Bar Ceballos would cost $97. He is going to a new insurance Jan 1. Would like sample to hold over.

## 2021-12-28 RX ORDER — IBUPROFEN 800 MG/1
TABLET ORAL
Qty: 90 TABLET | Refills: 3 | Status: SHIPPED | OUTPATIENT
Start: 2021-12-28 | End: 2022-05-19 | Stop reason: SDUPTHER

## 2022-01-07 DIAGNOSIS — J30.89 NON-SEASONAL ALLERGIC RHINITIS, UNSPECIFIED TRIGGER: ICD-10-CM

## 2022-01-07 RX ORDER — MONTELUKAST SODIUM 10 MG/1
TABLET ORAL
Qty: 30 TABLET | Refills: 5 | Status: SHIPPED | OUTPATIENT
Start: 2022-01-07 | End: 2022-01-12 | Stop reason: SDUPTHER

## 2022-01-07 RX ORDER — LEVOCETIRIZINE DIHYDROCHLORIDE 5 MG/1
TABLET, FILM COATED ORAL
Qty: 30 TABLET | Refills: 5 | Status: SHIPPED | OUTPATIENT
Start: 2022-01-07 | End: 2022-01-12 | Stop reason: SDUPTHER

## 2022-01-10 ENCOUNTER — OFFICE VISIT (OUTPATIENT)
Dept: FAMILY MEDICINE CLINIC | Age: 67
End: 2022-01-10
Payer: MEDICARE

## 2022-01-10 VITALS
TEMPERATURE: 99.4 F | RESPIRATION RATE: 18 BRPM | WEIGHT: 251 LBS | DIASTOLIC BLOOD PRESSURE: 86 MMHG | HEART RATE: 90 BPM | SYSTOLIC BLOOD PRESSURE: 140 MMHG | BODY MASS INDEX: 38.04 KG/M2 | OXYGEN SATURATION: 98 % | HEIGHT: 68 IN

## 2022-01-10 DIAGNOSIS — R05.9 COUGH: ICD-10-CM

## 2022-01-10 DIAGNOSIS — E66.01 SEVERE OBESITY (BMI 35.0-35.9 WITH COMORBIDITY) (HCC): ICD-10-CM

## 2022-01-10 DIAGNOSIS — E11.65 TYPE 2 DIABETES MELLITUS WITH HYPERGLYCEMIA, WITH LONG-TERM CURRENT USE OF INSULIN (HCC): ICD-10-CM

## 2022-01-10 DIAGNOSIS — I10 ESSENTIAL HYPERTENSION: Primary | ICD-10-CM

## 2022-01-10 DIAGNOSIS — E78.5 HYPERLIPIDEMIA ASSOCIATED WITH TYPE 2 DIABETES MELLITUS (HCC): ICD-10-CM

## 2022-01-10 DIAGNOSIS — Z79.4 TYPE 2 DIABETES MELLITUS WITH HYPERGLYCEMIA, WITH LONG-TERM CURRENT USE OF INSULIN (HCC): ICD-10-CM

## 2022-01-10 DIAGNOSIS — M10.9 GOUT, UNSPECIFIED CAUSE, UNSPECIFIED CHRONICITY, UNSPECIFIED SITE: ICD-10-CM

## 2022-01-10 DIAGNOSIS — E11.69 HYPERLIPIDEMIA ASSOCIATED WITH TYPE 2 DIABETES MELLITUS (HCC): ICD-10-CM

## 2022-01-10 PROCEDURE — G8753 SYS BP > OR = 140: HCPCS | Performed by: FAMILY MEDICINE

## 2022-01-10 PROCEDURE — G8417 CALC BMI ABV UP PARAM F/U: HCPCS | Performed by: FAMILY MEDICINE

## 2022-01-10 PROCEDURE — G8427 DOCREV CUR MEDS BY ELIG CLIN: HCPCS | Performed by: FAMILY MEDICINE

## 2022-01-10 PROCEDURE — 3046F HEMOGLOBIN A1C LEVEL >9.0%: CPT | Performed by: FAMILY MEDICINE

## 2022-01-10 PROCEDURE — G8754 DIAS BP LESS 90: HCPCS | Performed by: FAMILY MEDICINE

## 2022-01-10 PROCEDURE — 1101F PT FALLS ASSESS-DOCD LE1/YR: CPT | Performed by: FAMILY MEDICINE

## 2022-01-10 PROCEDURE — 99214 OFFICE O/P EST MOD 30 MIN: CPT | Performed by: FAMILY MEDICINE

## 2022-01-10 PROCEDURE — G8432 DEP SCR NOT DOC, RNG: HCPCS | Performed by: FAMILY MEDICINE

## 2022-01-10 PROCEDURE — 2022F DILAT RTA XM EVC RTNOPTHY: CPT | Performed by: FAMILY MEDICINE

## 2022-01-10 PROCEDURE — G8536 NO DOC ELDER MAL SCRN: HCPCS | Performed by: FAMILY MEDICINE

## 2022-01-10 PROCEDURE — 3017F COLORECTAL CA SCREEN DOC REV: CPT | Performed by: FAMILY MEDICINE

## 2022-01-10 NOTE — PROGRESS NOTES
Augustine Schulz presents today for   Chief Complaint   Patient presents with    Follow-up      2 m follow up patient has seen pulmonolgy and urology since his last visit . patient would like all Rx refilled for 90 days instead of 30 to go to [facility] Hypertension    GERD    Gout       Is someone accompanying this pt? Yes   Is the patient using any DME equipment during OV? No     Depression Screening:  3 most recent PHQ Screens 10/21/2021   Little interest or pleasure in doing things Not at all   Feeling down, depressed, irritable, or hopeless Not at all   Total Score PHQ 2 0       Learning Assessment:  Learning Assessment 9/14/2021   PRIMARY LEARNER Patient   HIGHEST LEVEL OF EDUCATION - PRIMARY LEARNER  -   BARRIERS PRIMARY LEARNER -     -   [address] -    NEED -   LEARNER PREFERENCE PRIMARY DEMONSTRATION     -   LEARNER [address] -   ANSWERED BY Patient   RELATIONSHIP SELF       Abuse Screening:  Abuse Screening Questionnaire 5/6/2021   Do you ever feel afraid of your partner? N   Are you in a relationship with someone who physically or mentally threatens you? N   Is it safe for you to go home? Y       Fall Screening  Fall Risk Assessment, last 12 mths 10/21/2021   Able to walk? Yes   Fall in past 12 months? 0   Do you feel unsteady? 0   Are you worried about falling 0       Generalized Anxiety  No flowsheet data found. Health Maintenance Due   Topic Date Due    Hepatitis C Screening  Never done    Pneumococcal 65+ years (2 of 2 - PPSV23) 11/04/2021    Lipid Screen  11/17/2021    MICROALBUMIN Q1  11/18/2021    A1C test (Diabetic or Prediabetic)  12/24/2021    Medicare Yearly Exam  01/07/2022   . Health Maintenance reviewed and discussed and ordered per Provider.     Auugstine Schulz is updated on all HM    1. \"Have you been to the ER, urgent care clinic since your last visit? Hospitalized since your last visit? \" no     2. \"Have you seen or consulted any other health care providers outside of the [address] [place] [address] since your last visit? \" Dr. Perla Man[facility]

## 2022-01-10 NOTE — PROGRESS NOTES
Chief Complaint   Patient presents with    Follow-up      2 m follow up patient has seen pulmonolgy and urology since his last visit . patient would like all Rx refilled for 90 days instead of 30 to go to [facility] Hypertension    GERD    Gout         HPI    Tisha Agudelo is a 79 y.o. male presenting today for 3 months  follow up of htn, gerd, gout. With initiation of tx for his gout, pt reports that he has not had any flare ups. Pt saw pulm for chronic cough. breo was added with minimal improvement. Pt cont to see endo for his dm. His next appt is this month. Home bs readings are usually 120-140s in the am.  Today it was 98. New concerns today: pt is changing his pharmacy from [facility] to [facility] in [place]. Review of Systems   Constitutional: Negative. HENT: Negative. Respiratory: Negative. Cardiovascular: Negative. All other systems reviewed and are negative. Physical Exam  Vitals and nursing note reviewed. Constitutional:       General: He is not in acute distress. Appearance: Normal appearance. HENT:      Head: Normocephalic and atraumatic. Right Ear: External ear normal.      Left Ear: External ear normal.      Nose: Nose normal.   Eyes:      Extraocular Movements: Extraocular movements intact. Conjunctiva/sclera: Conjunctivae normal.   Cardiovascular:      Rate and Rhythm: Normal rate and regular rhythm. Heart sounds: No murmur heard. No friction rub. No gallop. Pulmonary:      Effort: Pulmonary effort is normal.      Breath sounds: Normal breath sounds. No wheezing, rhonchi or rales. Musculoskeletal:         General: Normal range of motion. Cervical back: Normal range of motion. No rigidity. Skin:     General: Skin is warm and dry. Neurological:      Mental Status: He is alert and oriented to person, place, and time.       Coordination: Coordination normal.   Psychiatric:         Mood and Affect: Mood normal. Behavior: Behavior normal.         Thought Content: Thought content normal.         Judgment: Judgment normal.         Diagnoses and all orders for this visit:    1. Essential hypertension  Stable, cont pres tx plan. 2. Severe obesity (BMI 35.0-35.9 with comorbidity) ([facility]  Work on diet    3. Type 2 diabetes mellitus with hyperglycemia, with long-term current use of insulin (HCC)  Assessment & Plan:  Care as per endo. appt soon. 4. Hyperlipidemia associated with type 2 diabetes mellitus ([facility]  Await labs from endo    5. Gout, unspecified cause, unspecified chronicity, unspecified site  Improved; cont pres tx plan. 6. Cough  Improved somewhat; care as per pulm    Follow-up and Dispositions    · Return in about 3 months (around 4/10/2022) for high blood pressure, diabetes, high cholesterol, gout.

## 2022-01-12 DIAGNOSIS — M10.9 GOUT, UNSPECIFIED CAUSE, UNSPECIFIED CHRONICITY, UNSPECIFIED SITE: ICD-10-CM

## 2022-01-12 DIAGNOSIS — K21.9 GASTROESOPHAGEAL REFLUX DISEASE, UNSPECIFIED WHETHER ESOPHAGITIS PRESENT: ICD-10-CM

## 2022-01-12 DIAGNOSIS — J30.89 NON-SEASONAL ALLERGIC RHINITIS, UNSPECIFIED TRIGGER: ICD-10-CM

## 2022-01-12 RX ORDER — OMEPRAZOLE 40 MG/1
40 CAPSULE, DELAYED RELEASE ORAL DAILY
Qty: 30 CAPSULE | Refills: 5 | Status: SHIPPED | OUTPATIENT
Start: 2022-01-12 | End: 2022-06-10 | Stop reason: SDUPTHER

## 2022-01-12 RX ORDER — MONTELUKAST SODIUM 10 MG/1
10 TABLET ORAL DAILY
Qty: 30 TABLET | Refills: 5 | Status: SHIPPED | OUTPATIENT
Start: 2022-01-12 | End: 2022-06-10 | Stop reason: SDUPTHER

## 2022-01-12 RX ORDER — ALLOPURINOL 100 MG/1
100 TABLET ORAL DAILY
Qty: 30 TABLET | Refills: 12 | Status: SHIPPED | OUTPATIENT
Start: 2022-01-12

## 2022-01-12 RX ORDER — LEVOCETIRIZINE DIHYDROCHLORIDE 5 MG/1
5 TABLET, FILM COATED ORAL DAILY
Qty: 30 TABLET | Refills: 5 | Status: SHIPPED | OUTPATIENT
Start: 2022-01-12 | End: 2022-06-10 | Stop reason: SDUPTHER

## 2022-01-12 NOTE — TELEPHONE ENCOUNTER
----- Message from [facility] sent at 1/11/2022  2:01 PM EST -----  Subject: Message to Provider    QUESTIONS  Information for Provider? The patient is calling today because his   medications were sent to the incorrect pharmacy following his office visit   yesterday. The patient was seen by Justin Weir 1/10/22, and he states   that there were supposed to be 4 medications sent to [facility] in [place]. [facility] never received the orders. Patient states that there are to be 4   prescriptions. Please call the patient  ---------------------------------------------------------------------------  --------------  CALL BACK INFO  What is the best way for the office to contact you? OK to leave message on   voicemail  Preferred Call Back Phone Number? [phone]  ---------------------------------------------------------------------------  --------------  SCRIPT ANSWERS  Relationship to Patient?  Self

## 2022-01-12 NOTE — TELEPHONE ENCOUNTER
Patient called to request these medications be filled. [facility] already has a refill request in, however, it was sent to wrong pharmacy. Please advise     Requested Prescriptions     Pending Prescriptions Disp Refills    levocetirizine (XYZAL) 5 mg tablet 30 Tablet 5     Sig: Take 1 Tablet by mouth daily.  montelukast (SINGULAIR) 10 mg tablet 30 Tablet 5     Sig: Take 1 Tablet by mouth daily.  omeprazole (PRILOSEC) 40 mg capsule 30 Capsule 5     Sig: Take 1 Capsule by mouth daily.  allopurinoL (ZYLOPRIM) 100 mg tablet 30 Tablet 12     Sig: Take 1 Tablet by mouth daily.

## 2022-01-13 ENCOUNTER — DOCUMENTATION ONLY (OUTPATIENT)
Dept: PULMONOLOGY | Age: 67
End: 2022-01-13

## 2022-01-24 DIAGNOSIS — E11.65 TYPE 2 DIABETES MELLITUS WITH HYPERGLYCEMIA, WITH LONG-TERM CURRENT USE OF INSULIN (HCC): ICD-10-CM

## 2022-01-24 DIAGNOSIS — Z79.4 TYPE 2 DIABETES MELLITUS WITH HYPERGLYCEMIA, WITH LONG-TERM CURRENT USE OF INSULIN (HCC): ICD-10-CM

## 2022-01-24 NOTE — TELEPHONE ENCOUNTER
Patient requesting refill for this medication, please advise. Requested Prescriptions     Pending Prescriptions Disp Refills    pravastatin (PRAVACHOL) 20 mg tablet 90 Tablet 4     Sig: Take 1 Tablet by mouth nightly.

## 2022-01-25 ENCOUNTER — OFFICE VISIT (OUTPATIENT)
Dept: PULMONOLOGY | Age: 67
End: 2022-01-25
Payer: MEDICARE

## 2022-01-25 ENCOUNTER — TELEPHONE (OUTPATIENT)
Dept: PULMONOLOGY | Age: 67
End: 2022-01-25

## 2022-01-25 VITALS — HEIGHT: 68 IN | BODY MASS INDEX: 37.89 KG/M2 | WEIGHT: 250 LBS

## 2022-01-25 DIAGNOSIS — R05.9 COUGH: Primary | ICD-10-CM

## 2022-01-25 PROCEDURE — 94729 DIFFUSING CAPACITY: CPT | Performed by: INTERNAL MEDICINE

## 2022-01-25 PROCEDURE — 94010 BREATHING CAPACITY TEST: CPT | Performed by: INTERNAL MEDICINE

## 2022-01-25 PROCEDURE — 94727 GAS DIL/WSHOT DETER LNG VOL: CPT | Performed by: INTERNAL MEDICINE

## 2022-01-25 RX ORDER — PRAVASTATIN SODIUM 20 MG/1
20 TABLET ORAL
Qty: 90 TABLET | Refills: 4 | Status: SHIPPED | OUTPATIENT
Start: 2022-01-25

## 2022-01-26 RX ORDER — FLUTICASONE FUROATE AND VILANTEROL TRIFENATATE 200; 25 UG/1; UG/1
1 POWDER RESPIRATORY (INHALATION) DAILY
Qty: 1 EACH | Refills: 5 | Status: SHIPPED
Start: 2022-01-26 | End: 2022-05-04

## 2022-01-31 ENCOUNTER — OFFICE VISIT (OUTPATIENT)
Dept: PULMONOLOGY | Age: 67
End: 2022-01-31
Payer: MEDICARE

## 2022-01-31 VITALS
DIASTOLIC BLOOD PRESSURE: 70 MMHG | HEART RATE: 108 BPM | SYSTOLIC BLOOD PRESSURE: 146 MMHG | BODY MASS INDEX: 37.89 KG/M2 | TEMPERATURE: 98.2 F | OXYGEN SATURATION: 98 % | WEIGHT: 250 LBS | RESPIRATION RATE: 16 BRPM | HEIGHT: 68 IN

## 2022-01-31 DIAGNOSIS — J30.1 NON-SEASONAL ALLERGIC RHINITIS DUE TO POLLEN: ICD-10-CM

## 2022-01-31 DIAGNOSIS — R05.9 COUGH: Primary | ICD-10-CM

## 2022-01-31 PROCEDURE — G8754 DIAS BP LESS 90: HCPCS | Performed by: INTERNAL MEDICINE

## 2022-01-31 PROCEDURE — G8536 NO DOC ELDER MAL SCRN: HCPCS | Performed by: INTERNAL MEDICINE

## 2022-01-31 PROCEDURE — 1101F PT FALLS ASSESS-DOCD LE1/YR: CPT | Performed by: INTERNAL MEDICINE

## 2022-01-31 PROCEDURE — 3017F COLORECTAL CA SCREEN DOC REV: CPT | Performed by: INTERNAL MEDICINE

## 2022-01-31 PROCEDURE — G8753 SYS BP > OR = 140: HCPCS | Performed by: INTERNAL MEDICINE

## 2022-01-31 PROCEDURE — G8427 DOCREV CUR MEDS BY ELIG CLIN: HCPCS | Performed by: INTERNAL MEDICINE

## 2022-01-31 PROCEDURE — G8417 CALC BMI ABV UP PARAM F/U: HCPCS | Performed by: INTERNAL MEDICINE

## 2022-01-31 PROCEDURE — 99214 OFFICE O/P EST MOD 30 MIN: CPT | Performed by: INTERNAL MEDICINE

## 2022-01-31 PROCEDURE — G8432 DEP SCR NOT DOC, RNG: HCPCS | Performed by: INTERNAL MEDICINE

## 2022-01-31 RX ORDER — ASCORBIC ACID 500 MG
500 TABLET ORAL
COMMUNITY

## 2022-01-31 NOTE — LETTER
1/31/2022    Patient: Soraida Hassan   YOB: 1955   Date of Visit: 1/31/2022     Donna Naidu MD  [id number]  [phone]  [address] [place]    Dear Donna Naidu MD,      Thank you for referring Mr. Soraida Hassan to [address] [place] [address] for evaluation. My notes for this consultation are attached. If you have questions, please do not hesitate to call me. I look forward to following your patient along with you.       Sincerely,    Nhung Siu MD

## 2022-01-31 NOTE — PROGRESS NOTES
Kami Benitez presents today for   Chief Complaint   Patient presents with    Results     PFT, Labs       Is someone accompanying this pt? Wife    Is the patient using any DME equipment during OV? No    -DME Company NA    Depression Screening:  3 most recent PHQ Screens 10/21/2021   Little interest or pleasure in doing things Not at all   Feeling down, depressed, irritable, or hopeless Not at all   Total Score PHQ 2 0       Learning Assessment:  Learning Assessment 9/14/2021   PRIMARY LEARNER Patient   HIGHEST LEVEL OF EDUCATION - PRIMARY LEARNER  -   BARRIERS PRIMARY LEARNER -     -   [address] -    NEED -   LEARNER [address] -   ANSWERED BY Patient   RELATIONSHIP SELF       Abuse Screening:  Abuse Screening Questionnaire 5/6/2021   Do you ever feel afraid of your partner? N   Are you in a relationship with someone who physically or mentally threatens you? N   Is it safe for you to go home? Y       Fall Risk  Fall Risk Assessment, last 12 mths 10/21/2021   Able to walk? Yes   Fall in past 12 months? 0   Do you feel unsteady? 0   Are you worried about falling 0         Coordination of Care:  1. Have you been to the ER, urgent care clinic since your last visit? Hospitalized since your last visit? No    2. Have you seen or consulted any other health care providers outside of the [address] [place] [address] since your last visit? Include any pap smears or colon screening.  No

## 2022-01-31 NOTE — PROGRESS NOTES
[facility]  Pulmonary, Critical Care, and Sleep Medicine      Pulmonary Office visit    Name: Janice Cortes     : 1955     Date: 2022        Subjective:   Patient has been referred for evaluation of: Chronic persistent cough    22  Patient reports improvement in cough. The Breo definitely helped him but he could not get it refilled due to the cost of the medication. He states that the cough is not as bothersome and does not wake him up from sleep anymore  Denies any significant nasal congestion-continues to take his nasal spray  He has no other new complaints    HPI  Patient is a 79 y.o. male who is a non-smoker states that he has been having a persistent cough for the last 3 to 4 months. He describes the cough as being predominantly at nighttime when he lays down to sleep and when he wakes up in the morning. Also during the night he wakes up due to the cough and cannot go back to sleep. Describes the cough as coming in bouts, starts with a tickle in his throat and persist to the point where he gets out of breath. He has noticed persistent nasal stuffiness and some postnasal drip and his wife mentions that he does have some wheezing as well. He was started on Singulair and nasal sprays with noticeable improvement in the cough. He still coughs but not as intense as before started. He denies any associated symptoms of dysphagia, difficulty swallowing. He does have some dyspepsia-and is now taking Prilosec. Wife mentions that he snores  Denies any fever chills or night sweats  He denies dyspnea on exertion-although states that he has gained some weight and trying to lose to help him function better  Patient is legally blind and retired from work 20 years back.   He worked at [employer] where he was exposed to cool air and wetness but at that time it did not bother his breathing  He has been on [employer] 1 year  He had 1 ER visit at [facility] in December 2020 with complaints of cough-chest x-ray was unremarkable. No other etiology was determined at that time  Patient has received Covid vaccine including booster  Has received influenza vaccination    Comorbid conditions include- DM, HTN, history of retinitis pigmentosa  Smoking status: Never smoker    Occupational exposure-retired from [employer]  Does not have pets at home    Review of data:  I have personally reviewed all data-clinical encounters, imaging, outside test results pertinent to patient's care. Testing:  CXR  Labs    Past Medical History:   Diagnosis Date    Diabetes ([address]     DJD (degenerative joint disease) of knee     History of impotence     Hypertension     Legally blind 2007    both eyes    Other and unspecified hyperlipidemia 3/10/2009    Proteinuria     Retinitis pigmentosa of both eyes     Right knee DJD 3/10/2009    Venous (peripheral) insufficiency 10/21/2021     Past Surgical History:   Procedure Laterality Date    HX WISDOM TEETH EXTRACTION      x4   No Known Allergies    Current Outpatient Medications   Medication Sig Dispense Refill    fluticasone furoate-vilanteroL (Breo Ellipta) 200-25 mcg/dose inhaler Take 1 Puff by inhalation daily. 1 Each 5    pravastatin (PRAVACHOL) 20 mg tablet Take 1 Tablet by mouth nightly. 90 Tablet 4    levocetirizine (XYZAL) 5 mg tablet Take 1 Tablet by mouth daily. 30 Tablet 5    montelukast (SINGULAIR) 10 mg tablet Take 1 Tablet by mouth daily. 30 Tablet 5    omeprazole (PRILOSEC) 40 mg capsule Take 1 Capsule by mouth daily. 30 Capsule 5    allopurinoL (ZYLOPRIM) 100 mg tablet Take 1 Tablet by mouth daily. 30 Tablet 12     mg tablet TAKE 1 TABLET EVERY 8 HOURS AS NEEDED FOR PAIN 90 Tablet 3    doxycycline (VIBRAMYCIN) 100 mg capsule Take 1 Capsule by mouth two (2) times a day.  (Patient not taking: Reported on 1/10/2022) 20 Capsule 0    flunisolide (NASAREL) 25 mcg (0.025 %) spry INSTILL 2 SPRAYS INTO EACH NOSTRIL TWICE A DAY 25 mL 12    colchicine (Mitigare) 0.6 mg capsule Take 2 Capsules by mouth daily. 180 Capsule 5    Droplet Pen Needle 30 gauge x 5/16\" ndle       Droplet Pen Needle 31 gauge x 5/16\" ndle       Farxiga 10 mg tab tablet       indomethacin (INDOCIN) 50 mg capsule take 1 capsule by mouth twice a day      Droplet Insulin Syr,half unit, 0.5 mL 31 gauge x 5/16\" syrg       tamsulosin (FLOMAX) 0.4 mg capsule Take 2 Caps by mouth daily. 180 Cap 3    glimepiride (AMARYL) 2 mg tablet       multivit-min/folic/vit K/lycop (ONE-A-DAY MEN'S 50 PLUS PO) Take  by mouth.  cholecalciferol (VITAMIN D3) (2,000 UNITS /50 MCG) cap capsule Take 2,000 Units by mouth daily.  telmisartan (MICARDIS) 40 mg tablet take 1 tablet by mouth once daily      insulin aspart, niacinamide, (FIASP FLEXTOUCH U-100 INSULIN SC) by SubCUTAneous route. Sliding scale      TOUJEO SOLOSTAR 300 unit/mL (1.5 mL) inpn 60 Units by SubCUTAneous route nightly.  hydrochlorothiazide (HYDRODIURIL) 25 mg tablet TAKE 1 TABLET EVERY DAY 90 Tab 3    amLODIPine (NORVASC) 10 mg tablet TAKE 1 TABLET EVERY DAY 90 Tab 3    metFORMIN (GLUCOPHAGE) 1,000 mg tablet Take 1 Tab by mouth two (2) times daily (with meals). Indications: TYPE 2 DIABETES MELLITUS 180 Tab 3    aspirin 81 mg tablet Take 81 mg by mouth daily.  (Patient not taking: Reported on 8/6/2021)       Review of Systems:  HEENT: No epistaxis, no nasal drainage, no difficulty in swallowing, no redness in eyes  Respiratory: as above  Cardiovascular: no chest pain, no palpitations, no chronic leg edema, no syncope  Gastrointestinal: no abd pain, no vomiting, no diarrhea, no bleeding symptoms  Genitourinary: No urinary symptoms or hematuria  Integument/breast: No ulcers or rashes  Musculoskeletal:Neg  Neurological: No focal weakness, no seizures, no headaches  Behvioral/Psych: No anxiety, no depression  Constitutional: No fever, no chills, no weight loss, no night sweats     Objective:     Visit Vitals  BP (!) 152/76 (BP 1 Location: Left upper arm, BP Patient Position: Sitting, BP Cuff Size: Large adult)   Pulse (!) 108   Temp 98.2 °F (36.8 °C) (Oral)   Resp 16   Ht 5' 8\" (1.727 m)   Wt 113.4 kg (250 lb)   SpO2 98% Comment: RA Rest   BMI 38.01 kg/m²        Physical Exam:   General: comfortable, no acute distress  HEENT: pupils reactive, sclera anicteric, EOM intact  Neck: No adenopathy or thyroid swelling, no lymphadenopathy or JVD, supple  CVS: S1S2 no murmurs  RS: Mod AE bilaterally, no tactile fremitus or egophony, no accessory muscle use  Abd: soft, non tender, no hepatosplenomegaly  Neuro: non focal, awake, alert  Extrm: no leg edema, clubbing or cyanosis  Skin: no rash    Data review:   Pertinent labs: CBC, BMP, LFT's    PFT:                No results found for this or any previous visit. Imaging:  I have personally reviewed the patients radiographs and have reviewed the reports:  XR Results (most recent):  Results from Hospital Encounter encounter on 09/22/20    XR FOOT RT MIN 3 V    Narrative  History: Pain. TECHNIQUE: 3 views right foot. COMPARISON: None. FINDINGS:    Lisfranc joint is preserved. Moderate severe atherosclerosis. Calcaneal  enthesopathy. Mild degenerative changes of the midfoot. Probable os perineum. No  definite acute osseous abnormality. Linear radiopaque density overlies the soft  tissues of the distal calf. Correlate clinically. Impression  IMPRESSION:    No definite acute osseous abnormality. A few tiny ossific densities at the base  of the fifth metatarsal presumably os perineum. Correlate with point tenderness. Advanced atherosclerosis. Radiopaque foreign body soft tissues distal calf. Correlate clinically. CT Results (most recent):  No results found for this or any previous visit.     .     Patient Active Problem List   Diagnosis Code    Essential hypertension I10    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    Legally blind H54.8    Type 2 diabetes mellitus with hyperglycemia, with long-term current use of insulin ([facility]) E11.65, Z79.4    Benign prostatic hyperplasia with lower urinary tract symptoms N40.1    Feeling of incomplete bladder emptying R39.14    Nocturia R35.1    Stranguria R30.0    Hyperlipidemia associated with type 2 diabetes mellitus (HCC) E11.69, E78.5    Family history of prostate cancer Z80.42    Non-seasonal allergic rhinitis J30.89    Venous (peripheral) insufficiency I87.2    Cough R05.9    Impotence of organic origin N52.9    Other and unspecified hyperlipidemia E78.5    Proteinuria R80.9    Right knee DJD M17.11       IMPRESSION:   · Chronic persistent cough-characteristics very suggestive of upper airway cough syndrome/laryngeal reflex syndrome with likely triggers being postnasal drip from chronic rhinitis and sinusitis with a possible component of GERD-less likely by clinical history. Doubt related to Micardis. Significant improvement noted with current treatment  · Asthmatic bronchitis-secondary to above  · History of snoring-likely has sleep apnea  · Hypertension  · Diabetes  · Retinitis pigmentosa with blindness  · Obesity-BMI 37.95      RECOMMENDATIONS:   · Discussed with patient and wife differential diagnosis  · Agree with Singulair and nasarel  · Will continue bronchodilators-in view of difficulty obtaining Breo I have provided him with samples of Anoro  · Discussed results of allergy testing-advised dust control measures at home-obtain HEPA filter for bedroom, cover plush furniture, beddings with plastic. Wife mentions frequent vacuuming but she feels the vacuum itself may be blowing dust  · Discussed need for continued optimization of trigger control and monitor response and adjust treatment  · Continue Prilosec  · We will consider further imaging if symptoms persist  · Preventive uaxqhmcgmaqk-tv-gt-date.   Needs pneumococcal vaccine-will await arrival of Prevnar 20  · Follow-up in 4 months     Health maintenance screens deferred to Primary care provider. Gurpreet Florez MD    This patient has a high complexity chronic care condition   This Visit needed Moderate/High complexity medically necessary decision making and management plans. Please note that this dictation was completed with [employer], the computer voice recognition software. Quite often unanticipated grammatical, syntax, homophones, and other interpretive errors are inadvertently transcribed by the computer software. Please disregard these errors. Please excuse any errors that have escaped final proofreading.

## 2022-03-18 PROBLEM — R05.9 COUGH: Status: ACTIVE | Noted: 2021-12-14

## 2022-03-19 PROBLEM — Z80.42 FAMILY HISTORY OF PROSTATE CANCER: Status: ACTIVE | Noted: 2020-10-07

## 2022-03-19 PROBLEM — E11.69 HYPERLIPIDEMIA ASSOCIATED WITH TYPE 2 DIABETES MELLITUS (HCC): Status: ACTIVE | Noted: 2020-10-07

## 2022-03-19 PROBLEM — R35.1 NOCTURIA: Status: ACTIVE | Noted: 2020-10-05

## 2022-03-19 PROBLEM — R30.0 STRANGURIA: Status: ACTIVE | Noted: 2020-10-05

## 2022-03-19 PROBLEM — R39.14 FEELING OF INCOMPLETE BLADDER EMPTYING: Status: ACTIVE | Noted: 2020-10-05

## 2022-03-19 PROBLEM — E78.5 HYPERLIPIDEMIA ASSOCIATED WITH TYPE 2 DIABETES MELLITUS (HCC): Status: ACTIVE | Noted: 2020-10-07

## 2022-03-19 PROBLEM — J30.89 NON-SEASONAL ALLERGIC RHINITIS: Status: ACTIVE | Noted: 2020-11-03

## 2022-03-19 PROBLEM — I87.2 VENOUS (PERIPHERAL) INSUFFICIENCY: Status: ACTIVE | Noted: 2021-10-21

## 2022-03-20 PROBLEM — N40.1 BENIGN PROSTATIC HYPERPLASIA WITH LOWER URINARY TRACT SYMPTOMS: Status: ACTIVE | Noted: 2020-10-05

## 2022-05-04 ENCOUNTER — OFFICE VISIT (OUTPATIENT)
Dept: FAMILY MEDICINE CLINIC | Age: 67
End: 2022-05-04
Payer: MEDICARE

## 2022-05-04 VITALS
BODY MASS INDEX: 38.99 KG/M2 | OXYGEN SATURATION: 98 % | WEIGHT: 256.4 LBS | SYSTOLIC BLOOD PRESSURE: 144 MMHG | RESPIRATION RATE: 18 BRPM | HEART RATE: 82 BPM | DIASTOLIC BLOOD PRESSURE: 81 MMHG

## 2022-05-04 DIAGNOSIS — E11.65 TYPE 2 DIABETES MELLITUS WITH HYPERGLYCEMIA, WITH LONG-TERM CURRENT USE OF INSULIN (HCC): Primary | ICD-10-CM

## 2022-05-04 DIAGNOSIS — E78.5 HYPERLIPIDEMIA ASSOCIATED WITH TYPE 2 DIABETES MELLITUS (HCC): ICD-10-CM

## 2022-05-04 DIAGNOSIS — I10 ESSENTIAL HYPERTENSION: ICD-10-CM

## 2022-05-04 DIAGNOSIS — J01.90 ACUTE NON-RECURRENT SINUSITIS, UNSPECIFIED LOCATION: ICD-10-CM

## 2022-05-04 DIAGNOSIS — E11.69 HYPERLIPIDEMIA ASSOCIATED WITH TYPE 2 DIABETES MELLITUS (HCC): ICD-10-CM

## 2022-05-04 DIAGNOSIS — R41.3 MEMORY LOSS: ICD-10-CM

## 2022-05-04 DIAGNOSIS — Z79.4 TYPE 2 DIABETES MELLITUS WITH HYPERGLYCEMIA, WITH LONG-TERM CURRENT USE OF INSULIN (HCC): Primary | ICD-10-CM

## 2022-05-04 LAB — HBA1C MFR BLD HPLC: 8.3 %

## 2022-05-04 PROCEDURE — 3017F COLORECTAL CA SCREEN DOC REV: CPT | Performed by: FAMILY MEDICINE

## 2022-05-04 PROCEDURE — G8417 CALC BMI ABV UP PARAM F/U: HCPCS | Performed by: FAMILY MEDICINE

## 2022-05-04 PROCEDURE — G8432 DEP SCR NOT DOC, RNG: HCPCS | Performed by: FAMILY MEDICINE

## 2022-05-04 PROCEDURE — G8753 SYS BP > OR = 140: HCPCS | Performed by: FAMILY MEDICINE

## 2022-05-04 PROCEDURE — G8427 DOCREV CUR MEDS BY ELIG CLIN: HCPCS | Performed by: FAMILY MEDICINE

## 2022-05-04 PROCEDURE — 3052F HG A1C>EQUAL 8.0%<EQUAL 9.0%: CPT | Performed by: FAMILY MEDICINE

## 2022-05-04 PROCEDURE — 1101F PT FALLS ASSESS-DOCD LE1/YR: CPT | Performed by: FAMILY MEDICINE

## 2022-05-04 PROCEDURE — 2022F DILAT RTA XM EVC RTNOPTHY: CPT | Performed by: FAMILY MEDICINE

## 2022-05-04 PROCEDURE — G8754 DIAS BP LESS 90: HCPCS | Performed by: FAMILY MEDICINE

## 2022-05-04 PROCEDURE — G8536 NO DOC ELDER MAL SCRN: HCPCS | Performed by: FAMILY MEDICINE

## 2022-05-04 PROCEDURE — 83036 HEMOGLOBIN GLYCOSYLATED A1C: CPT | Performed by: FAMILY MEDICINE

## 2022-05-04 PROCEDURE — 99215 OFFICE O/P EST HI 40 MIN: CPT | Performed by: FAMILY MEDICINE

## 2022-05-04 RX ORDER — AZITHROMYCIN 250 MG/1
TABLET, FILM COATED ORAL
Qty: 6 TABLET | Refills: 0 | Status: SHIPPED | OUTPATIENT
Start: 2022-05-04 | End: 2022-09-06 | Stop reason: ALTCHOICE

## 2022-05-04 RX ORDER — SYRING-NEEDL,DISP,INSUL,0.3 ML 31GX15/64"
SYRINGE, EMPTY DISPOSABLE MISCELLANEOUS
COMMUNITY
Start: 2022-04-29

## 2022-05-04 RX ORDER — FLASH GLUCOSE SENSOR
KIT MISCELLANEOUS
COMMUNITY
Start: 2022-04-12

## 2022-05-04 RX ORDER — IRBESARTAN 300 MG/1
TABLET ORAL
COMMUNITY
Start: 2022-04-13

## 2022-05-04 NOTE — PROGRESS NOTES
Nhung Esquivel presents today for   Chief Complaint   Patient presents with    Diabetes    Hypertension    Cholesterol Problem    Gout    Other     patient complains of pressure in nose and eyes running. He belives it is due to sinuses. Is someone accompanying this pt? Yes, wife    Is the patient using any DME equipment during [address]? No    Depression Screening:  3 most recent PHQ Screens 10/21/2021   Little interest or pleasure in doing things Not at all   Feeling down, depressed, irritable, or hopeless Not at all   Total Score PHQ 2 0       Learning Assessment:  Learning Assessment 9/14/2021   PRIMARY LEARNER Patient   HIGHEST LEVEL OF EDUCATION - PRIMARY LEARNER  -   BARRIERS PRIMARY LEARNER -     -   [address] -    NEED -   LEARNER PREFERENCE PRIMARY DEMONSTRATION     -   LEARNER [address] -   ANSWERED BY Patient   RELATIONSHIP SELF       Abuse Screening:  Abuse Screening Questionnaire 5/6/2021   Do you ever feel afraid of your partner? N   Are you in a relationship with someone who physically or mentally threatens you? N   Is it safe for you to go home? Y       Fall Screening  Fall Risk Assessment, last 12 mths 10/21/2021   Able to walk? Yes   Fall in past 12 months? 0   Do you feel unsteady? 0   Are you worried about falling 0       Generalized Anxiety  No flowsheet data found. Health Maintenance Due   Topic Date Due    Hepatitis C Screening  Never done    Pneumococcal 65+ years (2 - PPSV23 or PCV20) 11/04/2021    Lipid Screen  11/17/2021    MICROALBUMIN Q1  11/18/2021    A1C test (Diabetic or Prediabetic)  12/24/2021    Medicare Yearly Exam  01/07/2022    Foot Exam Q1  05/19/2022   . Health Maintenance reviewed and discussed and ordered per Provider.   Vaccines Due   Screenings Due Javier Dhaliwal is updated on all HM    1. \"Have you been to the ER, urgent care clinic since your last visit? Hospitalized since your last visit? \" No    2. \"Have you seen or consulted any other health care providers outside of the [address] [place] [address] since your last visit? \" No     3. For patients aged 39-70: Has the patient had a colonoscopy / FIT/ Cologuard?  Yes - no Care Gap present

## 2022-05-04 NOTE — PROGRESS NOTES
Chief Complaint   Patient presents with    Diabetes    Hypertension    Cholesterol Problem    Gout    Other     patient complains of pressure in nose and eyes running. He belives it is due to sinuses. HPI    Davida Inman is a 79 y.o. male presenting today for 3 months  follow up of dm, htn, hld, gout. Pt cont to f/u with endo for his dm. Home bs readings are usually good; variances tend to be diet related. No recent labs. Patient does not need medication refills today. New concerns today: pt c/o recent increased pressure in nose as well as runny eyes. He is taking the singulair and using his nose spray. Pt also c/o short term memory loss. Review of Systems   Constitutional: Negative. HENT: Positive for congestion and sinus pain. Respiratory: Negative. Cardiovascular: Negative. Psychiatric/Behavioral: Positive for memory loss. All other systems reviewed and are negative. Physical Exam  Vitals and nursing note reviewed. Constitutional:       General: He is not in acute distress. Appearance: Normal appearance. HENT:      Head: Normocephalic and atraumatic. Right Ear: Tympanic membrane, ear canal and external ear normal. Tympanic membrane is not erythematous. Left Ear: Tympanic membrane, ear canal and external ear normal. Tympanic membrane is not erythematous. Nose:      Right Turbinates: Swollen. Left Turbinates: Swollen. Right Sinus: Maxillary sinus tenderness and frontal sinus tenderness present. Left Sinus: Frontal sinus tenderness present. Eyes:      Extraocular Movements: Extraocular movements intact. Conjunctiva/sclera: Conjunctivae normal.   Cardiovascular:      Rate and Rhythm: Normal rate and regular rhythm. Heart sounds: No murmur heard. No friction rub. No gallop. Pulmonary:      Effort: Pulmonary effort is normal.      Breath sounds: Normal breath sounds. No wheezing, rhonchi or rales. Musculoskeletal:         General: Normal range of motion. Cervical back: Normal range of motion. No rigidity. Skin:     General: Skin is warm and dry. Neurological:      Mental Status: He is alert and oriented to person, place, and time. Coordination: Coordination normal.   Psychiatric:         Mood and Affect: Mood normal.         Behavior: Behavior normal.         Thought Content: Thought content normal.         Judgment: Judgment normal.       Recent Results (from the past 12 hour(s))   AMB POC HEMOGLOBIN A1C    Collection Time: 05/04/22  1:09 PM   Result Value Ref Range    Hemoglobin A1c (POC) 8.3 %         Diagnoses and all orders for this visit:    1. Type 2 diabetes mellitus with hyperglycemia, with long-term current use of insulin ([facility])  -     AMB POC HEMOGLOBIN A1C    2. Essential hypertension  Stable, cont pres tx plan. 3. Hyperlipidemia associated with type 2 diabetes mellitus ([facility]    4. Memory loss  -     REFERRAL TO NEUROPSYCHOLOGY    5. Acute non-recurrent sinusitis, unspecified location  -     azithromycin (ZITHROMAX) 250 mg tablet; Take 2 pills today, then one pill daily on days 2-5. Follow-up and Dispositions    · Return in about 3 months (around 8/4/2022) for diabetes, high blood pressure, high cholesterol.

## 2022-05-13 ENCOUNTER — PATIENT MESSAGE (OUTPATIENT)
Dept: NEUROLOGY | Age: 67
End: 2022-05-13

## 2022-05-19 DIAGNOSIS — M79.671 RIGHT FOOT PAIN: ICD-10-CM

## 2022-05-19 RX ORDER — IBUPROFEN 800 MG/1
TABLET ORAL
Qty: 90 TABLET | Refills: 3 | Status: SHIPPED | OUTPATIENT
Start: 2022-05-19

## 2022-05-19 NOTE — TELEPHONE ENCOUNTER
Patient need a medication refill.  Please advise     Requested Prescriptions     Pending Prescriptions Disp Refills    ibuprofen (IBU) 800 mg tablet 90 Tablet 3

## 2022-06-10 DIAGNOSIS — K21.9 GASTROESOPHAGEAL REFLUX DISEASE, UNSPECIFIED WHETHER ESOPHAGITIS PRESENT: ICD-10-CM

## 2022-06-10 DIAGNOSIS — J30.89 NON-SEASONAL ALLERGIC RHINITIS, UNSPECIFIED TRIGGER: ICD-10-CM

## 2022-06-10 RX ORDER — LEVOCETIRIZINE DIHYDROCHLORIDE 5 MG/1
5 TABLET, FILM COATED ORAL DAILY
Qty: 30 TABLET | Refills: 5 | Status: SHIPPED | OUTPATIENT
Start: 2022-06-10

## 2022-06-10 RX ORDER — MONTELUKAST SODIUM 10 MG/1
10 TABLET ORAL DAILY
Qty: 30 TABLET | Refills: 5 | Status: SHIPPED | OUTPATIENT
Start: 2022-06-10

## 2022-06-10 RX ORDER — OMEPRAZOLE 40 MG/1
40 CAPSULE, DELAYED RELEASE ORAL DAILY
Qty: 30 CAPSULE | Refills: 5 | Status: SHIPPED | OUTPATIENT
Start: 2022-06-10

## 2022-06-10 NOTE — TELEPHONE ENCOUNTER
Requested Prescriptions     Pending Prescriptions Disp Refills    montelukast (SINGULAIR) 10 mg tablet 30 Tablet 5     Sig: Take 1 Tablet by mouth daily.  levocetirizine (XYZAL) 5 mg tablet 30 Tablet 5     Sig: Take 1 Tablet by mouth daily.  omeprazole (PRILOSEC) 40 mg capsule 30 Capsule 5     Sig: Take 1 Capsule by mouth daily.

## 2022-06-15 ENCOUNTER — OFFICE VISIT (OUTPATIENT)
Dept: NEUROLOGY | Age: 67
End: 2022-06-15
Payer: MEDICARE

## 2022-06-15 DIAGNOSIS — F41.8 ANXIETY ABOUT HEALTH: ICD-10-CM

## 2022-06-15 DIAGNOSIS — R41.3 MEMORY LOSS: Primary | ICD-10-CM

## 2022-06-15 PROCEDURE — G8536 NO DOC ELDER MAL SCRN: HCPCS | Performed by: PSYCHOLOGIST

## 2022-06-15 PROCEDURE — 90791 PSYCH DIAGNOSTIC EVALUATION: CPT | Performed by: PSYCHOLOGIST

## 2022-06-15 PROCEDURE — G8432 DEP SCR NOT DOC, RNG: HCPCS | Performed by: PSYCHOLOGIST

## 2022-06-15 PROCEDURE — G8417 CALC BMI ABV UP PARAM F/U: HCPCS | Performed by: PSYCHOLOGIST

## 2022-06-15 PROCEDURE — 1123F ACP DISCUSS/DSCN MKR DOCD: CPT | Performed by: PSYCHOLOGIST

## 2022-06-15 PROCEDURE — G8427 DOCREV CUR MEDS BY ELIG CLIN: HCPCS | Performed by: PSYCHOLOGIST

## 2022-06-15 NOTE — PROGRESS NOTES
[facility]  Neuroscience   [address] [place] [address]. [facility], [address]  Office:  [phone]  Fax: [phone]                  Initial Office Exam  Patient Name: Rohan Sampson  Age: 79 y.o. Gender: male   Handedness: right handed   Presenting Concern: memory loss  Primary Care Physician: Darian Hidalgo MD  Referring Provider: Darian Hidalgo MD      REASON FOR REFERRAL:  This comprehensive and medically necessary neuropsychological assessment was requested to assist a differential diagnosis of memory complaints. The use and purpose of this examination, as well as the extent and limitations of confidentiality, were explained prior to obtaining permission to participate. Instructions were provided regarding the necessity to put forth optimal effort and answer questions truthfully in order to obtain reliable and accurate test results. REVIEW OF RECORDS:  Ms. Amadeo Brown was referred by family medicine for a workup of short term memory loss. Legal blindness is noted. No neuroimaging was seen in records. Current Outpatient Medications   Medication Sig    montelukast (SINGULAIR) 10 mg tablet Take 1 Tablet by mouth daily.  levocetirizine (XYZAL) 5 mg tablet Take 1 Tablet by mouth daily.  omeprazole (PRILOSEC) 40 mg capsule Take 1 Capsule by mouth daily.  ibuprofen (IBU) 800 mg tablet TAKE 1 TABLET EVERY 8 HOURS AS NEEDED FOR PAIN    FreeStyle José 14 Day Sensor kit     irbesartan (AVAPRO) 300 mg tablet     BD Veo Insulin Syringe UF 1/2 mL 31 gauge x 15/64\" syrg     azithromycin (ZITHROMAX) 250 mg tablet Take 2 pills today, then one pill daily on days 2-5.  tamsulosin (FLOMAX) 0.4 mg capsule Take 2 Capsules by mouth daily.  ascorbic acid, vitamin C, (Vitamin C) 500 mg tablet Take 500 mg by mouth.  pravastatin (PRAVACHOL) 20 mg tablet Take 1 Tablet by mouth nightly.  allopurinoL (ZYLOPRIM) 100 mg tablet Take 1 Tablet by mouth daily.     doxycycline (VIBRAMYCIN) 100 mg capsule Take 1 Capsule by mouth two (2) times a day.  flunisolide (NASAREL) 25 mcg (0.025 %) spry INSTILL 2 SPRAYS INTO EACH NOSTRIL TWICE A DAY    Droplet Pen Needle 30 gauge x 5/16\" ndle     Droplet Pen Needle 31 gauge x 5/16\" ndle     Farxiga 10 mg tab tablet     indomethacin (INDOCIN) 50 mg capsule take 1 capsule by mouth twice a day    Droplet Insulin Syr,half unit, 0.5 mL 31 gauge x 5/16\" syrg     glimepiride (AMARYL) 2 mg tablet     multivit-min/folic/vit K/lycop (ONE-A-DAY MEN'S 50 PLUS PO) Take  by mouth.  cholecalciferol (VITAMIN D3) (2,000 UNITS /50 MCG) cap capsule Take 2,000 Units by mouth daily.  insulin aspart, niacinamide, (FIASP FLEXTOUCH U-100 INSULIN SC) by SubCUTAneous route. Sliding scale    TOUJEO SOLOSTAR 300 unit/mL (1.5 mL) inpn 60 Units by SubCUTAneous route nightly.  hydrochlorothiazide (HYDRODIURIL) 25 mg tablet TAKE 1 TABLET EVERY DAY    amLODIPine (NORVASC) 10 mg tablet TAKE 1 TABLET EVERY DAY    metFORMIN (GLUCOPHAGE) 1,000 mg tablet Take 1 Tab by mouth two (2) times daily (with meals). Indications: TYPE 2 DIABETES MELLITUS     No current facility-administered medications for this visit. 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 Zuleyma Ward was accompanied by his wife for his initial interview. Consistent with records, he reported difficulties with memory which first emerged approximately 6 months ago.   He reported difficulty remembering the names of people he is known for a long time.  Neurologic history is negative for seizures, stroke, syncope, and significant head trauma. Mr. Albina Castro has been legally blind since 2003 resulting from retinitis pigmentosa of both eyes. Sleep disturbance is significant for snoring. Pain complaints were denied. There is no alcohol, tobacco, or illicit substance use. Family history of neurologic illness was denied. With regard to emotional functioning, Mr. Albina Castro denied a psychiatric history. He also denied a history of psychiatric hospitalization, suicidal ideation, self-harm behaviors, psychotic symptoms, and psychological trauma. He denied a family history of psychiatric illness. Socially, Mr. Albina Castro has been  for 46 years. He has 3 adult children with whom he maintains a good relationship. He resides with his wife and enjoys going to [employer] weekly and listening to music. Friend and family support were described as good. Mr. Albina Castro has not been exercising through the pandemic. He was previously active at the [facility]. Academically, Mr. Alibna Castro completed 11 years of education. When asked why he left school early he stated, \"I got hung up\". He denied a history of learning problems. Vocationally, Mr. Madison Tobar went on disability status due to RP in 2003. 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 Mr. Lorena Estrada is his own payee. Functionally, Mr. Albina Castro receives assistance from his wife for medication management and bill payment due to his visual impairments. Other ADLs and IADLs requires assistance due to RP. He does not drive.     MENTAL STATUS:    Sensorium  Awake, Aware, Alert   Orientation person, place, time/date, situation, day of week, month of year and year   Relations cooperative   Eye Contact appropriate   Appearance:  age appropriate   Motor Behavior:  Legally blind; guided by spouse   Speech:  normal pitch and normal volume   Vocabulary average   Thought Process: within normal limits   Thought Content free of delusions and free of hallucinations   Suicidal ideations none   Homicidal ideations none   Mood:  euthymic   Affect:  mood-congruent   Memory recent  adequate   Memory remote:  adequate   Concentration:  adequate   Abstraction:  abstract   Insight:  fair   Reliability fair   Judgment:  fair         DIAGNOSTIC IMPRESSIONS:  1. Cognitive Decline: R/O Mild Neurocognitive Disorder  2. Anxiety about health      PLAN:  1. Complete a comprehensive neuropsychological assessment to provide a differential diagnosis of presenting concerns as well as to assist with disposition and treatment planning as appropriate. 2. Consider compensatory and remedial cognitive training. 3. Consider nonpharmacological interventions for mood disorder. 14877 x 1 Review of records. Face to face interview w/ patient. Determine test protocol: 60 minutes. Total 1 unit      Miguel Agudelo, PHD  Licensed Clinical Psychologist    This note was created using voice recognition software. Despite editing, there may be syntax errors.

## 2022-08-23 ENCOUNTER — OFFICE VISIT (OUTPATIENT)
Dept: NEUROLOGY | Age: 67
End: 2022-08-23
Payer: MEDICARE

## 2022-08-23 DIAGNOSIS — F41.8 ANXIETY ABOUT HEALTH: ICD-10-CM

## 2022-08-23 DIAGNOSIS — G31.84 MILD NEUROCOGNITIVE DISORDER: Primary | ICD-10-CM

## 2022-08-23 DIAGNOSIS — H54.8 LEGALLY BLIND: ICD-10-CM

## 2022-08-23 PROCEDURE — 96132 NRPSYC TST EVAL PHYS/QHP 1ST: CPT | Performed by: PSYCHOLOGIST

## 2022-08-23 PROCEDURE — 96136 PSYCL/NRPSYC TST PHY/QHP 1ST: CPT | Performed by: PSYCHOLOGIST

## 2022-08-23 PROCEDURE — 96133 NRPSYC TST EVAL PHYS/QHP EA: CPT | Performed by: PSYCHOLOGIST

## 2022-08-23 PROCEDURE — 96137 PSYCL/NRPSYC TST PHY/QHP EA: CPT | Performed by: PSYCHOLOGIST

## 2022-08-23 PROCEDURE — 96139 PSYCL/NRPSYC TST TECH EA: CPT | Performed by: PSYCHOLOGIST

## 2022-08-23 PROCEDURE — 96138 PSYCL/NRPSYC TECH 1ST: CPT | Performed by: PSYCHOLOGIST

## 2022-08-23 PROCEDURE — G8432 DEP SCR NOT DOC, RNG: HCPCS | Performed by: PSYCHOLOGIST

## 2022-08-24 NOTE — PROGRESS NOTES
[facility]  Neuroscience   [address] [place] [address]. [facility], [address]  Office:  [phone]  Fax: [phone]                Neuropsychological Evaluation Report    Patient Name: Ronen Vega  Age: 79 y.o. Gender: male   Handedness: right handed   Presenting Concern: memory loss  Primary Care Physician: Lazara Myrick MD  Referring Provider: Lazara Myrick MD    PATIENT HISTORY (OBTAINED DURING INITIAL CLINICAL EVALUATION):    REASON FOR REFERRAL:  This comprehensive and medically necessary neuropsychological assessment was requested to assist a differential diagnosis of memory complaints. The use and purpose of this examination, as well as the extent and limitations of confidentiality, were explained prior to obtaining permission to participate. Instructions were provided regarding the necessity to put forth optimal effort and answer questions truthfully in order to obtain reliable and accurate test results. REVIEW OF RECORDS:  Ms. Christine Gee was referred by family medicine for a workup of short term memory loss. Legal blindness is noted. No neuroimaging was seen in records. Current Outpatient Medications   Medication Sig    montelukast (SINGULAIR) 10 mg tablet Take 1 Tablet by mouth daily. levocetirizine (XYZAL) 5 mg tablet Take 1 Tablet by mouth daily. omeprazole (PRILOSEC) 40 mg capsule Take 1 Capsule by mouth daily. ibuprofen (IBU) 800 mg tablet TAKE 1 TABLET EVERY 8 HOURS AS NEEDED FOR PAIN    FreeStyle José 14 Day Sensor kit     irbesartan (AVAPRO) 300 mg tablet     BD Veo Insulin Syringe UF 1/2 mL 31 gauge x 15/64\" syrg     azithromycin (ZITHROMAX) 250 mg tablet Take 2 pills today, then one pill daily on days 2-5.    tamsulosin (FLOMAX) 0.4 mg capsule Take 2 Capsules by mouth daily. ascorbic acid, vitamin C, (Vitamin C) 500 mg tablet Take 500 mg by mouth.    pravastatin (PRAVACHOL) 20 mg tablet Take 1 Tablet by mouth nightly. allopurinoL (ZYLOPRIM) 100 mg tablet Take 1 Tablet by mouth daily. doxycycline (VIBRAMYCIN) 100 mg capsule Take 1 Capsule by mouth two (2) times a day. flunisolide (NASAREL) 25 mcg (0.025 %) spry INSTILL 2 SPRAYS INTO EACH NOSTRIL TWICE A DAY    Droplet Pen Needle 30 gauge x 5/16\" ndle     Droplet Pen Needle 31 gauge x 5/16\" ndle     Farxiga 10 mg tab tablet     indomethacin (INDOCIN) 50 mg capsule take 1 capsule by mouth twice a day    Droplet Insulin Syr,half unit, 0.5 mL 31 gauge x 5/16\" syrg     glimepiride (AMARYL) 2 mg tablet     multivit-min/folic/vit K/lycop (ONE-A-DAY MEN'S 50 PLUS PO) Take  by mouth. cholecalciferol (VITAMIN D3) (2,000 UNITS /50 MCG) cap capsule Take 2,000 Units by mouth daily. insulin aspart, niacinamide, (FIASP FLEXTOUCH U-100 INSULIN SC) by SubCUTAneous route. Sliding scale    TOUJEO SOLOSTAR 300 unit/mL (1.5 mL) inpn 60 Units by SubCUTAneous route nightly. hydrochlorothiazide (HYDRODIURIL) 25 mg tablet TAKE 1 TABLET EVERY DAY    amLODIPine (NORVASC) 10 mg tablet TAKE 1 TABLET EVERY DAY    metFORMIN (GLUCOPHAGE) 1,000 mg tablet Take 1 Tab by mouth two (2) times daily (with meals). Indications: TYPE 2 DIABETES MELLITUS     No current facility-administered medications for this visit. 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 Carola Carbone was accompanied by his wife for his initial interview. Consistent with records, he reported difficulties with memory which first emerged approximately 6 months ago.   He reported difficulty remembering the names of people he has known for a long time. Neurologic history is negative for seizures, stroke, syncope, and significant head trauma. Mr. Steve Orourke has been legally blind since 2003 resulting from retinitis pigmentosa of both eyes. Sleep disturbance is significant for snoring. Pain complaints were denied. There is no alcohol, tobacco, or illicit substance use. Family history of neurologic illness was denied. With regard to emotional functioning, Mr. Steve Orourke denied a psychiatric history. He also denied a history of psychiatric hospitalization, suicidal ideation, self-harm behaviors, psychotic symptoms, and psychological trauma. He denied a family history of psychiatric illness. Socially, Mr. Steve Orourke has been  for 46 years. He has three adult children with whom he maintains a good relationship. He resides with his wife and enjoys going to [employer] weekly and listening to music. Friend and family support were described as good. Mr. Steve Orourke has not been exercising through the pandemic. He was previously active at the [facility]. Academically, Mr. Steve Orourke completed 11 years of education. When asked why he left school early he stated, \"I got hung up\". He denied a history of learning problems. Vocationally, Mr. Natalie Yanes went on disability status due to RP in 2003. 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 Mr. Steve Orourke is his own payee. Functionally, Mr. Steve Orourke receives assistance from his wife for medication management and bill payment due to his visual impairments. Other ADLs and IADLs require assistance due to RP. He does not drive.     MENTAL STATUS:    Sensorium  Awake, Aware, Alert   Orientation person, place, time/date, situation, day of week, month of year and year   Relations cooperative   Eye Contact appropriate   Appearance:  age appropriate   Motor Behavior:  Legally blind; guided by spouse   Speech:  normal pitch and normal volume Vocabulary average   Thought Process: within normal limits   Thought Content free of delusions and free of hallucinations   Suicidal ideations none   Homicidal ideations none   Mood:  euthymic   Affect:  mood-congruent   Memory recent  adequate   Memory remote:  adequate   Concentration:  adequate   Abstraction:  abstract   Insight:  fair   Reliability fair   Judgment:  fair     METHODS OF ASSESSMENT (Current Evaluation):  Clinician Administered: Adaptive Behavior Assessment System (ABAS-3)  Clinical Assessment of Geriatric Emotional Status (CASE)  Modified Mini-Mental Status Exam (3MS)  Mood Assessment Scale    Technician Administered Hola Marlo):  Category Fluency for Animals  Controlled Oral Word Association Test  Hand Dynamometer Test  Neuropsychological Assessment Battery-Select Subtest  Oral Trailmaking Test  RBANS-A-Select Subtests  RBANS-B-Select Subtests  Reliable Digit Span  Wechsler Adult Intelligence Scale-IV-Select Subtests    TEST OBSERVATIONS:  Mr. Nati Luz arrived promptly for the testing session. Dress and grooming were appropriate; physical presentation was unchanged from that observed during the clinical interview. Speech was slurred at times. Comprehension difficulties for complex instructions were noted. No unusual behaviors or psychomotor abnormalities were observed. Thought process was logical, relevant, and focused. Thought content showed no apparent delusional ideation. Auditory and visual hallucinations were denied and there was no obvious response to internal stimuli. Affect was congruent with the euthymic mood conveyed. Mr. Nati Luz was adequately cooperative and appeared to put forth good effort throughout this examination. Rapport with the examiner was adequately established and maintained. Performance motivation was objectively measured with one instrument (Reliable Digit Span); Mr. Nati Luz produced a normal score on this measure.  Accordingly, test findings below do not appear to be the product of disingenuous effort. Given the above observations, plus comments contained in the Mental Status section, the results of this examination are regarded as reasonably reliable and valid. TEST RESULTS:  Quantitative test results are derived from comparisons to age and education corrected cohort normative data, where applicable. Percentiles are included in these instances. Qualitative test results are determined using clinical observations. General Orientation and Awareness:       Orientation to person yes   Time yes  Place yes  Circumstance yes                     Sensory-Perceptual and Motor Functioning:    Visual and auditory acuity:  Legally Blind     Gait and balance:   Assisted by wife due to visual deficits                                        Classification:   strength right dominant hand (1 percentile)                      Severely Impaired   strength left nondominant hand (<1 percentile)       Severely Impaired    Cognitive Screening: On the Modified Mini-Mental Status Exam, Mr. Lorie Rojas demonstrated difficulties with the following: Abstract reasoning. Attention/Concentration:       Oral Trails (35 percentile)                     Average    On a continuous performance test, there was only one atypical score which does not suggest a disorder characterized by attention deficits.     Language:         Phonemic verbal fluency (1 percentile)                                 Severely Impaired   Categorical verbal fluency (27 percentile)                  Average    Memory and Learning:       Immediate word list learning (4 percentile)                  Moderately Impaired  Delayed word list recall (1 percentile)                   Severely Impaired  Delayed word list recognition (12 percentile)                  Low Average  Immediate story memory (1 percentile)                     Severely Impaired  Delayed story recall (5 percentile)                   Mildly Impaired    Cognitive Tests of Executive Functioning:     Ability to think flexibly, Trailmaking B-Oral (42 percentile)     Average  Simple judgment in daily decision making (24 percentile)                Low Average    Intellectual and Basic Cognitive Functioning (WAIS-IV)  Verbal Comprehension Index: 81  Percentile: 10      Low Average   Similarities: 10    Percentile: 50      Vocabulary: 6     Percentile: 9           Information: 4    Percentile: 2     Working Memory Index: 95  Percentile: 37      Average   Digit Span: 9    Percentile: 37      Arithmetic: 9    Percentile: 37       Adaptive Behavior (Adaptive Behavior Assessment System)  General Adaptive Composite:  66   Percentile: 1  Severely Impaired   Conceptual:  66    Percentile: 1  Severely Impaired   Social:   74    Percentile: 4  Moderately Impaired   Practical:  64    Percentile: 1  Severely Impaired          Emotional Functioning:  Screening of Emotional/Psychiatric Status:  Level of self-reported depression   (5/30)     Normal    On the Clinical Assessment Scale for the Elderly completed by Mr. Angi Hankins wife, she reported observing the following: Mild anxiety and irritability. IMPRESSIONS/RECOMMENDATIONS:  Comprehensive testing was limited by Mr. Angi Hankins visual deficits. That said, difficulties were noted in the following areas:  strength, phonemic fluency, list learning and recall (recognition in tact), and story memory. Taken with the lack of neuroimaging, limited test data, and the sleep disturbance reported by Mr. Ish Torres, I believe the most conservative diagnosis at this time is Mild Neurocognitive Disorder. Serial testing in 12 months will be beneficial for tracking the trajectory of cognitive symptoms and to further assess differential diagnosis.   In the interim, I would pharmacotherapy if not contraindicated, recommend Speech Therapy for cognitive rehabilitation adapted for Mr. Angi Hankins visual impairments, Occupational Therapy for the aforementioned motor deficits, neuroimaging to obtain correlates, a physician-approved level of exercise, management of vascular risk factors, and a sleep study. With regard to the latter, Mr. Reggie Vizcarra reported snoring which could indicate the presence of sleep apnea. Untreated sleep apnea would be expected to exacerbate cognitive symptoms. Is reassuring that Mr. Reggie Vizcarra receives support for ADL and IADL care. To further bolster overall functioning, the general recommendations below are suggested. He might also wish to explore assisted technology for legal blindness. More information can be found at: "[employer].pt. DIAGNOSTIC IMPRESSIONS:  Mild Neurocognitive Disorder  Legally Blind  Anxiety about health    GENERAL RECOMMENDATIONS:   Studies have shown that a program of lifestyle intervention consisting of diet, exercise, brain training, mental stimulation, and management of cardiovascular risk factors can reduce cognitive decline and prolong the persons independence. Exercise: Exercise can improve your thinking and ability to focus. Activities such as gardening, caring for pets, or walking, can help improve your attention and concentration levels. Meditation: Meditation can help improve brain function by increasing your focus and awareness. Day-to-day coping  Use a detailed daily planner, notebooks, reminder notes, or your smart phone. Kiana Watson Keeping everything in one place makes it easier to find the reminders you may need. You might want to keep track of appointments and schedules, to do lists, important dates, websites, phone numbers and addresses, meeting notes, and even movies youd like to see or books youd like to read. Do the most demanding tasks at the time of they day when you feel your energy levels are the highest.  Exercise your brain. Take a class or learn a new language. Get enough rest and sleep. Keep moving.  Regular physical activity is not only good for your body, but also improves your mood, makes you feel more alert, and decreases tiredness (fatigue). Eat veggies. Studies have shown that eating more vegetables is linked to keeping brain power as people age. Set up and follow routines. Try to keep the same daily schedule. Pick a certain place for commonly lost objects and put them there each time. Try not to multi-task. Focus on one thing at a time. Avoid alcohol and other agents that might change your mental state and sleeping patterns  Ask for help when you need it. Friends and loved ones can help with daily tasks to cut down on distractions and help you save mental energy. Track your memory problems. Keep a diary of when you notice problems and whats going on at the time. Medicines taken, time of day, and the situation youre in might help you figure out what affects your memory. Keeping track of when the problems are most noticeable can also help you prepare. Jesús Hyde know to avoid planning important conversations or appointments during those times. This record will also be useful when you talk with your doctor about these problems. Thank you for allowing me the opportunity to assist you in Mr. Linda Bell care. Please do not hesitate to contact me should you have additional questions that I may not have addressed. 30934 x 1  96137 x 1  96138 x 1  96139 x 4  96132 x 1  96133 x 1    [facility] Natasha Sahu, Ph.D.   Licensed Clinical Psychologist        Time Documentation:     64197 x 1   76069 x 1 Neuropsych testing/data gathering by Neuropsychologist: (1 hour). 66808 x1 (first 30 minutes), 96137 x 1 (additional 30 minutes)     96138 x 1  96139 x 4 Test Administration/Data Gathering By Technician: (2.5 hours).  08391 x 1 (first 30 minutes), 96139 x 4 (each additional 30 minutes)     96132 x 1  96133 x 1 Testing Evaluation Services by Neuropsychologist (1 hour, 50 minutes), 58018 x1 (first hour), 50878 x1 (additional 50 minutes)     The above includes: Record review. Review of history provided by patient. Review of collaborative information. Testing by Clinician. Review of raw data. Scoring. Report writing of individual tests administered by Clinician. Integration of individual tests administered by psychometrist with NSE/testing by clinician, review of records/history/collaborative information, case Conceptualization, treatment planning, clinical decision making, report writing, coordination Of Care. Psychometry test codes as time spent by psychometrist administering and scoring neurocognitive/psychological tests under supervision of neuropsychologist.  Integral services including scoring of raw data, data interpretation, case conceptualization, report writing etcetera were initiated after the patient finished testing/raw data collected and was completed on the date the report was signed. This note will not be viewable in [address] This note was created using voice recognition software. Despite editing, there may be syntax errors.

## 2022-09-06 ENCOUNTER — OFFICE VISIT (OUTPATIENT)
Dept: FAMILY MEDICINE CLINIC | Age: 67
End: 2022-09-06
Payer: MEDICARE

## 2022-09-06 VITALS
SYSTOLIC BLOOD PRESSURE: 139 MMHG | WEIGHT: 250.8 LBS | OXYGEN SATURATION: 96 % | DIASTOLIC BLOOD PRESSURE: 75 MMHG | HEART RATE: 87 BPM | TEMPERATURE: 98.7 F | RESPIRATION RATE: 20 BRPM | HEIGHT: 68 IN | BODY MASS INDEX: 38.01 KG/M2

## 2022-09-06 DIAGNOSIS — E78.5 HYPERLIPIDEMIA ASSOCIATED WITH TYPE 2 DIABETES MELLITUS (HCC): ICD-10-CM

## 2022-09-06 DIAGNOSIS — I10 ESSENTIAL HYPERTENSION: ICD-10-CM

## 2022-09-06 DIAGNOSIS — E11.69 HYPERLIPIDEMIA ASSOCIATED WITH TYPE 2 DIABETES MELLITUS (HCC): ICD-10-CM

## 2022-09-06 DIAGNOSIS — E11.65 TYPE 2 DIABETES MELLITUS WITH HYPERGLYCEMIA, WITH LONG-TERM CURRENT USE OF INSULIN (HCC): Primary | ICD-10-CM

## 2022-09-06 DIAGNOSIS — Z23 ENCOUNTER FOR IMMUNIZATION: ICD-10-CM

## 2022-09-06 DIAGNOSIS — Z79.4 TYPE 2 DIABETES MELLITUS WITH HYPERGLYCEMIA, WITH LONG-TERM CURRENT USE OF INSULIN (HCC): Primary | ICD-10-CM

## 2022-09-06 DIAGNOSIS — G31.84 MILD NEUROCOGNITIVE DISORDER: ICD-10-CM

## 2022-09-06 PROCEDURE — 1101F PT FALLS ASSESS-DOCD LE1/YR: CPT | Performed by: FAMILY MEDICINE

## 2022-09-06 PROCEDURE — 2022F DILAT RTA XM EVC RTNOPTHY: CPT | Performed by: FAMILY MEDICINE

## 2022-09-06 PROCEDURE — 90694 VACC AIIV4 NO PRSRV 0.5ML IM: CPT | Performed by: FAMILY MEDICINE

## 2022-09-06 PROCEDURE — 1123F ACP DISCUSS/DSCN MKR DOCD: CPT | Performed by: FAMILY MEDICINE

## 2022-09-06 PROCEDURE — 3052F HG A1C>EQUAL 8.0%<EQUAL 9.0%: CPT | Performed by: FAMILY MEDICINE

## 2022-09-06 PROCEDURE — G0008 ADMIN INFLUENZA VIRUS VAC: HCPCS | Performed by: FAMILY MEDICINE

## 2022-09-06 PROCEDURE — G8752 SYS BP LESS 140: HCPCS | Performed by: FAMILY MEDICINE

## 2022-09-06 PROCEDURE — G8510 SCR DEP NEG, NO PLAN REQD: HCPCS | Performed by: FAMILY MEDICINE

## 2022-09-06 PROCEDURE — G8536 NO DOC ELDER MAL SCRN: HCPCS | Performed by: FAMILY MEDICINE

## 2022-09-06 PROCEDURE — G8417 CALC BMI ABV UP PARAM F/U: HCPCS | Performed by: FAMILY MEDICINE

## 2022-09-06 PROCEDURE — 3017F COLORECTAL CA SCREEN DOC REV: CPT | Performed by: FAMILY MEDICINE

## 2022-09-06 PROCEDURE — 99214 OFFICE O/P EST MOD 30 MIN: CPT | Performed by: FAMILY MEDICINE

## 2022-09-06 PROCEDURE — G8754 DIAS BP LESS 90: HCPCS | Performed by: FAMILY MEDICINE

## 2022-09-06 PROCEDURE — G8427 DOCREV CUR MEDS BY ELIG CLIN: HCPCS | Performed by: FAMILY MEDICINE

## 2022-09-06 NOTE — PROGRESS NOTES
Chief Complaint   Patient presents with    Diabetes    Hypertension    Cholesterol Problem     High chol         HPI    Yu Sanches is a 79 y.o. male presenting today for 4 months  follow up of dm, htn, hld. Pt reports that his blood sugars have been better; [facility] His sugars are low upon awakening in the am.  It was 56 this am.  He has not been eating prior to exercise and notes his bs will go higher by the time he is done 60-90 min later. Pt was seen by neuropsych and dx with mild neurocognitive d/o. Patient does not need medication refills today. New concerns today: Pt would like to have a flu shot today. Review of Systems   Constitutional: Negative. HENT: Negative. Respiratory: Negative. Cardiovascular: Negative. All other systems reviewed and are negative. Physical Exam  Vitals and nursing note reviewed. Constitutional:       General: He is not in acute distress. Appearance: Normal appearance. HENT:      Head: Normocephalic and atraumatic. Right Ear: External ear normal.      Left Ear: External ear normal.      Nose: Nose normal.   Eyes:      Extraocular Movements: Extraocular movements intact. Conjunctiva/sclera: Conjunctivae normal.   Cardiovascular:      Rate and Rhythm: Normal rate and regular rhythm. Heart sounds: No murmur heard. No friction rub. No gallop. Pulmonary:      Effort: Pulmonary effort is normal.      Breath sounds: Normal breath sounds. No wheezing, rhonchi or rales. Musculoskeletal:         General: Normal range of motion. Cervical back: Normal range of motion. No rigidity. Skin:     General: Skin is warm and dry. Neurological:      Mental Status: He is alert and oriented to person, place, and time. Coordination: Coordination normal.   Psychiatric:         Mood and Affect: Mood normal.         Behavior: Behavior normal.         Thought Content:  Thought content normal.         Judgment: Judgment normal.       Diagnoses and all orders for this visit:    1. Type 2 diabetes mellitus with hyperglycemia, with long-term current use of insulin (HCC)  -     METABOLIC PANEL, COMPREHENSIVE; Future  -     LIPID PANEL; Future  -     HEMOGLOBIN A1C WITH EAG; Future  -     MICROALBUMIN, UR, RAND W/ MICROALB/CREAT RATIO; Future  Pt and wife advised to call endo for med adjustment; he will need to d/c or decrease some of pt's meds with the exercise program and wt loss. They will call today    2. Essential hypertension  -     METABOLIC PANEL, COMPREHENSIVE; Future  -     LIPID PANEL; Future  Stable, cont pres tx plan. 3. Hyperlipidemia associated with type 2 diabetes mellitus (HCC)  -     METABOLIC PANEL, COMPREHENSIVE; Future  -     LIPID PANEL; Future    4. Mild neurocognitive disorder  F/up appt later this month with neuropsych    5. Encounter for immunization  -     INFLUENZA, FLUAD, (AGE 72 Y+), IM, PF, 0.5 ML    Follow-up and Dispositions    Return in about 3 months (around 12/6/2022) for diabetes, high blood pressure, high cholesterol.

## 2022-09-06 NOTE — PROGRESS NOTES
Rylie Cortez presents today for   Chief Complaint   Patient presents with    Diabetes    Hypertension    Cholesterol Problem     High chol       Is someone accompanying this pt? Yes, wife, Bubba Pineda    Is the patient using any DME equipment during [address]? no    Depression Screening:  3 most recent PHQ Screens 9/6/2022   Little interest or pleasure in doing things Not at all   Feeling down, depressed, irritable, or hopeless Not at all   Total Score PHQ 2 0       Learning Assessment:  Learning Assessment 9/6/2022   PRIMARY LEARNER Patient   HIGHEST LEVEL OF EDUCATION - PRIMARY LEARNER  -   BARRIERS PRIMARY LEARNER -     -   Grace Bradley -   ANSWERED BY patient   RELATIONSHIP SELF       Abuse Screening:  Abuse Screening Questionnaire 9/6/2022   Do you ever feel afraid of your partner? N   Are you in a relationship with someone who physically or mentally threatens you? N   Is it safe for you to go home? Y       Fall Screening  Fall Risk Assessment, last 12 mths 9/6/2022   Able to walk? Yes   Fall in past 12 months? 0   Do you feel unsteady? 0   Are you worried about falling 0       Generalized Anxiety  No flowsheet data found. Health Maintenance Due   Topic Date Due    Hepatitis C Screening  Never done    Pneumococcal 65+ years (2 - PPSV23 or PCV20) 11/04/2021    Lipid Screen  11/17/2021    MICROALBUMIN Q1  11/18/2021    A1C test (Diabetic or Prediabetic)  12/24/2021    Medicare Yearly Exam  01/07/2022    COVID-19 Vaccine (4 - Booster for Pfizer series) 02/07/2022    Foot Exam Q1  05/19/2022    Flu Vaccine (1) 09/01/2022   . Health Maintenance reviewed and discussed and ordered per Provider. Coordination of Care  1. Have you been to the ER, urgent care clinic since your last visit? Hospitalized since your last visit? no    2. Have you seen or consulted any other health care providers outside of the [address] [place] [address] since your last visit? Include any pap smears or colon screening.  no

## 2022-09-08 ENCOUNTER — TELEPHONE (OUTPATIENT)
Dept: FAMILY MEDICINE CLINIC | Age: 67
End: 2022-09-08

## 2022-09-08 ENCOUNTER — VIRTUAL VISIT (OUTPATIENT)
Dept: FAMILY MEDICINE CLINIC | Age: 67
End: 2022-09-08
Payer: MEDICARE

## 2022-09-08 DIAGNOSIS — Z71.89 ACP (ADVANCE CARE PLANNING): ICD-10-CM

## 2022-09-08 DIAGNOSIS — Z00.00 MEDICARE ANNUAL WELLNESS VISIT, SUBSEQUENT: Primary | ICD-10-CM

## 2022-09-08 PROCEDURE — G8756 NO BP MEASURE DOC: HCPCS | Performed by: FAMILY MEDICINE

## 2022-09-08 PROCEDURE — G8432 DEP SCR NOT DOC, RNG: HCPCS | Performed by: FAMILY MEDICINE

## 2022-09-08 PROCEDURE — 1101F PT FALLS ASSESS-DOCD LE1/YR: CPT | Performed by: FAMILY MEDICINE

## 2022-09-08 PROCEDURE — G8427 DOCREV CUR MEDS BY ELIG CLIN: HCPCS | Performed by: FAMILY MEDICINE

## 2022-09-08 PROCEDURE — 1123F ACP DISCUSS/DSCN MKR DOCD: CPT | Performed by: FAMILY MEDICINE

## 2022-09-08 PROCEDURE — 3017F COLORECTAL CA SCREEN DOC REV: CPT | Performed by: FAMILY MEDICINE

## 2022-09-08 PROCEDURE — G0439 PPPS, SUBSEQ VISIT: HCPCS | Performed by: FAMILY MEDICINE

## 2022-09-08 PROCEDURE — 99497 ADVNCD CARE PLAN 30 MIN: CPT | Performed by: FAMILY MEDICINE

## 2022-09-08 NOTE — PROGRESS NOTES
Advance Care Planning     Advance Care Planning (ACP) Physician/NP/PA Conversation      Date of Conversation: 9/8/2022  Conducted with: Patient with Decision Making Capacity    Healthcare Decision Maker:     Primary Decision Maker: Orquidea Larkin - [phone]    Secondary Decision Maker: Mary Sharp - Daughter - [phone]  Click here to complete [address] including selection of the Healthcare Decision Maker Relationship (ie \"Primary\")    Care Preferences:    Hospitalization: \"If your health worsens and it becomes clear that your chance of recovery is unlikely, what would be your preference regarding hospitalization? \"  The patient is unsure. Ventilation: \"If you were unable to breathe on your own and your chance of recovery was unlikely, what would be your preference about the use of a ventilator (breathing machine) if it was available to you? \"   The patient would desire the use of a ventilator. Resuscitation: \"In the event your heart stopped as a result of an underlying serious health condition, would you want attempts to be made to restart your heart, or would you prefer a natural death? \"   Yes, attempt to resuscitate.       Conversation Outcomes / Follow-Up Plan:   ACP in process - information provided, considering goals and options  Reviewed DNR/DNI and patient elects Full Code (Attempt Resuscitation)     Length of Voluntary ACP Conversation in minutes:  16 minutes    Ministerio Reyna MD

## 2022-09-08 NOTE — PROGRESS NOTES
This is the Subsequent Medicare Annual Wellness Exam, performed 12 months or more after the Initial AWV or the last Subsequent AWV    I have reviewed the patient's medical history in detail and updated the computerized patient record. Assessment/Plan   Education and counseling provided:  End-of-Life planning (with patient's consent)  Pneumococcal Vaccine  Dm foot exam recommended    1. Medicare annual wellness visit, subsequent  2. ACP (advance care planning)     Depression Risk Factor Screening:     3 most recent PHQ Screens 9/6/2022   Little interest or pleasure in doing things Not at all   Feeling down, depressed, irritable, or hopeless Not at all   Total Score PHQ 2 0       Alcohol & Drug Abuse Risk Screen    Do you average more than 1 drink per night or more than 7 drinks a week: no     In the past three months have you have had more than 4 drinks containing alcohol on one occasion: no           Functional Ability and Level of Safety:    Hearing: Hearing is good. Activities of Daily Living: The home contains: no safety equipment. Patient needs help with:  phone, transportation, shopping, preparing meals, laundry, housework, managing medications and managing money      Ambulation: with no difficulty     Fall Risk:  Fall Risk Assessment, last 12 mths 9/6/2022   Able to walk? Yes   Fall in past 12 months? 0   Do you feel unsteady? 0   Are you worried about falling 0      Abuse Screen:  Patient is not abused       Cognitive Screening    Has your family/caregiver stated any concerns about your memory: no    Cognitive Screening: .     Health Maintenance Due     Health Maintenance Due   Topic Date Due    Hepatitis C Screening  Never done    Pneumococcal 65+ years (2 - PPSV23 or PCV20) 11/04/2021    Lipid Screen  11/17/2021    MICROALBUMIN Q1  11/18/2021    A1C test (Diabetic or Prediabetic)  12/24/2021    Medicare Yearly Exam  01/07/2022    Foot Exam Q1  05/19/2022       Patient Care Team   Patient Care Team:  Capri Weeks MD as PCP - General (Family Medicine)  Capri Weeks MD as PCP - [facility] Provider  Mary Grace Coleman MD (Endocrinology Physician)  Samuel Hernandez MD (Pulmonary Disease)    History     Patient Active Problem List   Diagnosis Code    Essential hypertension I10    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    Legally blind H54.8    Type 2 diabetes mellitus with hyperglycemia, with long-term current use of insulin ([facility]) E11.65, Z79.4    Benign prostatic hyperplasia with lower urinary tract symptoms N40.1    Feeling of incomplete bladder emptying R39.14    Nocturia R35.1    Stranguria R30.0    Hyperlipidemia associated with type 2 diabetes mellitus ([address] E11.69, E78.5    Family history of prostate cancer Z80.42    Non-seasonal allergic rhinitis J30.89    Venous (peripheral) insufficiency I87.2    Cough R05.9    Impotence of organic origin N52.9    Other and unspecified hyperlipidemia E78.5    Proteinuria R80.9    Right knee DJD M17.11     Past Medical History:   Diagnosis Date    Diabetes ([address]     DJD (degenerative joint disease) of knee     History of impotence     Hypertension     Legally blind 2007    both eyes    Other and unspecified hyperlipidemia 3/10/2009    Proteinuria     Retinitis pigmentosa of both eyes     Right knee DJD 3/10/2009    Venous (peripheral) insufficiency 10/21/2021      Past Surgical History:   Procedure Laterality Date    HX WISDOM TEETH EXTRACTION      x4     Current Outpatient Medications   Medication Sig Dispense Refill    montelukast (SINGULAIR) 10 mg tablet Take 1 Tablet by mouth daily. 30 Tablet 5    levocetirizine (XYZAL) 5 mg tablet Take 1 Tablet by mouth daily. 30 Tablet 5    omeprazole (PRILOSEC) 40 mg capsule Take 1 Capsule by mouth daily.  30 Capsule 5    ibuprofen (IBU) 800 mg tablet TAKE 1 TABLET EVERY 8 HOURS AS NEEDED FOR PAIN 90 Tablet 3    FreeStyle José 14 Day Sensor kit       irbesartan (AVAPRO) 300 mg tablet       BD Veo Insulin Syringe UF 1/2 mL 31 gauge x 15/64\" syrg       tamsulosin (FLOMAX) 0.4 mg capsule Take 2 Capsules by mouth daily. 180 Capsule 3    ascorbic acid, vitamin C, (VITAMIN C) 500 mg tablet Take 500 mg by mouth.      pravastatin (PRAVACHOL) 20 mg tablet Take 1 Tablet by mouth nightly. 90 Tablet 4    allopurinoL (ZYLOPRIM) 100 mg tablet Take 1 Tablet by mouth daily. 30 Tablet 12    flunisolide (NASAREL) 25 mcg (0.025 %) spry INSTILL 2 SPRAYS INTO EACH NOSTRIL TWICE A DAY 25 mL 12    Droplet Pen Needle 30 gauge x 5/16\" ndle       Droplet Pen Needle 31 gauge x 5/16\" ndle       Farxiga 10 mg tab tablet       indomethacin (INDOCIN) 50 mg capsule take 1 capsule by mouth twice a day      Droplet Insulin Syr,half unit, 0.5 mL 31 gauge x 5/16\" syrg       glimepiride (AMARYL) 2 mg tablet       multivit-min/folic/vit K/lycop (ONE-A-DAY MEN'S 50 PLUS PO) Take  by mouth. cholecalciferol (VITAMIN D3) (2,000 UNITS /50 MCG) cap capsule Take 2,000 Units by mouth daily. TOUJEO SOLOSTAR 300 unit/mL (1.5 mL) inpn 60 Units by SubCUTAneous route nightly. hydrochlorothiazide (HYDRODIURIL) 25 mg tablet TAKE 1 TABLET EVERY DAY 90 Tab 3    amLODIPine (NORVASC) 10 mg tablet TAKE 1 TABLET EVERY DAY 90 Tab 3    metFORMIN (GLUCOPHAGE) 1,000 mg tablet Take 1 Tab by mouth two (2) times daily (with meals). Indications: TYPE 2 DIABETES MELLITUS 180 Tab 3     No Known Allergies    Family History   Problem Relation Age of Onset    Heart Disease Mother     Heart Failure Mother     Kidney Disease Father     Diabetes Father     Cancer Father         prostate    Diabetes Sister     Hypertension Sister     Obesity Sister     Cancer Brother 52        prostate    Prostate Cancer Paternal Uncle      Social History     Tobacco Use    Smoking status: Never    Smokeless tobacco: Never   Substance Use Topics    Alcohol use: Sherron Lockwood, was evaluated through a synchronous (real-time) audio-video encounter.  The patient (or guardian if applicable) is aware that this is a billable service, which includes applicable co-pays. This Virtual Visit was conducted with patient's (and/or legal guardian's) consent. The visit was conducted pursuant to the emergency declaration under the [address] [place] [id number], [address] [place] [address] authority and the [employer] and [employer] Act. Patient identification was verified, and a caregiver was present when appropriate. The patient was located at: Home: [address] [place] [phone]  The provider was located at:  Facility (Appt Department): [address] [place], [place] [phone]       Chapis Apodaca LPN      Signed By: Ministerio Reyna MD     September 8, 2022

## 2022-09-08 NOTE — TELEPHONE ENCOUNTER
Patient wife called and said that the patient went to see his endocrinologist and they had him to stop the glimepiride and cut his toujeo to 50 units instead of 60.

## 2022-09-08 NOTE — PATIENT INSTRUCTIONS
Medicare Wellness Visit, Male    The best way to live healthy is to have a lifestyle where you eat a well-balanced diet, exercise regularly, limit alcohol use, and quit all forms of tobacco/nicotine, if applicable. Regular preventive services are another way to keep healthy. Preventive services (vaccines, screening tests, monitoring & exams) can help personalize your care plan, which helps you manage your own care. Screening tests can find health problems at the earliest stages, when they are easiest to treat. [facility] follows the current, evidence-based guidelines published by the [facility] ([facility]) when recommending preventive services for our patients. Because we follow these guidelines, sometimes recommendations change over time as research supports it. (For example, a prostate screening blood test is no longer routinely recommended for men with no symptoms). Of course, you and your doctor may decide to screen more often for some diseases, based on your risk and co-morbidities (chronic disease you are already diagnosed with). Preventive services for you include:  - Medicare offers their members a free annual wellness visit, which is time for you and your primary care provider to discuss and plan for your preventive service needs. Take advantage of this benefit every year!  -All adults over age 72 should receive the recommended pneumonia vaccines. Current USPSTF guidelines recommend a series of two vaccines for the best pneumonia protection.   -All adults should have a flu vaccine yearly and tetanus vaccine every 10 years.  -All adults age 48 and older should receive the shingles vaccines (series of two vaccines).        -All adults age 38-68 who are overweight should have a diabetes screening test once every three years.   -Other screening tests & preventive services for persons with diabetes include: an eye exam to screen for diabetic retinopathy, a kidney function test, a foot exam, and stricter control over your cholesterol.   -Cardiovascular screening for adults with routine risk involves an electrocardiogram (ECG) at intervals determined by the provider.   -Colorectal cancer screening should be done for adults age 54-65 with no increased risk factors for colorectal cancer. There are a number of acceptable methods of screening for this type of cancer. Each test has its own benefits and drawbacks. Discuss with your provider what is most appropriate for you during your annual wellness visit. The different tests include: colonoscopy (considered the best screening method), a fecal occult blood test, a fecal DNA test, and sigmoidoscopy.  -All adults born between [facility] should be screened once for Hepatitis C.  -An Abdominal Aortic Aneurysm (AAA) Screening is recommended for men age 73-68 who has ever smoked in their lifetime.      Here is a list of your current Health Maintenance items (your personalized list of preventive services) with a due date:  Health Maintenance Due   Topic Date Due    Hepatitis C Test  Never done    Pneumococcal Vaccine (2 - PPSV23 or PCV20) 11/04/2021    Cholesterol Test   11/17/2021    Albumin Urine Test  11/18/2021    Hemoglobin A1C    12/24/2021    Annual Well Visit  01/07/2022    Diabetic Foot Care  05/19/2022

## 2022-09-09 RX ORDER — INSULIN GLARGINE 300 U/ML
50 INJECTION, SOLUTION SUBCUTANEOUS
COMMUNITY
Start: 2022-09-09

## 2022-09-14 ENCOUNTER — OFFICE VISIT (OUTPATIENT)
Dept: NEUROLOGY | Age: 67
End: 2022-09-14
Payer: MEDICARE

## 2022-09-14 DIAGNOSIS — G31.84 MILD NEUROCOGNITIVE DISORDER: Primary | ICD-10-CM

## 2022-09-14 DIAGNOSIS — H54.8 LEGALLY BLIND: ICD-10-CM

## 2022-09-14 DIAGNOSIS — F41.8 ANXIETY ABOUT HEALTH: ICD-10-CM

## 2022-09-14 PROCEDURE — G8536 NO DOC ELDER MAL SCRN: HCPCS | Performed by: PSYCHOLOGIST

## 2022-09-14 PROCEDURE — 1123F ACP DISCUSS/DSCN MKR DOCD: CPT | Performed by: PSYCHOLOGIST

## 2022-09-14 PROCEDURE — 90832 PSYTX W PT 30 MINUTES: CPT | Performed by: PSYCHOLOGIST

## 2022-09-14 PROCEDURE — G8417 CALC BMI ABV UP PARAM F/U: HCPCS | Performed by: PSYCHOLOGIST

## 2022-09-14 PROCEDURE — G8432 DEP SCR NOT DOC, RNG: HCPCS | Performed by: PSYCHOLOGIST

## 2022-09-14 PROCEDURE — G8427 DOCREV CUR MEDS BY ELIG CLIN: HCPCS | Performed by: PSYCHOLOGIST

## 2022-09-14 NOTE — PROGRESS NOTES
Interactive Psychotherapy/office feedback        Interactive office feedback session with Mr. Luna Hernandez and his wife. I reviewed the results of the recent Neuropsychological Evaluation  including the observed areas of neurocognitive strengths and weaknesses. Education was provided regarding my diagnostic impressions, and treatment plan/options were discussed. I also answered numerous questions related to the clinical findings, including the various methods to improve cognition and mood. CBT, psychoeducation, and supportive psychotherapy techniques were utilized. Mr. Luna Hernandez and his wife have re-instated their exercise regimen and the [employer]. Prior to seeing the patient I reviewed the records, including the previously completed report, the records in [place], and any updated visits from other providers since I saw the patient last.       Diagnoses:      Mild Neurocognitive Disorder  Legally Blind  Anxiety about health    The patient will follow up with the referring provider, and reported being very pleased with the services provided. Follow up with [facility] 12 months. [id number] psychotherapy 30 Minutes      This note was created using voice recognition software. Despite editing, there may be syntax errors.

## 2022-10-01 LAB
CREATININE, EXTERNAL: 1.19
HBA1C MFR BLD HPLC: 8.2 %
MICROALBUMIN UR TEST STR-MCNC: 466 MG/DL

## 2022-11-09 NOTE — PROGRESS NOTES
Rebekah Cleaning    Chief Complaint   Patient presents with    Varicose Veins     Follow up          History and Physical    79 y.o. male with diabetes, hypertension hyperlipidemia. He returns in follow up for right lower extremity edema. Patient states that his edema has improved significantly; wears compression stockings every day. Patient does have superficial venous insufficiency with  moderate right great saphenous vein insufficiency from the junction to the knee with a max reflux times of 2.9-second. He is not interested in intervention.        Past Medical History:   Diagnosis Date    Diabetes ([address]     DJD (degenerative joint disease) of knee     History of impotence     Hypertension     Legally blind 2007    both eyes    Other and unspecified hyperlipidemia 3/10/2009    Proteinuria     Retinitis pigmentosa of both eyes     Right knee DJD 3/10/2009    Venous (peripheral) insufficiency 10/21/2021     Patient Active Problem List   Diagnosis Code    Essential hypertension I10    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    Legally blind H54.8    Type 2 diabetes mellitus with hyperglycemia, with long-term current use of insulin (HCC) E11.65, Z79.4    Benign prostatic hyperplasia with lower urinary tract symptoms N40.1    Feeling of incomplete bladder emptying R39.14    Nocturia R35.1    Stranguria R30.0    Hyperlipidemia associated with type 2 diabetes mellitus (HCC) E11.69, E78.5    Family history of prostate cancer Z80.42    Non-seasonal allergic rhinitis J30.89    Venous (peripheral) insufficiency I87.2    Cough R05.9    Impotence of organic origin N52.9    Other and unspecified hyperlipidemia E78.5    Proteinuria R80.9    Right knee DJD M17.11     Past Surgical History:   Procedure Laterality Date    HX WISDOM TEETH EXTRACTION      x4     Current Outpatient Medications   Medication Sig Dispense Refill    testosterone cypionate (DEPOTESTOTERONE CYPIONATE) 200 mg/mL injection 1 mL by IntraMUSCular route every fourteen (14) days. Max Daily Amount: 200 mg. 6 mL 1    Needle, Disp, 18 G 18 gauge x 1\" ndle Use as directed 25 Pen Needle 1    Needle, Disp, 23 G 23 gauge x 1\" ndle Use as directed 25 Pen Needle 1    Syringe, Disposable, 3 mL syrg Use as directed 25 Each 1    pioglitazone (ACTOS) 30 mg tablet       Toujeo SoloStar U-300 Insulin 300 unit/mL (1.5 mL) inpn pen 50 Units by SubCUTAneous route nightly. montelukast (SINGULAIR) 10 mg tablet Take 1 Tablet by mouth daily. 30 Tablet 5    levocetirizine (XYZAL) 5 mg tablet Take 1 Tablet by mouth daily. 30 Tablet 5    omeprazole (PRILOSEC) 40 mg capsule Take 1 Capsule by mouth daily. 30 Capsule 5    ibuprofen (IBU) 800 mg tablet TAKE 1 TABLET EVERY 8 HOURS AS NEEDED FOR PAIN 90 Tablet 3    FreeStyle José 14 Day Sensor kit       irbesartan (AVAPRO) 300 mg tablet       BD Veo Insulin Syringe UF 1/2 mL 31 gauge x 15/64\" syrg       tamsulosin (FLOMAX) 0.4 mg capsule Take 2 Capsules by mouth daily. 180 Capsule 3    ascorbic acid, vitamin C, (VITAMIN C) 500 mg tablet Take 500 mg by mouth.      pravastatin (PRAVACHOL) 20 mg tablet Take 1 Tablet by mouth nightly. 90 Tablet 4    allopurinoL (ZYLOPRIM) 100 mg tablet Take 1 Tablet by mouth daily. 30 Tablet 12    flunisolide (NASAREL) 25 mcg (0.025 %) spry INSTILL 2 SPRAYS INTO EACH NOSTRIL TWICE A DAY 25 mL 12    Droplet Pen Needle 30 gauge x 5/16\" ndle       Droplet Pen Needle 31 gauge x 5/16\" ndle       Farxiga 10 mg tab tablet       Droplet Insulin Syr,half unit, 0.5 mL 31 gauge x 5/16\" syrg       multivit-min/folic/vit K/lycop (ONE-A-DAY MEN'S 50 PLUS PO) Take  by mouth. cholecalciferol (VITAMIN D3) (2,000 UNITS /50 MCG) cap capsule Take 2,000 Units by mouth daily. hydrochlorothiazide (HYDRODIURIL) 25 mg tablet TAKE 1 TABLET EVERY DAY 90 Tab 3    amLODIPine (NORVASC) 10 mg tablet TAKE 1 TABLET EVERY DAY 90 Tab 3    metFORMIN (GLUCOPHAGE) 1,000 mg tablet Take 1 Tab by mouth two (2) times daily (with meals). 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   Tobacco Use    Smoking status: Never    Smokeless tobacco: Never   Vaping Use    Vaping Use: Never used   Substance and Sexual Activity    Alcohol use: No    Drug use: Yes     Types: Prescription, OTC    Sexual activity: Not on file   Other Topics Concern    Not on file   Social History Narrative    Not on file     Social Determinants of Health     Financial Resource Strain: Not on file   Food Insecurity: Not on file   Transportation Needs: Not on file   Physical Activity: Not on file   Stress: Not on file   Social Connections: Not on file   Intimate Partner Violence: Not on file   Housing Stability: Not on file      Family History   Problem Relation Age of Onset    Heart Disease Mother     Heart Failure Mother     Kidney Disease Father     Diabetes Father     Cancer Father         prostate    Diabetes Sister     Hypertension Sister     Obesity Sister     Cancer Brother 52        prostate    Prostate Cancer Paternal Uncle         Physical Exam:    Visit Vitals  /70   Pulse 98   Ht 5' 8\" (1.727 m)   Wt 245 lb (111.1 kg)   SpO2 97%   BMI 37.25 kg/m²          Constitutional:  Patient is well developed, well nourished, and not distressed. HEENT: atraumatic, normocephalic, wearing a mask. Eyes:   Cunjunctivae clear, no scleral icterus  Cardiovascular:  Normal rate, regular rhythm  Pulmonary/Chest: Effort normal    Extremities: Normal range of motion. Wearing compression stockings. No edema right foot and ankle . Digits no cyanosis or clubbing  Neurological:  he  is alert and oriented x3 . Gait normal.   Psych: Appropriate mood and affect. Skin:  Skin is warm and dry. No rash noted.  No erythema. No ulcers. No prominent varicose veins or hyperpigmentation      Impression and Plan:  79 y.o. male with right lower extremity edema, likely multifactorial but partially due to R GSV insufficiency. Patient is practicing compression and elevation daily. Satisfactory results with only trace residual edema. Not interested in intervention. I think this is a reasonable decision given that he has no skin changes and only moderate reflux. Encouraged him to continue current conservative management. Prn follow up    Chip Yeh MD PhD  Vascular Surgery    Covering for [facility]          PLEASE NOTE:  This document has been produced using voice recognition software. Unrecognized errors in transcription may be present.

## 2022-11-10 ENCOUNTER — OFFICE VISIT (OUTPATIENT)
Dept: VASCULAR SURGERY | Age: 67
End: 2022-11-10
Payer: MEDICARE

## 2022-11-10 VITALS
SYSTOLIC BLOOD PRESSURE: 120 MMHG | DIASTOLIC BLOOD PRESSURE: 70 MMHG | HEART RATE: 98 BPM | WEIGHT: 245 LBS | OXYGEN SATURATION: 97 % | HEIGHT: 68 IN | BODY MASS INDEX: 37.13 KG/M2

## 2022-11-10 DIAGNOSIS — I87.2 VENOUS (PERIPHERAL) INSUFFICIENCY: Primary | ICD-10-CM

## 2022-11-10 PROCEDURE — G8752 SYS BP LESS 140: HCPCS | Performed by: SURGERY

## 2022-11-10 PROCEDURE — 1123F ACP DISCUSS/DSCN MKR DOCD: CPT | Performed by: SURGERY

## 2022-11-10 PROCEDURE — G8417 CALC BMI ABV UP PARAM F/U: HCPCS | Performed by: SURGERY

## 2022-11-10 PROCEDURE — G8432 DEP SCR NOT DOC, RNG: HCPCS | Performed by: SURGERY

## 2022-11-10 PROCEDURE — 1101F PT FALLS ASSESS-DOCD LE1/YR: CPT | Performed by: SURGERY

## 2022-11-10 PROCEDURE — 3074F SYST BP LT 130 MM HG: CPT | Performed by: SURGERY

## 2022-11-10 PROCEDURE — G8536 NO DOC ELDER MAL SCRN: HCPCS | Performed by: SURGERY

## 2022-11-10 PROCEDURE — 3017F COLORECTAL CA SCREEN DOC REV: CPT | Performed by: SURGERY

## 2022-11-10 PROCEDURE — 3078F DIAST BP <80 MM HG: CPT | Performed by: SURGERY

## 2022-11-10 PROCEDURE — G8754 DIAS BP LESS 90: HCPCS | Performed by: SURGERY

## 2022-11-10 PROCEDURE — G8427 DOCREV CUR MEDS BY ELIG CLIN: HCPCS | Performed by: SURGERY

## 2022-11-10 PROCEDURE — 99213 OFFICE O/P EST LOW 20 MIN: CPT | Performed by: SURGERY

## 2022-11-10 NOTE — PROGRESS NOTES
1. Have you been to the ER, urgent care clinic since your last visit? No  Hospitalized since your last visit? No    2. Have you seen or consulted any other health care providers outside of the [address] [place] [address] since your last visit? Include any pap smears or colon screening.  No

## 2022-12-04 DIAGNOSIS — J30.89 NON-SEASONAL ALLERGIC RHINITIS, UNSPECIFIED TRIGGER: ICD-10-CM

## 2022-12-05 DIAGNOSIS — J30.89 NON-SEASONAL ALLERGIC RHINITIS, UNSPECIFIED TRIGGER: ICD-10-CM

## 2022-12-05 RX ORDER — LEVOCETIRIZINE DIHYDROCHLORIDE 5 MG/1
TABLET, FILM COATED ORAL
Qty: 30 TABLET | Refills: 5 | Status: SHIPPED | OUTPATIENT
Start: 2022-12-05 | End: 2022-12-07 | Stop reason: SDUPTHER

## 2022-12-06 RX ORDER — MONTELUKAST SODIUM 10 MG/1
TABLET ORAL
Qty: 90 TABLET | Refills: 4 | Status: SHIPPED | OUTPATIENT
Start: 2022-12-06

## 2022-12-07 ENCOUNTER — OFFICE VISIT (OUTPATIENT)
Dept: FAMILY MEDICINE CLINIC | Age: 67
End: 2022-12-07
Payer: MEDICARE

## 2022-12-07 VITALS
HEIGHT: 68 IN | OXYGEN SATURATION: 99 % | SYSTOLIC BLOOD PRESSURE: 165 MMHG | TEMPERATURE: 98.1 F | WEIGHT: 254 LBS | RESPIRATION RATE: 17 BRPM | BODY MASS INDEX: 38.49 KG/M2 | HEART RATE: 91 BPM | DIASTOLIC BLOOD PRESSURE: 76 MMHG

## 2022-12-07 DIAGNOSIS — R06.83 SNORING: ICD-10-CM

## 2022-12-07 DIAGNOSIS — E78.5 HYPERLIPIDEMIA ASSOCIATED WITH TYPE 2 DIABETES MELLITUS (HCC): ICD-10-CM

## 2022-12-07 DIAGNOSIS — E11.69 HYPERLIPIDEMIA ASSOCIATED WITH TYPE 2 DIABETES MELLITUS (HCC): ICD-10-CM

## 2022-12-07 DIAGNOSIS — I10 ESSENTIAL HYPERTENSION: ICD-10-CM

## 2022-12-07 DIAGNOSIS — E11.65 TYPE 2 DIABETES MELLITUS WITH HYPERGLYCEMIA, WITH LONG-TERM CURRENT USE OF INSULIN (HCC): Primary | ICD-10-CM

## 2022-12-07 DIAGNOSIS — G31.84 MILD NEUROCOGNITIVE DISORDER: ICD-10-CM

## 2022-12-07 DIAGNOSIS — K21.9 GASTROESOPHAGEAL REFLUX DISEASE, UNSPECIFIED WHETHER ESOPHAGITIS PRESENT: ICD-10-CM

## 2022-12-07 DIAGNOSIS — N52.9 ERECTILE DYSFUNCTION, UNSPECIFIED ERECTILE DYSFUNCTION TYPE: ICD-10-CM

## 2022-12-07 DIAGNOSIS — Z79.4 TYPE 2 DIABETES MELLITUS WITH HYPERGLYCEMIA, WITH LONG-TERM CURRENT USE OF INSULIN (HCC): Primary | ICD-10-CM

## 2022-12-07 DIAGNOSIS — J30.89 NON-SEASONAL ALLERGIC RHINITIS, UNSPECIFIED TRIGGER: ICD-10-CM

## 2022-12-07 DIAGNOSIS — E29.1 HYPOGONADISM MALE: ICD-10-CM

## 2022-12-07 RX ORDER — LEVOCETIRIZINE DIHYDROCHLORIDE 5 MG/1
5 TABLET, FILM COATED ORAL DAILY
Qty: 90 TABLET | Refills: 4 | Status: SHIPPED | OUTPATIENT
Start: 2022-12-07

## 2022-12-07 RX ORDER — OMEPRAZOLE 40 MG/1
40 CAPSULE, DELAYED RELEASE ORAL DAILY
Qty: 90 CAPSULE | Refills: 4 | Status: SHIPPED | OUTPATIENT
Start: 2022-12-07

## 2022-12-07 NOTE — PROGRESS NOTES
Manuel Hyde presents today for   Chief Complaint   Patient presents with    Cholesterol Problem    Hypertension    Diabetes       Is someone accompanying this pt? no    Is the patient using any DME equipment during OV? no    Depression Screening:  3 most recent PHQ Screens 12/7/2022   Little interest or pleasure in doing things Not at all   Feeling down, depressed, irritable, or hopeless Not at all   Total Score PHQ 2 0       Learning Assessment:  Learning Assessment 9/6/2022   PRIMARY LEARNER Patient   HIGHEST LEVEL OF EDUCATION - PRIMARY LEARNER  -   BARRIERS PRIMARY LEARNER -     -   [address] -   ANSWERED BY patient   RELATIONSHIP SELF       Fall Risk  Fall Risk Assessment, last 12 mths 9/6/2022   Able to walk? Yes   Fall in past 12 months? 0   Do you feel unsteady? 0   Are you worried about falling 0       ADL  No flowsheet data found. Travel Screening:    Travel Screening       Question Response    In the last 10 days, have you been in contact with someone who was confirmed or suspected to have Coronavirus/COVID-19? No / Unsure    Have you had a COVID-19 viral test in the last 10 days? No    Do you have any of the following new or worsening symptoms? None of these    Have you traveled internationally or domestically in the last month? No          Travel History   Travel since 11/07/22    No documented travel since 11/07/22         Health Maintenance reviewed and discussed and ordered per Provider.     Health Maintenance Due   Topic Date Due    Hepatitis C Screening  Never done    Pneumococcal 65+ years (2 - PPSV23 if available, else PCV20) 11/04/2021    Lipid Screen  11/17/2021    Foot Exam Q1  05/19/2022    COVID-19 Vaccine (5 - Booster for Pfizer series) 05/30/2022   . Coordination of Care:  1. Have you been to the ER, urgent care clinic since your last visit? Hospitalized since your last visit? no    2. Have you seen or consulted any other health care providers outside of the [address] [place] [address] since your last visit? Include any pap smears or colon screening.  no

## 2022-12-07 NOTE — PROGRESS NOTES
Chief Complaint   Patient presents with    Cholesterol Problem    Hypertension    Diabetes           HPI    Brit Gutierrez is a 79 y.o. male presenting today for  3 months  follow up of dm, htn, hld. Pt cont to follow closely with endo. His blood sugars are ranging 80-120s in the morning; his meds were adjusted by endo. An insulin was stopped and glyburide was also stopped. Pt is still going to the gym 3-4 days per week. Pt has taken his bp med this am but admits to some white coat hypertension. Pt had f/up with vas surg. He is doing well with compression and elevation. They have agreed to cont conservative mgmt. Pt is following closely with uro as well for bph, ed, and hypogonadism. Pt states his energy was very low; it seems better since starting the testosterone replacement. Patient had labs with endo. Patient does need medication refills today. New concerns today: pt would like to discuss his neuropsych eval.  He was told to discuss med initiation with his pcp. He was also advised to have a sleep study. Review of Systems   Constitutional: Negative. HENT: Negative. Respiratory: Negative. Cardiovascular: Negative. All other systems reviewed and are negative. Physical Exam  Vitals and nursing note reviewed. Constitutional:       General: He is not in acute distress. Appearance: Normal appearance. HENT:      Head: Normocephalic and atraumatic. Right Ear: External ear normal.      Left Ear: External ear normal.      Nose: Nose normal.   Eyes:      Extraocular Movements: Extraocular movements intact. Conjunctiva/sclera: Conjunctivae normal.   Cardiovascular:      Rate and Rhythm: Normal rate and regular rhythm. Heart sounds: No murmur heard. No friction rub. No gallop. Pulmonary:      Effort: Pulmonary effort is normal.      Breath sounds: Normal breath sounds. No wheezing, rhonchi or rales.    Musculoskeletal:         General: Normal range of motion. Cervical back: Normal range of motion. No rigidity. Skin:     General: Skin is warm and dry. Neurological:      Mental Status: He is alert and oriented to person, place, and time. Coordination: Coordination normal.   Psychiatric:         Mood and Affect: Mood normal.         Behavior: Behavior normal.         Thought Content: Thought content normal.         Judgment: Judgment normal.       Diagnoses and all orders for this visit:    1. Type 2 diabetes mellitus with hyperglycemia, with long-term current use of insulin ([facility])  Stable, cont pres tx plan. 2. Hyperlipidemia associated with type 2 diabetes mellitus ([facility] [address]  Labs with endo    3. Essential hypertension  Elevated today. Cont close home bp monitoring    4. Erectile dysfunction, unspecified erectile dysfunction type  5. Hypogonadism male  Care as per uro    6. Non-seasonal allergic rhinitis, unspecified trigger  -     levocetirizine (XYZAL) 5 mg tablet; Take 1 Tablet by mouth daily. 7. Gastroesophageal reflux disease, unspecified whether esophagitis present  -     omeprazole (PRILOSEC) 40 mg capsule; Take 1 Capsule by mouth daily. 8. Mild neurocognitive disorder  -     REFERRAL TO NEUROLOGY    9. Snoring  -     REFERRAL TO NEUROLOGY    Follow-up and Dispositions    Return in about 3 months (around 3/7/2023) for diabetes, high blood pressure, high cholesterol, lab review. Over 45 min spent on this established pt encounter on the date of service.

## 2022-12-21 DIAGNOSIS — J30.89 NON-SEASONAL ALLERGIC RHINITIS, UNSPECIFIED TRIGGER: ICD-10-CM

## 2022-12-21 RX ORDER — FLUNISOLIDE 0.25 MG/ML
SOLUTION NASAL
Qty: 25 ML | Refills: 12 | Status: SHIPPED | OUTPATIENT
Start: 2022-12-21

## 2023-02-15 RX ORDER — ALLOPURINOL 100 MG/1
TABLET ORAL
Qty: 90 TABLET | Refills: 4 | Status: SHIPPED | OUTPATIENT
Start: 2023-02-15

## 2023-03-14 LAB — HBA1C MFR BLD HPLC: 8.3 %

## 2023-03-21 ENCOUNTER — TELEPHONE (OUTPATIENT)
Facility: CLINIC | Age: 68
End: 2023-03-21

## 2023-03-21 NOTE — LETTER
March 21, 2023       Susan Blackwell YOB: 1955   [address] [address]        To Whom It May Concern:    Susan Blackwell is legally blind. It would be beneficial for him to have his mailbox next to his home; this would minimize safety risks for him. If there are further concerns, please have Joaquín Hairston Span contact this office.        Sincerely,        Jt Burleson MD

## 2023-04-18 ENCOUNTER — HOSPITAL ENCOUNTER (OUTPATIENT)
Facility: HOSPITAL | Age: 68
Discharge: HOME | End: 2023-04-21

## 2023-04-18 LAB — LABCORP SPECIMEN COLLECTION: NORMAL

## 2023-04-18 PROCEDURE — 99001 SPECIMEN HANDLING PT-LAB: CPT

## 2023-04-20 RX ORDER — PRAVASTATIN SODIUM 20 MG
TABLET ORAL
Qty: 90 TABLET | Refills: 0 | Status: SHIPPED | OUTPATIENT
Start: 2023-04-20

## 2023-06-16 ENCOUNTER — OFFICE VISIT (OUTPATIENT)
Facility: CLINIC | Age: 68
End: 2023-06-16
Payer: MEDICARE

## 2023-06-16 VITALS
DIASTOLIC BLOOD PRESSURE: 81 MMHG | HEART RATE: 74 BPM | OXYGEN SATURATION: 98 % | SYSTOLIC BLOOD PRESSURE: 147 MMHG | BODY MASS INDEX: 38.53 KG/M2 | RESPIRATION RATE: 16 BRPM | WEIGHT: 253.4 LBS

## 2023-06-16 DIAGNOSIS — J30.89 OTHER ALLERGIC RHINITIS: ICD-10-CM

## 2023-06-16 DIAGNOSIS — E66.01 SEVERE OBESITY (BMI 35.0-39.9) WITH COMORBIDITY (HCC): ICD-10-CM

## 2023-06-16 DIAGNOSIS — K21.9 GASTRO-ESOPHAGEAL REFLUX DISEASE WITHOUT ESOPHAGITIS: ICD-10-CM

## 2023-06-16 DIAGNOSIS — E11.65 TYPE 2 DIABETES MELLITUS WITH HYPERGLYCEMIA, UNSPECIFIED WHETHER LONG TERM INSULIN USE (HCC): Primary | ICD-10-CM

## 2023-06-16 DIAGNOSIS — E11.69 HYPERLIPIDEMIA ASSOCIATED WITH TYPE 2 DIABETES MELLITUS (HCC): ICD-10-CM

## 2023-06-16 DIAGNOSIS — I10 ESSENTIAL (PRIMARY) HYPERTENSION: ICD-10-CM

## 2023-06-16 DIAGNOSIS — K59.09 CHRONIC CONSTIPATION: ICD-10-CM

## 2023-06-16 DIAGNOSIS — E78.5 HYPERLIPIDEMIA ASSOCIATED WITH TYPE 2 DIABETES MELLITUS (HCC): ICD-10-CM

## 2023-06-16 PROCEDURE — 3074F SYST BP LT 130 MM HG: CPT | Performed by: FAMILY MEDICINE

## 2023-06-16 PROCEDURE — 3078F DIAST BP <80 MM HG: CPT | Performed by: FAMILY MEDICINE

## 2023-06-16 PROCEDURE — 1123F ACP DISCUSS/DSCN MKR DOCD: CPT | Performed by: FAMILY MEDICINE

## 2023-06-16 PROCEDURE — 99214 OFFICE O/P EST MOD 30 MIN: CPT | Performed by: FAMILY MEDICINE

## 2023-06-16 RX ORDER — OMEPRAZOLE 40 MG/1
40 CAPSULE, DELAYED RELEASE ORAL DAILY
Qty: 90 CAPSULE | Refills: 4 | Status: SHIPPED | OUTPATIENT
Start: 2023-06-16

## 2023-06-16 ASSESSMENT — ENCOUNTER SYMPTOMS
RESPIRATORY NEGATIVE: 1
CONSTIPATION: 1

## 2023-06-16 ASSESSMENT — PATIENT HEALTH QUESTIONNAIRE - PHQ9
SUM OF ALL RESPONSES TO PHQ QUESTIONS 1-9: 0
2. FEELING DOWN, DEPRESSED OR HOPELESS: 0
SUM OF ALL RESPONSES TO PHQ QUESTIONS 1-9: 0
SUM OF ALL RESPONSES TO PHQ QUESTIONS 1-9: 0
1. LITTLE INTEREST OR PLEASURE IN DOING THINGS: 0
SUM OF ALL RESPONSES TO PHQ9 QUESTIONS 1 & 2: 0
SUM OF ALL RESPONSES TO PHQ QUESTIONS 1-9: 0

## 2023-07-31 RX ORDER — ALLOPURINOL 100 MG/1
100 TABLET ORAL DAILY
Qty: 90 TABLET | Refills: 3 | Status: SHIPPED | OUTPATIENT
Start: 2023-07-31

## 2023-07-31 RX ORDER — IBUPROFEN 800 MG/1
TABLET ORAL
Qty: 120 TABLET | Refills: 1 | Status: SHIPPED | OUTPATIENT
Start: 2023-07-31

## 2023-07-31 RX ORDER — PRAVASTATIN SODIUM 20 MG
20 TABLET ORAL NIGHTLY
Qty: 90 TABLET | Refills: 3 | Status: SHIPPED | OUTPATIENT
Start: 2023-07-31

## 2023-07-31 NOTE — TELEPHONE ENCOUNTER
Kavin Campos (spouse) called requesting refill of meds for pt.     Next appt: 9/1/23  Last appt: 6/16/23  Last lab: 4/18/23

## 2023-08-29 ENCOUNTER — TELEPHONE (OUTPATIENT)
Facility: CLINIC | Age: 68
End: 2023-08-29

## 2023-08-29 NOTE — TELEPHONE ENCOUNTER
Mrs. Wayne Mclean returned a missed call from this office. I don't see any documentation so it was probably a reminder call for 9/1 appt. He has [facility] and she states that he received notice he cannot come to Dr. Karen Rodgers and has been assigned to a Dr. Alesia Suresh in [facility], who he has not seen. I advised her that he could call Medicare and try changing to a plan we take so that he can continue coming here. She will discuss this with him.

## 2023-11-22 ENCOUNTER — OFFICE VISIT (OUTPATIENT)
Facility: CLINIC | Age: 68
End: 2023-11-22
Payer: MEDICARE

## 2023-11-22 VITALS
OXYGEN SATURATION: 98 % | BODY MASS INDEX: 38.95 KG/M2 | HEART RATE: 96 BPM | WEIGHT: 257 LBS | SYSTOLIC BLOOD PRESSURE: 153 MMHG | HEIGHT: 68 IN | TEMPERATURE: 98 F | RESPIRATION RATE: 16 BRPM | DIASTOLIC BLOOD PRESSURE: 83 MMHG

## 2023-11-22 DIAGNOSIS — E11.69 HYPERLIPIDEMIA ASSOCIATED WITH TYPE 2 DIABETES MELLITUS (HCC): ICD-10-CM

## 2023-11-22 DIAGNOSIS — E78.5 HYPERLIPIDEMIA ASSOCIATED WITH TYPE 2 DIABETES MELLITUS (HCC): ICD-10-CM

## 2023-11-22 DIAGNOSIS — E11.65 TYPE 2 DIABETES MELLITUS WITH HYPERGLYCEMIA, UNSPECIFIED WHETHER LONG TERM INSULIN USE (HCC): ICD-10-CM

## 2023-11-22 DIAGNOSIS — K59.09 CHRONIC CONSTIPATION: ICD-10-CM

## 2023-11-22 DIAGNOSIS — Z00.00 MEDICARE ANNUAL WELLNESS VISIT, INITIAL: Primary | ICD-10-CM

## 2023-11-22 DIAGNOSIS — J30.89 OTHER ALLERGIC RHINITIS: ICD-10-CM

## 2023-11-22 DIAGNOSIS — Z71.89 ACP (ADVANCE CARE PLANNING): ICD-10-CM

## 2023-11-22 DIAGNOSIS — I10 ESSENTIAL (PRIMARY) HYPERTENSION: ICD-10-CM

## 2023-11-22 DIAGNOSIS — Z12.5 SCREENING FOR MALIGNANT NEOPLASM OF PROSTATE: ICD-10-CM

## 2023-11-22 DIAGNOSIS — K21.9 GASTRO-ESOPHAGEAL REFLUX DISEASE WITHOUT ESOPHAGITIS: ICD-10-CM

## 2023-11-22 PROCEDURE — 3078F DIAST BP <80 MM HG: CPT | Performed by: FAMILY MEDICINE

## 2023-11-22 PROCEDURE — 3074F SYST BP LT 130 MM HG: CPT | Performed by: FAMILY MEDICINE

## 2023-11-22 PROCEDURE — G0439 PPPS, SUBSEQ VISIT: HCPCS | Performed by: FAMILY MEDICINE

## 2023-11-22 PROCEDURE — 1123F ACP DISCUSS/DSCN MKR DOCD: CPT | Performed by: FAMILY MEDICINE

## 2023-11-22 PROCEDURE — 99497 ADVNCD CARE PLAN 30 MIN: CPT | Performed by: FAMILY MEDICINE

## 2023-11-22 PROCEDURE — 99214 OFFICE O/P EST MOD 30 MIN: CPT | Performed by: FAMILY MEDICINE

## 2023-11-22 RX ORDER — LEVOCETIRIZINE DIHYDROCHLORIDE 5 MG/1
5 TABLET, FILM COATED ORAL DAILY
Qty: 90 TABLET | Refills: 4 | Status: SHIPPED | OUTPATIENT
Start: 2023-11-22

## 2023-11-22 RX ORDER — MONTELUKAST SODIUM 10 MG/1
10 TABLET ORAL DAILY
Qty: 90 TABLET | Refills: 4 | Status: SHIPPED | OUTPATIENT
Start: 2023-11-22

## 2023-11-22 SDOH — ECONOMIC STABILITY: FOOD INSECURITY: WITHIN THE PAST 12 MONTHS, THE FOOD YOU BOUGHT JUST DIDN'T LAST AND YOU DIDN'T HAVE MONEY TO GET MORE.: NEVER TRUE

## 2023-11-22 SDOH — ECONOMIC STABILITY: FOOD INSECURITY: WITHIN THE PAST 12 MONTHS, YOU WORRIED THAT YOUR FOOD WOULD RUN OUT BEFORE YOU GOT MONEY TO BUY MORE.: NEVER TRUE

## 2023-11-22 SDOH — ECONOMIC STABILITY: HOUSING INSECURITY
IN THE LAST 12 MONTHS, WAS THERE A TIME WHEN YOU DID NOT HAVE A STEADY PLACE TO SLEEP OR SLEPT IN A SHELTER (INCLUDING NOW)?: NO

## 2023-11-22 SDOH — ECONOMIC STABILITY: INCOME INSECURITY: HOW HARD IS IT FOR YOU TO PAY FOR THE VERY BASICS LIKE FOOD, HOUSING, MEDICAL CARE, AND HEATING?: NOT HARD AT ALL

## 2023-11-22 ASSESSMENT — PATIENT HEALTH QUESTIONNAIRE - PHQ9
2. FEELING DOWN, DEPRESSED OR HOPELESS: 0
1. LITTLE INTEREST OR PLEASURE IN DOING THINGS: 0
SUM OF ALL RESPONSES TO PHQ QUESTIONS 1-9: 0
SUM OF ALL RESPONSES TO PHQ QUESTIONS 1-9: 0
SUM OF ALL RESPONSES TO PHQ9 QUESTIONS 1 & 2: 0
SUM OF ALL RESPONSES TO PHQ QUESTIONS 1-9: 0
SUM OF ALL RESPONSES TO PHQ QUESTIONS 1-9: 0

## 2023-11-22 ASSESSMENT — LIFESTYLE VARIABLES
HOW MANY STANDARD DRINKS CONTAINING ALCOHOL DO YOU HAVE ON A TYPICAL DAY: PATIENT DOES NOT DRINK
HOW OFTEN DO YOU HAVE A DRINK CONTAINING ALCOHOL: NEVER

## 2023-11-22 ASSESSMENT — ENCOUNTER SYMPTOMS: RESPIRATORY NEGATIVE: 1

## 2023-11-22 NOTE — PATIENT INSTRUCTIONS

## 2023-11-22 NOTE — PROGRESS NOTES
Advance Care Planning     Advance Care Planning (ACP) Physician/NP/PA Conversation    Date of Conversation: 11/22/2023  Conducted with: Patient with Decision Making Capacity    Healthcare Decision Maker:    Primary Decision Maker: Catarino Calderón - [phone]    Secondary Decision Maker: Lynn Houston - [phone]  Click here to complete Healthcare Decision Makers including selection of the Healthcare Decision Maker Relationship (ie \"Primary\"). Care Preferences:    Hospitalization: \"If your health worsens and it becomes clear that your chance of recovery is unlikely, what would be your preference regarding hospitalization? \"  The patient would prefer comfort-focused treatment without hospitalization. Ventilation: \"If you were unable to breath on your own and your chance of recovery was unlikely, what would be your preference about the use of a ventilator (breathing machine) if it was available to you? \"  The patient would desire the use of a ventilator. Resuscitation: \"In the event your heart stopped as a result of an underlying serious health condition, would you want attempts made to restart your heart, or would you prefer a natural death? \"  Yes, attempt to resuscitate.         Conversation Outcomes / Follow-Up Plan:  ACP in process - information provided, considering goals and options  Reviewed DNR/DNI and patient elects Full Code (Attempt Resuscitation)    Length of Voluntary ACP Conversation in minutes:  16 minutes    Mildred Workman MD

## 2023-11-22 NOTE — PROGRESS NOTES
ASSESSMENT/PLAN:  1. Type 2 diabetes mellitus with hyperglycemia, unspecified whether long term insulin use (HCC)  Assessment & Plan:   Uncontrolled, continue current medications and care as per endo  2. Hyperlipidemia associated with type 2 diabetes mellitus ([address])  3. Essential (primary) hypertension  Assessment & Plan:   Unclear control, continue current medications and NV for bp recheck  4. Gastro-esophageal reflux disease without esophagitis  Assessment & Plan:   Well-controlled, continue current medications  5. Chronic constipation  Assessment & Plan:   Well-controlled, continue current medications  6. Other allergic rhinitis  -     montelukast (SINGULAIR) 10 MG tablet; Take 1 tablet by mouth daily, Disp-90 tablet, R-4Normal  -     levocetirizine (XYZAL) 5 MG tablet; Take 1 tablet by mouth daily, Disp-90 tablet, R-4Normal  7. Screening for malignant neoplasm of prostate  -     PSA Screening; Future        Return in about 3 months (around 2/22/2024) for DM, HTN, HLD, allergic rhinitis, constipation, gerd. SUBJECTIVE/OBJECTIVE:    Chief Complaint   Patient presents with    Medicare AWV         HPI    Ainsley Vance is a 76 y.o. male presenting today for delayed  follow up of dm, htn, hld, constipation, AR, gerd. Stephany Hannon was added at last appt. Pt and wife report that it has gone well; he tends to move his bowels daily. Last A1c was 8.2. Pt cont to f/u with endo. No meds were changed. Patient does not need medication refills today. New concerns today: none      Pt has had his covid booster and a flu shot. Review of Systems   Constitutional: Negative. HENT: Negative. Respiratory: Negative. Cardiovascular: Negative. All other systems reviewed and are negative. Physical Exam  Vitals and nursing note reviewed. Constitutional:       General: He is not in acute distress. Appearance: Normal appearance. HENT:      Head: Normocephalic and atraumatic.       Right

## 2023-11-22 NOTE — PROGRESS NOTES
Dina Scott presents today for   Chief Complaint   Patient presents with    Medicare AWV       Is someone accompanying this pt? Karis Branch Wife    Is the patient using any DME equipment during OV? No     Depression Screenin/22/2023    10:45 AM 2023    11:46 AM 2022     8:20 AM 2022     8:44 AM 10/21/2021     9:41 AM 2021     9:44 AM 2021     9:02 AM   PHQ-9 Questionaire   Little interest or pleasure in doing things 0 0 0 0 0 0 0   Feeling down, depressed, or hopeless 0 0 0 0 0 0 0   PHQ-9 Total Score 0 0 0 0 0 0 0        GUILLERMO 7-Anxiety        No data to display                 Learning Assessment:  No question data found. Fall Risk       No data to display                   Travel Screening:    Travel Screening       Question Response    Have you been in contact with someone who was sick? No / Unsure    Do you have any of the following new or worsening symptoms? None of these    Have you traveled internationally or domestically in the last month? No          Travel History   Travel since 10/22/23    No documented travel since 10/22/23          Health Maintenance reviewed and discussed and ordered per Provider.   Social Determinants of Health     Tobacco Use: Low Risk  (2023)    Patient History     Smoking Tobacco Use: Never     Smokeless Tobacco Use: Never     Passive Exposure: Not on file   Alcohol Use: Not At Risk (2023)    AUDIT-C     Frequency of Alcohol Consumption: Never     Average Number of Drinks: Patient does not drink     Frequency of Binge Drinking: Never   Financial Resource Strain: Low Risk  (2023)    Overall Financial Resource Strain (CARDIA)     Difficulty of Paying Living Expenses: Not hard at all   Food Insecurity: No Food Insecurity (2023)    Hunger Vital Sign     Worried About Running Out of Food in the Last Year: Never true     Ran Out of Food in the Last Year: Never true   Transportation Needs: Unknown (2023)

## 2023-11-30 PROBLEM — K21.9 GASTRO-ESOPHAGEAL REFLUX DISEASE WITHOUT ESOPHAGITIS: Status: ACTIVE | Noted: 2023-11-30

## 2023-11-30 PROBLEM — J30.89 OTHER ALLERGIC RHINITIS: Status: ACTIVE | Noted: 2020-11-03

## 2023-11-30 PROBLEM — K59.09 CHRONIC CONSTIPATION: Status: ACTIVE | Noted: 2023-11-30

## 2024-03-05 ENCOUNTER — HOSPITAL ENCOUNTER (OUTPATIENT)
Facility: HOSPITAL | Age: 69
Discharge: HOME | End: 2024-03-08

## 2024-03-05 LAB — LABCORP SPECIMEN COLLECTION: NORMAL

## 2024-03-06 LAB — PSA SERPL-MCNC: 3.7 NG/ML (ref 0–4)

## 2024-03-08 LAB — HBA1C MFR BLD HPLC: 9.3 %

## 2024-04-04 ENCOUNTER — OFFICE VISIT (OUTPATIENT)
Facility: CLINIC | Age: 69
End: 2024-04-04
Payer: MEDICARE

## 2024-04-04 VITALS
TEMPERATURE: 97.9 F | HEART RATE: 89 BPM | RESPIRATION RATE: 17 BRPM | DIASTOLIC BLOOD PRESSURE: 76 MMHG | HEIGHT: 68 IN | OXYGEN SATURATION: 98 % | SYSTOLIC BLOOD PRESSURE: 121 MMHG | BODY MASS INDEX: 38.65 KG/M2 | WEIGHT: 255 LBS

## 2024-04-04 DIAGNOSIS — K21.9 GASTRO-ESOPHAGEAL REFLUX DISEASE WITHOUT ESOPHAGITIS: ICD-10-CM

## 2024-04-04 DIAGNOSIS — J30.89 OTHER ALLERGIC RHINITIS: ICD-10-CM

## 2024-04-04 DIAGNOSIS — K59.09 CHRONIC CONSTIPATION: ICD-10-CM

## 2024-04-04 DIAGNOSIS — E11.69 HYPERLIPIDEMIA ASSOCIATED WITH TYPE 2 DIABETES MELLITUS (HCC): ICD-10-CM

## 2024-04-04 DIAGNOSIS — E78.5 HYPERLIPIDEMIA ASSOCIATED WITH TYPE 2 DIABETES MELLITUS (HCC): ICD-10-CM

## 2024-04-04 DIAGNOSIS — E66.01 SEVERE OBESITY (BMI 35.0-39.9) WITH COMORBIDITY (HCC): ICD-10-CM

## 2024-04-04 DIAGNOSIS — E11.65 TYPE 2 DIABETES MELLITUS WITH HYPERGLYCEMIA, UNSPECIFIED WHETHER LONG TERM INSULIN USE (HCC): Primary | ICD-10-CM

## 2024-04-04 DIAGNOSIS — I10 ESSENTIAL (PRIMARY) HYPERTENSION: ICD-10-CM

## 2024-04-04 PROCEDURE — 1123F ACP DISCUSS/DSCN MKR DOCD: CPT | Performed by: FAMILY MEDICINE

## 2024-04-04 PROCEDURE — 99214 OFFICE O/P EST MOD 30 MIN: CPT | Performed by: FAMILY MEDICINE

## 2024-04-04 PROCEDURE — 3074F SYST BP LT 130 MM HG: CPT | Performed by: FAMILY MEDICINE

## 2024-04-04 PROCEDURE — 3078F DIAST BP <80 MM HG: CPT | Performed by: FAMILY MEDICINE

## 2024-04-04 RX ORDER — OMEPRAZOLE 40 MG/1
40 CAPSULE, DELAYED RELEASE ORAL DAILY
Qty: 90 CAPSULE | Refills: 4 | Status: SHIPPED | OUTPATIENT
Start: 2024-04-04

## 2024-04-04 RX ORDER — SEMAGLUTIDE 1.34 MG/ML
INJECTION, SOLUTION SUBCUTANEOUS
COMMUNITY

## 2024-04-04 SDOH — ECONOMIC STABILITY: FOOD INSECURITY: WITHIN THE PAST 12 MONTHS, YOU WORRIED THAT YOUR FOOD WOULD RUN OUT BEFORE YOU GOT MONEY TO BUY MORE.: NEVER TRUE

## 2024-04-04 SDOH — ECONOMIC STABILITY: FOOD INSECURITY: WITHIN THE PAST 12 MONTHS, THE FOOD YOU BOUGHT JUST DIDN'T LAST AND YOU DIDN'T HAVE MONEY TO GET MORE.: NEVER TRUE

## 2024-04-04 SDOH — ECONOMIC STABILITY: INCOME INSECURITY: HOW HARD IS IT FOR YOU TO PAY FOR THE VERY BASICS LIKE FOOD, HOUSING, MEDICAL CARE, AND HEATING?: NOT HARD AT ALL

## 2024-04-04 ASSESSMENT — PATIENT HEALTH QUESTIONNAIRE - PHQ9
SUM OF ALL RESPONSES TO PHQ QUESTIONS 1-9: 0
SUM OF ALL RESPONSES TO PHQ9 QUESTIONS 1 & 2: 0
SUM OF ALL RESPONSES TO PHQ QUESTIONS 1-9: 0
2. FEELING DOWN, DEPRESSED OR HOPELESS: NOT AT ALL
SUM OF ALL RESPONSES TO PHQ QUESTIONS 1-9: 0
SUM OF ALL RESPONSES TO PHQ QUESTIONS 1-9: 0
1. LITTLE INTEREST OR PLEASURE IN DOING THINGS: NOT AT ALL

## 2024-04-04 NOTE — PROGRESS NOTES
Tavon Luther presents today for   Chief Complaint   Patient presents with    Cholesterol Problem    Hypertension    Diabetes    Gastroesophageal Reflux    Allergic Rhinitis     Constipation       Is someone accompanying this pt? Wife     Is the patient using any DME equipment during OV? No     Depression Screenin/4/2024    11:12 AM 2023    10:45 AM 2023    11:46 AM 2022     8:20 AM 2022     8:44 AM 10/21/2021     9:41 AM 2021     9:44 AM   PHQ-9 Questionaire   Little interest or pleasure in doing things 0 0 0 0 0 0 0   Feeling down, depressed, or hopeless 0 0 0 0 0 0 0   PHQ-9 Total Score 0 0 0 0 0 0 0        GUILLERMO 7-Anxiety        No data to display                   Learning Assessment:  No question data found.     Fall Risk       No data to display                   Travel Screening:    Travel Screening       Question Response    Have you been in contact with someone who was sick? No / Unsure    Do you have any of the following new or worsening symptoms? None of these    Have you traveled internationally or domestically in the last month? No          Travel History   Travel since 24    No documented travel since 24          Health Maintenance reviewed and discussed and ordered per Provider.  Transportation Needs: Unknown (2024)    PRAPARE - Transportation     Lack of Transportation (Medical): Not on file     Lack of Transportation (Non-Medical): No      Food Insecurity: No Food Insecurity (2024)    Hunger Vital Sign     Worried About Running Out of Food in the Last Year: Never true     Ran Out of Food in the Last Year: Never true     Financial Resource Strain: Low Risk  (2024)    Overall Financial Resource Strain (CARDIA)     Difficulty of Paying Living Expenses: Not hard at all     Housing Stability: Unknown (2024)    Housing Stability Vital Sign     Unable to Pay for Housing in the Last Year: Not on file     Number of Places Lived in the Last 
Normocephalic and atraumatic.      Right Ear: External ear normal.      Left Ear: External ear normal.      Nose: Nose normal.      Mouth/Throat:      Mouth: Mucous membranes are moist.   Eyes:      Extraocular Movements: Extraocular movements intact.      Conjunctiva/sclera: Conjunctivae normal.   Cardiovascular:      Rate and Rhythm: Normal rate and regular rhythm.      Heart sounds: No murmur heard.     No friction rub. No gallop.   Pulmonary:      Effort: Pulmonary effort is normal.      Breath sounds: Normal breath sounds. No wheezing, rhonchi or rales.   Musculoskeletal:         General: Normal range of motion.      Cervical back: Normal range of motion.   Skin:     General: Skin is warm and dry.   Neurological:      Mental Status: He is alert and oriented to person, place, and time.      Coordination: Coordination normal.   Psychiatric:         Mood and Affect: Mood normal.         Behavior: Behavior normal.         Thought Content: Thought content normal.         Judgment: Judgment normal.                     An electronic signature was used to authenticate this note.    --Mai Higgins MD

## 2024-04-14 PROBLEM — E78.5 HYPERLIPIDEMIA ASSOCIATED WITH TYPE 2 DIABETES MELLITUS (HCC): Chronic | Status: ACTIVE | Noted: 2020-10-07

## 2024-04-14 PROBLEM — E11.69 HYPERLIPIDEMIA ASSOCIATED WITH TYPE 2 DIABETES MELLITUS (HCC): Chronic | Status: ACTIVE | Noted: 2020-10-07

## 2024-07-05 ENCOUNTER — OFFICE VISIT (OUTPATIENT)
Facility: CLINIC | Age: 69
End: 2024-07-05
Payer: MEDICARE

## 2024-07-05 VITALS
RESPIRATION RATE: 20 BRPM | WEIGHT: 245 LBS | OXYGEN SATURATION: 95 % | BODY MASS INDEX: 37.25 KG/M2 | SYSTOLIC BLOOD PRESSURE: 127 MMHG | TEMPERATURE: 98 F | DIASTOLIC BLOOD PRESSURE: 73 MMHG | HEART RATE: 89 BPM

## 2024-07-05 DIAGNOSIS — Z79.4 TYPE 2 DIABETES MELLITUS WITH OTHER SPECIFIED COMPLICATION, WITH LONG-TERM CURRENT USE OF INSULIN (HCC): Primary | ICD-10-CM

## 2024-07-05 DIAGNOSIS — K21.9 GASTRO-ESOPHAGEAL REFLUX DISEASE WITHOUT ESOPHAGITIS: ICD-10-CM

## 2024-07-05 DIAGNOSIS — I10 ESSENTIAL (PRIMARY) HYPERTENSION: ICD-10-CM

## 2024-07-05 DIAGNOSIS — M25.50 ARTHRALGIA, UNSPECIFIED JOINT: ICD-10-CM

## 2024-07-05 DIAGNOSIS — K59.09 CHRONIC CONSTIPATION: ICD-10-CM

## 2024-07-05 DIAGNOSIS — E11.69 HYPERLIPIDEMIA ASSOCIATED WITH TYPE 2 DIABETES MELLITUS (HCC): ICD-10-CM

## 2024-07-05 DIAGNOSIS — E11.69 TYPE 2 DIABETES MELLITUS WITH OTHER SPECIFIED COMPLICATION, WITH LONG-TERM CURRENT USE OF INSULIN (HCC): Primary | ICD-10-CM

## 2024-07-05 DIAGNOSIS — E78.5 HYPERLIPIDEMIA ASSOCIATED WITH TYPE 2 DIABETES MELLITUS (HCC): ICD-10-CM

## 2024-07-05 DIAGNOSIS — J30.89 OTHER ALLERGIC RHINITIS: ICD-10-CM

## 2024-07-05 PROCEDURE — 3078F DIAST BP <80 MM HG: CPT | Performed by: FAMILY MEDICINE

## 2024-07-05 PROCEDURE — 3074F SYST BP LT 130 MM HG: CPT | Performed by: FAMILY MEDICINE

## 2024-07-05 PROCEDURE — 1123F ACP DISCUSS/DSCN MKR DOCD: CPT | Performed by: FAMILY MEDICINE

## 2024-07-05 PROCEDURE — 99214 OFFICE O/P EST MOD 30 MIN: CPT | Performed by: FAMILY MEDICINE

## 2024-07-05 RX ORDER — IBUPROFEN 800 MG/1
TABLET ORAL
Qty: 120 TABLET | Refills: 5 | Status: SHIPPED | OUTPATIENT
Start: 2024-07-05

## 2024-07-05 RX ORDER — IBUPROFEN 800 MG/1
TABLET ORAL
Qty: 120 TABLET | Refills: 1 | OUTPATIENT
Start: 2024-07-05

## 2024-07-05 SDOH — ECONOMIC STABILITY: FOOD INSECURITY: WITHIN THE PAST 12 MONTHS, THE FOOD YOU BOUGHT JUST DIDN'T LAST AND YOU DIDN'T HAVE MONEY TO GET MORE.: NEVER TRUE

## 2024-07-05 SDOH — ECONOMIC STABILITY: FOOD INSECURITY: WITHIN THE PAST 12 MONTHS, YOU WORRIED THAT YOUR FOOD WOULD RUN OUT BEFORE YOU GOT MONEY TO BUY MORE.: NEVER TRUE

## 2024-07-05 SDOH — ECONOMIC STABILITY: INCOME INSECURITY: HOW HARD IS IT FOR YOU TO PAY FOR THE VERY BASICS LIKE FOOD, HOUSING, MEDICAL CARE, AND HEATING?: NOT HARD AT ALL

## 2024-07-05 ASSESSMENT — PATIENT HEALTH QUESTIONNAIRE - PHQ9
SUM OF ALL RESPONSES TO PHQ QUESTIONS 1-9: 0
2. FEELING DOWN, DEPRESSED OR HOPELESS: NOT AT ALL
SUM OF ALL RESPONSES TO PHQ QUESTIONS 1-9: 0
1. LITTLE INTEREST OR PLEASURE IN DOING THINGS: NOT AT ALL
SUM OF ALL RESPONSES TO PHQ QUESTIONS 1-9: 0
SUM OF ALL RESPONSES TO PHQ QUESTIONS 1-9: 0
SUM OF ALL RESPONSES TO PHQ9 QUESTIONS 1 & 2: 0

## 2024-07-05 ASSESSMENT — ENCOUNTER SYMPTOMS: RESPIRATORY NEGATIVE: 1

## 2024-07-05 NOTE — PROGRESS NOTES
ASSESSMENT/PLAN:  1. Type 2 diabetes mellitus with other specified complication, with long-term current use of insulin (HCC)  Assessment & Plan:    Much improved with ozempic.  Care as per endo.   2. Essential (primary) hypertension  Assessment & Plan:   Well-controlled, continue current medications  3. Hyperlipidemia associated with type 2 diabetes mellitus (HCC)  Assessment & Plan:   Monitored by specialist- no acute findings meriting change in the plan  4. Other allergic rhinitis  Assessment & Plan:   Well-controlled, continue current medications  5. Gastro-esophageal reflux disease without esophagitis  Assessment & Plan:   Well-controlled, continue current medications  6. Arthralgia, unspecified joint  -     ibuprofen (ADVIL;MOTRIN) 800 MG tablet; TAKE 1 TABLET EVERY 8 HOURS AS NEEDED FOR PAIN, Disp-120 tablet, R-5Normal  7. Chronic constipation  Assessment & Plan:    Unable to get linzess.  Doing ok with current tx plan.         Return in about 3 months (around 10/5/2024) for DM, HTN, HLD, gerd, constipation, (no labs).      SUBJECTIVE/OBJECTIVE:    Chief Complaint   Patient presents with    Diabetes    Hypertension    Cholesterol Problem    Allergic Rhinitis     Gastroesophageal Reflux    Constipation         HPI    Tavon Luther is a 69 y.o. male presenting today for    3 months  follow up of DM, hypertension, hyperlipidemia, allergic rhinitis, GERD, constipation.    Patient reports that his sugars are   fasting in the morning.   Patient is still taking Ozempic and reports that he is currently on 1mg daily.   Unfortunately, ozempic has put him in the donut hole.      Patient does not need medication refills today.      New concerns today: none        Review of Systems   Constitutional: Negative.    HENT: Negative.     Respiratory: Negative.     Cardiovascular: Negative.    All other systems reviewed and are negative.      Physical Exam  Vitals and nursing note reviewed.   Constitutional:       
(1 - Tdap) Never done    Respiratory Syncytial Virus (RSV) Pregnant or age 60 yrs+ (1 - 1-dose 60+ series) Never done    Pneumococcal 65+ years Vaccine (2 of 2 - PPSV23 or PCV20) 12/30/2020    Lipids  11/17/2021    GFR test (Diabetes, CKD 3-4, OR last GFR 15-59)  10/11/2022    COVID-19 Vaccine (5 - 2023-24 season) 09/01/2023   .      \"Have you been to the ER, urgent care clinic since your last visit?  Hospitalized since your last visit?\"    NO    “Have you seen or consulted any other health care providers outside of [facility] since your last visit?”    NO

## 2024-07-19 RX ORDER — PRAVASTATIN SODIUM 20 MG
20 TABLET ORAL NIGHTLY
Qty: 90 TABLET | Refills: 1 | Status: SHIPPED | OUTPATIENT
Start: 2024-07-19

## 2024-07-19 NOTE — TELEPHONE ENCOUNTER
Last seen 7/5/2024   Last labs   Last filled  7/31/23  Next appointment 11/29/2024     Lab Results   Component Value Date     10/11/2021    K 5.2 10/11/2021     10/11/2021    CO2 26 10/11/2021    BUN 23 (H) 10/11/2021    CREATININE 1.1 10/11/2021    GLUCOSE 219 (H) 10/11/2021    CALCIUM 10.4 10/11/2021    BILITOT 0.2 06/28/2024    ALKPHOS 105 06/28/2024    AST 31 06/28/2024    ALT 29 06/28/2024    LABGLOM >60.0 10/11/2021    GFRAA >60.0 10/11/2021    AGRATIO 1.1 10/11/2021    GLOB 3.9 10/11/2021

## 2024-08-07 RX ORDER — ALLOPURINOL 100 MG/1
100 TABLET ORAL DAILY
Qty: 90 TABLET | Refills: 3 | Status: SHIPPED | OUTPATIENT
Start: 2024-08-07

## 2024-11-18 ENCOUNTER — HOSPITAL ENCOUNTER (OUTPATIENT)
Facility: HOSPITAL | Age: 69
Setting detail: RECURRING SERIES
Discharge: HOME | End: 2024-11-21
Payer: MEDICARE

## 2024-11-18 PROCEDURE — 99214 OFFICE O/P EST MOD 30 MIN: CPT

## 2024-11-28 DIAGNOSIS — J30.89 OTHER ALLERGIC RHINITIS: ICD-10-CM

## 2024-11-29 ENCOUNTER — OFFICE VISIT (OUTPATIENT)
Facility: CLINIC | Age: 69
End: 2024-11-29
Payer: MEDICARE

## 2024-11-29 VITALS
BODY MASS INDEX: 37.44 KG/M2 | HEART RATE: 94 BPM | TEMPERATURE: 96.8 F | DIASTOLIC BLOOD PRESSURE: 74 MMHG | HEIGHT: 68 IN | SYSTOLIC BLOOD PRESSURE: 128 MMHG | OXYGEN SATURATION: 96 % | WEIGHT: 247 LBS | RESPIRATION RATE: 16 BRPM

## 2024-11-29 DIAGNOSIS — I10 ESSENTIAL (PRIMARY) HYPERTENSION: Chronic | ICD-10-CM

## 2024-11-29 DIAGNOSIS — N18.31 STAGE 3A CHRONIC KIDNEY DISEASE (HCC): ICD-10-CM

## 2024-11-29 DIAGNOSIS — E78.5 HYPERLIPIDEMIA ASSOCIATED WITH TYPE 2 DIABETES MELLITUS (HCC): Chronic | ICD-10-CM

## 2024-11-29 DIAGNOSIS — E11.22 TYPE 2 DIABETES MELLITUS WITH STAGE 3A CHRONIC KIDNEY DISEASE, WITH LONG-TERM CURRENT USE OF INSULIN (HCC): Primary | ICD-10-CM

## 2024-11-29 DIAGNOSIS — Z79.4 TYPE 2 DIABETES MELLITUS WITH STAGE 3A CHRONIC KIDNEY DISEASE, WITH LONG-TERM CURRENT USE OF INSULIN (HCC): Primary | ICD-10-CM

## 2024-11-29 DIAGNOSIS — E11.69 HYPERLIPIDEMIA ASSOCIATED WITH TYPE 2 DIABETES MELLITUS (HCC): Chronic | ICD-10-CM

## 2024-11-29 DIAGNOSIS — N18.31 TYPE 2 DIABETES MELLITUS WITH STAGE 3A CHRONIC KIDNEY DISEASE, WITH LONG-TERM CURRENT USE OF INSULIN (HCC): Primary | ICD-10-CM

## 2024-11-29 DIAGNOSIS — C61 PROSTATE CANCER (HCC): ICD-10-CM

## 2024-11-29 PROCEDURE — 99214 OFFICE O/P EST MOD 30 MIN: CPT | Performed by: FAMILY MEDICINE

## 2024-11-29 PROCEDURE — 1123F ACP DISCUSS/DSCN MKR DOCD: CPT | Performed by: FAMILY MEDICINE

## 2024-11-29 PROCEDURE — 3078F DIAST BP <80 MM HG: CPT | Performed by: FAMILY MEDICINE

## 2024-11-29 PROCEDURE — 3074F SYST BP LT 130 MM HG: CPT | Performed by: FAMILY MEDICINE

## 2024-11-29 RX ORDER — FLASH GLUCOSE SENSOR
KIT MISCELLANEOUS
COMMUNITY
Start: 2024-10-18

## 2024-11-29 RX ORDER — MONTELUKAST SODIUM 10 MG/1
10 TABLET ORAL DAILY
Qty: 90 TABLET | Refills: 4 | Status: SHIPPED | OUTPATIENT
Start: 2024-11-29

## 2024-11-29 SDOH — ECONOMIC STABILITY: FOOD INSECURITY: WITHIN THE PAST 12 MONTHS, THE FOOD YOU BOUGHT JUST DIDN'T LAST AND YOU DIDN'T HAVE MONEY TO GET MORE.: PATIENT DECLINED

## 2024-11-29 SDOH — ECONOMIC STABILITY: FOOD INSECURITY: WITHIN THE PAST 12 MONTHS, YOU WORRIED THAT YOUR FOOD WOULD RUN OUT BEFORE YOU GOT MONEY TO BUY MORE.: PATIENT DECLINED

## 2024-11-29 SDOH — ECONOMIC STABILITY: INCOME INSECURITY: HOW HARD IS IT FOR YOU TO PAY FOR THE VERY BASICS LIKE FOOD, HOUSING, MEDICAL CARE, AND HEATING?: PATIENT DECLINED

## 2024-11-29 ASSESSMENT — ENCOUNTER SYMPTOMS: RESPIRATORY NEGATIVE: 1

## 2024-11-29 ASSESSMENT — PATIENT HEALTH QUESTIONNAIRE - PHQ9
1. LITTLE INTEREST OR PLEASURE IN DOING THINGS: SEVERAL DAYS
SUM OF ALL RESPONSES TO PHQ QUESTIONS 1-9: 1
SUM OF ALL RESPONSES TO PHQ9 QUESTIONS 1 & 2: 1
SUM OF ALL RESPONSES TO PHQ QUESTIONS 1-9: 1
2. FEELING DOWN, DEPRESSED OR HOPELESS: NOT AT ALL

## 2024-11-29 NOTE — TELEPHONE ENCOUNTER
Last seen 7/5/2024   Last labs   Last filled  11/22/23  Next appointment 11/29/2024     Lab Results   Component Value Date     11/20/2024    K 4.5 11/20/2024     (H) 11/20/2024    CO2 15 (L) 11/20/2024    BUN 30 (H) 11/20/2024    CREATININE 1.31 (H) 11/20/2024    GLUCOSE 135 (H) 11/20/2024    CALCIUM 10.0 11/20/2024    BILITOT 0.2 11/20/2024    ALKPHOS 111 11/20/2024    AST 34 11/20/2024    ALT 41 11/20/2024    LABGLOM 59 (L) 11/20/2024    GFRAA >60.0 10/11/2021    AGRATIO 1.1 10/11/2021    GLOB 3.9 10/11/2021

## 2024-11-29 NOTE — PROGRESS NOTES
Tavon Luther presents today for   Chief Complaint   Patient presents with    Medicare AWV       Is someone accompanying this pt? Yes, patients wife    Is the patient using any DME equipment during OV? Walking cane    Depression Screenin/29/2024     8:45 AM 2024     8:48 AM 2024    11:12 AM 2023    10:45 AM 2023    11:46 AM 2022     8:20 AM 2022     8:44 AM   PHQ-9 Questionaire   Little interest or pleasure in doing things 1 0 0 0 0 0 0   Feeling down, depressed, or hopeless 0 0 0 0 0 0 0   PHQ-9 Total Score 1 0 0 0 0 0 0        GUILLERMO 7-Anxiety        No data to display                   Learning Assessment:  No question data found.     Fall Risk       No data to display                   Travel Screening:    Travel Screening     No screening recorded since 24 0000       Travel History   Travel since 10/29/24    No documented travel since 10/29/24            Health Maintenance reviewed and discussed and ordered per Provider.  Transportation Needs: Unknown (2024)    PRAPARE - Transportation     Lack of Transportation (Medical): Not on file     Lack of Transportation (Non-Medical): Patient declined      Food Insecurity: Patient Declined (2024)    Hunger Vital Sign     Worried About Running Out of Food in the Last Year: Patient declined     Ran Out of Food in the Last Year: Patient declined     Financial Resource Strain: Patient Declined (2024)    Overall Financial Resource Strain (CARDIA)     Difficulty of Paying Living Expenses: Patient declined     Housing Stability: Unknown (2024)    Housing Stability Vital Sign     Unable to Pay for Housing in the Last Year: Not on file     Number of Times Moved in the Last Year: Not on file     Homeless in the Last Year: Patient declined       Did you provide resources if patient requested them? declined      Health Maintenance Due   Topic Date Due    Diabetic Alb to Cr ratio (uACR) test  Never done

## 2024-11-29 NOTE — PROGRESS NOTES
ASSESSMENT/PLAN:  1. Type 2 diabetes mellitus with stage 3a chronic kidney disease, with long-term current use of insulin (HCC)  Assessment & Plan:   Monitored by specialist- no acute findings meriting change in the plan  2. Essential (primary) hypertension  Assessment & Plan:   Chronic, at goal (stable), continue current treatment plan  Orders:  -     Basic Metabolic Panel; Future  3. Hyperlipidemia associated with type 2 diabetes mellitus (HCC)  Assessment & Plan:   Monitored by specialist- no acute findings meriting change in the plan  4. Prostate cancer (HCC)  Assessment & Plan:   Monitored by specialist- no acute findings meriting change in the plan  5. Stage 3a chronic kidney disease (HCC)  Assessment & Plan:    Pt and spouse ed re: dx.  Recheck with next lab visit in 1 month.   Orders:  -     Basic Metabolic Panel; Future        Return in about 4 months (around 3/29/2025) for DM, HTN, HLD, CKD, specialist eval, lab review.      SUBJECTIVE/OBJECTIVE:    Chief Complaint   Patient presents with    Diabetes    Hypertension    Cholesterol Problem         HPI    Tavon Luther is a 69 y.o. male presenting today for delayed  follow up of dm, htn, hld, gerd, constipation.    Pt has established with uro for eval of prostate ca.  They decided to pursue radiation rx and were referred to rad onc.      Patient does not need medication refills today.      New concerns today: pt was advised by uro that his electrolytes and kidney function were off.  It was recommended that he d/w pcp.         Review of Systems   Constitutional: Negative.    HENT: Negative.     Respiratory: Negative.     Cardiovascular: Negative.    All other systems reviewed and are negative.      Physical Exam  Vitals and nursing note reviewed.   Constitutional:       General: He is not in acute distress.     Appearance: Normal appearance.   HENT:      Head: Normocephalic and atraumatic.      Right Ear: External ear normal.      Left Ear: External ear

## 2025-01-03 ENCOUNTER — HOSPITAL ENCOUNTER (OUTPATIENT)
Facility: HOSPITAL | Age: 70
Setting detail: SPECIMEN
Discharge: HOME | End: 2025-01-06

## 2025-01-03 LAB
LABCORP SPECIMEN COLLECTION: NORMAL
LABCORP SPECIMEN COLLECTION: NORMAL

## 2025-01-03 PROCEDURE — 99001 SPECIMEN HANDLING PT-LAB: CPT

## 2025-01-04 LAB
BUN SERPL-MCNC: 29 MG/DL (ref 8–27)
BUN/CREAT SERPL: 24 (ref 10–24)
CALCIUM SERPL-MCNC: 9.6 MG/DL (ref 8.6–10.2)
CHLORIDE SERPL-SCNC: 109 MMOL/L (ref 96–106)
CO2 SERPL-SCNC: 23 MMOL/L (ref 20–29)
CREAT SERPL-MCNC: 1.19 MG/DL (ref 0.76–1.27)
EGFRCR SERPLBLD CKD-EPI 2021: 66 ML/MIN/1.73
GLUCOSE SERPL-MCNC: 95 MG/DL (ref 70–99)
POTASSIUM SERPL-SCNC: 4.5 MMOL/L (ref 3.5–5.2)
SODIUM SERPL-SCNC: 144 MMOL/L (ref 134–144)

## 2025-01-05 LAB — BACTERIA UR CULT: NO GROWTH

## 2025-01-06 LAB
BACTERIA UR CULT: NO GROWTH
BUN SERPL-MCNC: 29 MG/DL (ref 8–27)
BUN/CREAT SERPL: 24 (ref 10–24)
CALCIUM SERPL-MCNC: 9.6 MG/DL (ref 8.6–10.2)
CHLORIDE SERPL-SCNC: 109 MMOL/L (ref 96–106)
CO2 SERPL-SCNC: 23 MMOL/L (ref 20–29)
CREAT SERPL-MCNC: 1.19 MG/DL (ref 0.76–1.27)
EGFRCR SERPLBLD CKD-EPI 2021: 66 ML/MIN/1.73
GLUCOSE SERPL-MCNC: 95 MG/DL (ref 70–99)
POTASSIUM SERPL-SCNC: 4.5 MMOL/L (ref 3.5–5.2)
SODIUM SERPL-SCNC: 144 MMOL/L (ref 134–144)
SPECIMEN STATUS REPORT: NORMAL

## 2025-01-07 ENCOUNTER — TRANSCRIBE ORDERS (OUTPATIENT)
Facility: HOSPITAL | Age: 70
End: 2025-01-07

## 2025-01-07 DIAGNOSIS — C61 MALIGNANT NEOPLASM OF PROSTATE (HCC): Primary | ICD-10-CM

## 2025-01-23 RX ORDER — PRAVASTATIN SODIUM 20 MG
20 TABLET ORAL NIGHTLY
Qty: 90 TABLET | Refills: 4 | Status: SHIPPED | OUTPATIENT
Start: 2025-01-23

## 2025-01-23 NOTE — TELEPHONE ENCOUNTER
Labs:   Lab Results   Component Value Date     01/03/2025    K 4.5 01/03/2025     (H) 01/03/2025    CO2 23 01/03/2025    BUN 29 (H) 01/03/2025    CREATININE 1.19 01/03/2025    GLUCOSE 95 01/03/2025    CALCIUM 9.6 01/03/2025    BILITOT 0.2 11/20/2024    ALKPHOS 111 11/20/2024    AST 34 11/20/2024    ALT 41 11/20/2024    LABGLOM 66 01/03/2025    GFRAA >60.0 10/11/2021    AGRATIO 1.1 10/11/2021    GLOB 3.9 10/11/2021        Additional Notes:

## 2025-01-24 ENCOUNTER — HOSPITAL ENCOUNTER (OUTPATIENT)
Facility: HOSPITAL | Age: 70
Discharge: HOME | End: 2025-01-27
Attending: RADIOLOGY
Payer: MEDICARE

## 2025-01-24 ENCOUNTER — HOSPITAL ENCOUNTER (OUTPATIENT)
Facility: HOSPITAL | Age: 70
Setting detail: RECURRING SERIES
Discharge: HOME | End: 2025-01-27
Attending: RADIOLOGY
Payer: MEDICARE

## 2025-01-24 DIAGNOSIS — C61 MALIGNANT NEOPLASM OF PROSTATE (HCC): ICD-10-CM

## 2025-01-24 PROCEDURE — 77334 RADIATION TREATMENT AID(S): CPT

## 2025-01-24 PROCEDURE — 76498 UNLISTED MR PROCEDURE: CPT

## 2025-01-24 PROCEDURE — 77263 THER RADIOLOGY TX PLNG CPLX: CPT | Performed by: RADIOLOGY

## 2025-01-24 RX ORDER — DIATRIZOATE MEGLUMINE AND DIATRIZOATE SODIUM 660; 100 MG/ML; MG/ML
30 SOLUTION ORAL; RECTAL
Status: DISCONTINUED | OUTPATIENT
Start: 2025-01-24 | End: 2025-01-28 | Stop reason: HOSPADM

## 2025-01-28 ENCOUNTER — HOSPITAL ENCOUNTER (OUTPATIENT)
Facility: HOSPITAL | Age: 70
Setting detail: RECURRING SERIES
Discharge: HOME | End: 2025-01-31
Attending: RADIOLOGY
Payer: MEDICARE

## 2025-01-28 DIAGNOSIS — J30.89 OTHER ALLERGIC RHINITIS: ICD-10-CM

## 2025-01-28 PROCEDURE — 77338 DESIGN MLC DEVICE FOR IMRT: CPT

## 2025-01-28 PROCEDURE — 77301 RADIOTHERAPY DOSE PLAN IMRT: CPT

## 2025-01-28 PROCEDURE — 77300 RADIATION THERAPY DOSE PLAN: CPT

## 2025-01-30 RX ORDER — LEVOCETIRIZINE DIHYDROCHLORIDE 5 MG/1
5 TABLET, FILM COATED ORAL DAILY
Qty: 90 TABLET | Refills: 4 | Status: SHIPPED | OUTPATIENT
Start: 2025-01-30

## 2025-01-30 NOTE — TELEPHONE ENCOUNTER
Last seen 12/9/2024   Last labs   Last filled  11/22/23  Next appointment 3/28/2025     Lab Results   Component Value Date     01/03/2025    K 4.5 01/03/2025     (H) 01/03/2025    CO2 23 01/03/2025    BUN 29 (H) 01/03/2025    CREATININE 1.19 01/03/2025    GLUCOSE 95 01/03/2025    CALCIUM 9.6 01/03/2025    BILITOT 0.2 11/20/2024    ALKPHOS 111 11/20/2024    AST 34 11/20/2024    ALT 41 11/20/2024    LABGLOM 66 01/03/2025    GFRAA >60.0 10/11/2021    AGRATIO 1.1 10/11/2021    GLOB 3.9 10/11/2021

## 2025-02-03 ENCOUNTER — HOSPITAL ENCOUNTER (OUTPATIENT)
Facility: HOSPITAL | Age: 70
Setting detail: RECURRING SERIES
Discharge: HOME | End: 2025-02-06
Attending: RADIOLOGY
Payer: MEDICARE

## 2025-02-03 LAB
RAD ONC ARIA COURSE FIRST TREATMENT DATE: NORMAL
RAD ONC ARIA COURSE ID: NORMAL
RAD ONC ARIA COURSE INTENT: NORMAL
RAD ONC ARIA COURSE LAST TREATMENT DATE: NORMAL
RAD ONC ARIA COURSE SESSION NUMBER: 1
RAD ONC ARIA COURSE START DATE: NORMAL
RAD ONC ARIA COURSE TREATMENT ELAPSED DAYS: 0
RAD ONC ARIA PLAN FRACTIONS TREATED TO DATE: 1
RAD ONC ARIA PLAN ID: NORMAL
RAD ONC ARIA PLAN PRESCRIBED DOSE PER FRACTION: 2.5 GY
RAD ONC ARIA PLAN PRIMARY REFERENCE POINT: NORMAL
RAD ONC ARIA PLAN TOTAL FRACTIONS PRESCRIBED: 28
RAD ONC ARIA PLAN TOTAL PRESCRIBED DOSE: 7000 CGY
RAD ONC ARIA REFERENCE POINT DOSAGE GIVEN TO DATE: 2.5 GY
RAD ONC ARIA REFERENCE POINT ID: NORMAL
RAD ONC ARIA REFERENCE POINT SESSION DOSAGE GIVEN: 2.5 GY

## 2025-02-03 PROCEDURE — 77427 RADIATION TX MANAGEMENT X5: CPT | Performed by: RADIOLOGY

## 2025-02-03 PROCEDURE — 77385 HC NTSTY MODUL RAD TX DLVR SMPL: CPT

## 2025-02-03 PROCEDURE — 77014 CHG CT GUIDANCE RADIATION THERAPY FLDS PLACEMENT: CPT | Performed by: RADIOLOGY

## 2025-02-04 ENCOUNTER — HOSPITAL ENCOUNTER (OUTPATIENT)
Facility: HOSPITAL | Age: 70
Setting detail: RECURRING SERIES
Discharge: HOME | End: 2025-02-07
Attending: RADIOLOGY
Payer: MEDICARE

## 2025-02-04 LAB
RAD ONC ARIA COURSE FIRST TREATMENT DATE: NORMAL
RAD ONC ARIA COURSE ID: NORMAL
RAD ONC ARIA COURSE INTENT: NORMAL
RAD ONC ARIA COURSE LAST TREATMENT DATE: NORMAL
RAD ONC ARIA COURSE SESSION NUMBER: 2
RAD ONC ARIA COURSE START DATE: NORMAL
RAD ONC ARIA COURSE TREATMENT ELAPSED DAYS: 1
RAD ONC ARIA PLAN FRACTIONS TREATED TO DATE: 2
RAD ONC ARIA PLAN ID: NORMAL
RAD ONC ARIA PLAN PRESCRIBED DOSE PER FRACTION: 2.5 GY
RAD ONC ARIA PLAN PRIMARY REFERENCE POINT: NORMAL
RAD ONC ARIA PLAN TOTAL FRACTIONS PRESCRIBED: 28
RAD ONC ARIA PLAN TOTAL PRESCRIBED DOSE: 7000 CGY
RAD ONC ARIA REFERENCE POINT DOSAGE GIVEN TO DATE: 5 GY
RAD ONC ARIA REFERENCE POINT ID: NORMAL
RAD ONC ARIA REFERENCE POINT SESSION DOSAGE GIVEN: 2.5 GY

## 2025-02-04 PROCEDURE — 77014 CHG CT GUIDANCE RADIATION THERAPY FLDS PLACEMENT: CPT | Performed by: RADIOLOGY

## 2025-02-04 PROCEDURE — 77385 HC NTSTY MODUL RAD TX DLVR SMPL: CPT

## 2025-02-05 ENCOUNTER — HOSPITAL ENCOUNTER (OUTPATIENT)
Facility: HOSPITAL | Age: 70
Setting detail: RECURRING SERIES
Discharge: HOME | End: 2025-02-08
Attending: RADIOLOGY
Payer: MEDICARE

## 2025-02-05 LAB
RAD ONC ARIA COURSE FIRST TREATMENT DATE: NORMAL
RAD ONC ARIA COURSE ID: NORMAL
RAD ONC ARIA COURSE INTENT: NORMAL
RAD ONC ARIA COURSE LAST TREATMENT DATE: NORMAL
RAD ONC ARIA COURSE SESSION NUMBER: 3
RAD ONC ARIA COURSE START DATE: NORMAL
RAD ONC ARIA COURSE TREATMENT ELAPSED DAYS: 2
RAD ONC ARIA PLAN FRACTIONS TREATED TO DATE: 3
RAD ONC ARIA PLAN ID: NORMAL
RAD ONC ARIA PLAN PRESCRIBED DOSE PER FRACTION: 2.5 GY
RAD ONC ARIA PLAN PRIMARY REFERENCE POINT: NORMAL
RAD ONC ARIA PLAN TOTAL FRACTIONS PRESCRIBED: 28
RAD ONC ARIA PLAN TOTAL PRESCRIBED DOSE: 7000 CGY
RAD ONC ARIA REFERENCE POINT DOSAGE GIVEN TO DATE: 7.5 GY
RAD ONC ARIA REFERENCE POINT ID: NORMAL
RAD ONC ARIA REFERENCE POINT SESSION DOSAGE GIVEN: 2.5 GY

## 2025-02-05 PROCEDURE — 77014 CHG CT GUIDANCE RADIATION THERAPY FLDS PLACEMENT: CPT | Performed by: RADIOLOGY

## 2025-02-05 PROCEDURE — 77385 HC NTSTY MODUL RAD TX DLVR SMPL: CPT

## 2025-02-06 ENCOUNTER — HOSPITAL ENCOUNTER (OUTPATIENT)
Facility: HOSPITAL | Age: 70
Setting detail: RECURRING SERIES
Discharge: HOME | End: 2025-02-09
Attending: RADIOLOGY
Payer: MEDICARE

## 2025-02-06 LAB
RAD ONC ARIA COURSE FIRST TREATMENT DATE: NORMAL
RAD ONC ARIA COURSE ID: NORMAL
RAD ONC ARIA COURSE INTENT: NORMAL
RAD ONC ARIA COURSE LAST TREATMENT DATE: NORMAL
RAD ONC ARIA COURSE SESSION NUMBER: 4
RAD ONC ARIA COURSE START DATE: NORMAL
RAD ONC ARIA COURSE TREATMENT ELAPSED DAYS: 3
RAD ONC ARIA PLAN FRACTIONS TREATED TO DATE: 4
RAD ONC ARIA PLAN ID: NORMAL
RAD ONC ARIA PLAN PRESCRIBED DOSE PER FRACTION: 2.5 GY
RAD ONC ARIA PLAN PRIMARY REFERENCE POINT: NORMAL
RAD ONC ARIA PLAN TOTAL FRACTIONS PRESCRIBED: 28
RAD ONC ARIA PLAN TOTAL PRESCRIBED DOSE: 7000 CGY
RAD ONC ARIA REFERENCE POINT DOSAGE GIVEN TO DATE: 10 GY
RAD ONC ARIA REFERENCE POINT ID: NORMAL
RAD ONC ARIA REFERENCE POINT SESSION DOSAGE GIVEN: 2.5 GY

## 2025-02-06 PROCEDURE — 77014 CHG CT GUIDANCE RADIATION THERAPY FLDS PLACEMENT: CPT | Performed by: RADIOLOGY

## 2025-02-06 PROCEDURE — 77385 HC NTSTY MODUL RAD TX DLVR SMPL: CPT

## 2025-02-07 ENCOUNTER — HOSPITAL ENCOUNTER (OUTPATIENT)
Facility: HOSPITAL | Age: 70
Setting detail: RECURRING SERIES
Discharge: HOME | End: 2025-02-10
Attending: RADIOLOGY
Payer: MEDICARE

## 2025-02-07 LAB
RAD ONC ARIA COURSE FIRST TREATMENT DATE: NORMAL
RAD ONC ARIA COURSE ID: NORMAL
RAD ONC ARIA COURSE INTENT: NORMAL
RAD ONC ARIA COURSE LAST TREATMENT DATE: NORMAL
RAD ONC ARIA COURSE SESSION NUMBER: 5
RAD ONC ARIA COURSE START DATE: NORMAL
RAD ONC ARIA COURSE TREATMENT ELAPSED DAYS: 4
RAD ONC ARIA PLAN FRACTIONS TREATED TO DATE: 5
RAD ONC ARIA PLAN ID: NORMAL
RAD ONC ARIA PLAN PRESCRIBED DOSE PER FRACTION: 2.5 GY
RAD ONC ARIA PLAN PRIMARY REFERENCE POINT: NORMAL
RAD ONC ARIA PLAN TOTAL FRACTIONS PRESCRIBED: 28
RAD ONC ARIA PLAN TOTAL PRESCRIBED DOSE: 7000 CGY
RAD ONC ARIA REFERENCE POINT DOSAGE GIVEN TO DATE: 12.5 GY
RAD ONC ARIA REFERENCE POINT ID: NORMAL
RAD ONC ARIA REFERENCE POINT SESSION DOSAGE GIVEN: 2.5 GY

## 2025-02-07 PROCEDURE — 77014 CHG CT GUIDANCE RADIATION THERAPY FLDS PLACEMENT: CPT | Performed by: RADIOLOGY

## 2025-02-07 PROCEDURE — 77385 HC NTSTY MODUL RAD TX DLVR SMPL: CPT

## 2025-02-07 PROCEDURE — 77336 RADIATION PHYSICS CONSULT: CPT

## 2025-02-10 ENCOUNTER — HOSPITAL ENCOUNTER (OUTPATIENT)
Facility: HOSPITAL | Age: 70
Setting detail: RECURRING SERIES
Discharge: HOME | End: 2025-02-13
Attending: RADIOLOGY
Payer: MEDICARE

## 2025-02-10 LAB
RAD ONC ARIA COURSE FIRST TREATMENT DATE: NORMAL
RAD ONC ARIA COURSE ID: NORMAL
RAD ONC ARIA COURSE INTENT: NORMAL
RAD ONC ARIA COURSE LAST TREATMENT DATE: NORMAL
RAD ONC ARIA COURSE SESSION NUMBER: 6
RAD ONC ARIA COURSE START DATE: NORMAL
RAD ONC ARIA COURSE TREATMENT ELAPSED DAYS: 7
RAD ONC ARIA PLAN FRACTIONS TREATED TO DATE: 6
RAD ONC ARIA PLAN ID: NORMAL
RAD ONC ARIA PLAN PRESCRIBED DOSE PER FRACTION: 2.5 GY
RAD ONC ARIA PLAN PRIMARY REFERENCE POINT: NORMAL
RAD ONC ARIA PLAN TOTAL FRACTIONS PRESCRIBED: 28
RAD ONC ARIA PLAN TOTAL PRESCRIBED DOSE: 7000 CGY
RAD ONC ARIA REFERENCE POINT DOSAGE GIVEN TO DATE: 15 GY
RAD ONC ARIA REFERENCE POINT ID: NORMAL
RAD ONC ARIA REFERENCE POINT SESSION DOSAGE GIVEN: 2.5 GY

## 2025-02-10 PROCEDURE — 77385 HC NTSTY MODUL RAD TX DLVR SMPL: CPT

## 2025-02-10 PROCEDURE — 77014 CHG CT GUIDANCE RADIATION THERAPY FLDS PLACEMENT: CPT | Performed by: RADIOLOGY

## 2025-02-11 ENCOUNTER — HOSPITAL ENCOUNTER (OUTPATIENT)
Facility: HOSPITAL | Age: 70
Setting detail: RECURRING SERIES
Discharge: HOME | End: 2025-02-14
Attending: RADIOLOGY
Payer: MEDICARE

## 2025-02-11 LAB
RAD ONC ARIA COURSE FIRST TREATMENT DATE: NORMAL
RAD ONC ARIA COURSE ID: NORMAL
RAD ONC ARIA COURSE INTENT: NORMAL
RAD ONC ARIA COURSE LAST TREATMENT DATE: NORMAL
RAD ONC ARIA COURSE SESSION NUMBER: 7
RAD ONC ARIA COURSE START DATE: NORMAL
RAD ONC ARIA COURSE TREATMENT ELAPSED DAYS: 8
RAD ONC ARIA PLAN FRACTIONS TREATED TO DATE: 7
RAD ONC ARIA PLAN ID: NORMAL
RAD ONC ARIA PLAN PRESCRIBED DOSE PER FRACTION: 2.5 GY
RAD ONC ARIA PLAN PRIMARY REFERENCE POINT: NORMAL
RAD ONC ARIA PLAN TOTAL FRACTIONS PRESCRIBED: 28
RAD ONC ARIA PLAN TOTAL PRESCRIBED DOSE: 7000 CGY
RAD ONC ARIA REFERENCE POINT DOSAGE GIVEN TO DATE: 17.5 GY
RAD ONC ARIA REFERENCE POINT ID: NORMAL
RAD ONC ARIA REFERENCE POINT SESSION DOSAGE GIVEN: 2.5 GY

## 2025-02-11 PROCEDURE — 77014 CHG CT GUIDANCE RADIATION THERAPY FLDS PLACEMENT: CPT | Performed by: RADIOLOGY

## 2025-02-11 PROCEDURE — 77385 HC NTSTY MODUL RAD TX DLVR SMPL: CPT

## 2025-02-12 ENCOUNTER — HOSPITAL ENCOUNTER (OUTPATIENT)
Facility: HOSPITAL | Age: 70
Setting detail: RECURRING SERIES
Discharge: HOME | End: 2025-02-15
Attending: RADIOLOGY
Payer: MEDICARE

## 2025-02-12 LAB
RAD ONC ARIA COURSE FIRST TREATMENT DATE: NORMAL
RAD ONC ARIA COURSE ID: NORMAL
RAD ONC ARIA COURSE INTENT: NORMAL
RAD ONC ARIA COURSE LAST TREATMENT DATE: NORMAL
RAD ONC ARIA COURSE SESSION NUMBER: 8
RAD ONC ARIA COURSE START DATE: NORMAL
RAD ONC ARIA COURSE TREATMENT ELAPSED DAYS: 9
RAD ONC ARIA PLAN FRACTIONS TREATED TO DATE: 8
RAD ONC ARIA PLAN ID: NORMAL
RAD ONC ARIA PLAN PRESCRIBED DOSE PER FRACTION: 2.5 GY
RAD ONC ARIA PLAN PRIMARY REFERENCE POINT: NORMAL
RAD ONC ARIA PLAN TOTAL FRACTIONS PRESCRIBED: 28
RAD ONC ARIA PLAN TOTAL PRESCRIBED DOSE: 7000 CGY
RAD ONC ARIA REFERENCE POINT DOSAGE GIVEN TO DATE: 20 GY
RAD ONC ARIA REFERENCE POINT ID: NORMAL
RAD ONC ARIA REFERENCE POINT SESSION DOSAGE GIVEN: 2.5 GY

## 2025-02-12 PROCEDURE — 77385 HC NTSTY MODUL RAD TX DLVR SMPL: CPT

## 2025-02-12 PROCEDURE — 77014 CHG CT GUIDANCE RADIATION THERAPY FLDS PLACEMENT: CPT | Performed by: RADIOLOGY

## 2025-02-13 ENCOUNTER — HOSPITAL ENCOUNTER (OUTPATIENT)
Facility: HOSPITAL | Age: 70
Setting detail: RECURRING SERIES
Discharge: HOME | End: 2025-02-16
Attending: RADIOLOGY
Payer: MEDICARE

## 2025-02-13 LAB
RAD ONC ARIA COURSE FIRST TREATMENT DATE: NORMAL
RAD ONC ARIA COURSE ID: NORMAL
RAD ONC ARIA COURSE INTENT: NORMAL
RAD ONC ARIA COURSE LAST TREATMENT DATE: NORMAL
RAD ONC ARIA COURSE SESSION NUMBER: 9
RAD ONC ARIA COURSE START DATE: NORMAL
RAD ONC ARIA COURSE TREATMENT ELAPSED DAYS: 10
RAD ONC ARIA PLAN FRACTIONS TREATED TO DATE: 9
RAD ONC ARIA PLAN ID: NORMAL
RAD ONC ARIA PLAN PRESCRIBED DOSE PER FRACTION: 2.5 GY
RAD ONC ARIA PLAN PRIMARY REFERENCE POINT: NORMAL
RAD ONC ARIA PLAN TOTAL FRACTIONS PRESCRIBED: 28
RAD ONC ARIA PLAN TOTAL PRESCRIBED DOSE: 7000 CGY
RAD ONC ARIA REFERENCE POINT DOSAGE GIVEN TO DATE: 22.5 GY
RAD ONC ARIA REFERENCE POINT ID: NORMAL
RAD ONC ARIA REFERENCE POINT SESSION DOSAGE GIVEN: 2.5 GY

## 2025-02-13 PROCEDURE — 77385 HC NTSTY MODUL RAD TX DLVR SMPL: CPT

## 2025-02-14 ENCOUNTER — HOSPITAL ENCOUNTER (OUTPATIENT)
Facility: HOSPITAL | Age: 70
Setting detail: RECURRING SERIES
Discharge: HOME | End: 2025-02-17
Attending: RADIOLOGY
Payer: MEDICARE

## 2025-02-14 LAB
RAD ONC ARIA COURSE FIRST TREATMENT DATE: NORMAL
RAD ONC ARIA COURSE ID: NORMAL
RAD ONC ARIA COURSE INTENT: NORMAL
RAD ONC ARIA COURSE LAST TREATMENT DATE: NORMAL
RAD ONC ARIA COURSE SESSION NUMBER: 10
RAD ONC ARIA COURSE START DATE: NORMAL
RAD ONC ARIA COURSE TREATMENT ELAPSED DAYS: 11
RAD ONC ARIA PLAN FRACTIONS TREATED TO DATE: 10
RAD ONC ARIA PLAN ID: NORMAL
RAD ONC ARIA PLAN PRESCRIBED DOSE PER FRACTION: 2.5 GY
RAD ONC ARIA PLAN PRIMARY REFERENCE POINT: NORMAL
RAD ONC ARIA PLAN TOTAL FRACTIONS PRESCRIBED: 28
RAD ONC ARIA PLAN TOTAL PRESCRIBED DOSE: 7000 CGY
RAD ONC ARIA REFERENCE POINT DOSAGE GIVEN TO DATE: 25 GY
RAD ONC ARIA REFERENCE POINT ID: NORMAL
RAD ONC ARIA REFERENCE POINT SESSION DOSAGE GIVEN: 2.5 GY

## 2025-02-14 PROCEDURE — 77385 HC NTSTY MODUL RAD TX DLVR SMPL: CPT

## 2025-02-17 ENCOUNTER — HOSPITAL ENCOUNTER (OUTPATIENT)
Facility: HOSPITAL | Age: 70
Setting detail: RECURRING SERIES
Discharge: HOME | End: 2025-02-20
Attending: RADIOLOGY
Payer: MEDICARE

## 2025-02-17 LAB
RAD ONC ARIA COURSE FIRST TREATMENT DATE: NORMAL
RAD ONC ARIA COURSE ID: NORMAL
RAD ONC ARIA COURSE INTENT: NORMAL
RAD ONC ARIA COURSE LAST TREATMENT DATE: NORMAL
RAD ONC ARIA COURSE SESSION NUMBER: 11
RAD ONC ARIA COURSE START DATE: NORMAL
RAD ONC ARIA COURSE TREATMENT ELAPSED DAYS: 14
RAD ONC ARIA PLAN FRACTIONS TREATED TO DATE: 11
RAD ONC ARIA PLAN ID: NORMAL
RAD ONC ARIA PLAN PRESCRIBED DOSE PER FRACTION: 2.5 GY
RAD ONC ARIA PLAN PRIMARY REFERENCE POINT: NORMAL
RAD ONC ARIA PLAN TOTAL FRACTIONS PRESCRIBED: 28
RAD ONC ARIA PLAN TOTAL PRESCRIBED DOSE: 7000 CGY
RAD ONC ARIA REFERENCE POINT DOSAGE GIVEN TO DATE: 27.5 GY
RAD ONC ARIA REFERENCE POINT ID: NORMAL
RAD ONC ARIA REFERENCE POINT SESSION DOSAGE GIVEN: 2.5 GY

## 2025-02-17 PROCEDURE — 77014 CHG CT GUIDANCE RADIATION THERAPY FLDS PLACEMENT: CPT | Performed by: RADIOLOGY

## 2025-02-17 PROCEDURE — 77427 RADIATION TX MANAGEMENT X5: CPT | Performed by: RADIOLOGY

## 2025-02-17 PROCEDURE — 77385 HC NTSTY MODUL RAD TX DLVR SMPL: CPT

## 2025-02-18 ENCOUNTER — HOSPITAL ENCOUNTER (OUTPATIENT)
Facility: HOSPITAL | Age: 70
Setting detail: RECURRING SERIES
Discharge: HOME | End: 2025-02-21
Attending: RADIOLOGY
Payer: MEDICARE

## 2025-02-18 LAB
RAD ONC ARIA COURSE FIRST TREATMENT DATE: NORMAL
RAD ONC ARIA COURSE ID: NORMAL
RAD ONC ARIA COURSE INTENT: NORMAL
RAD ONC ARIA COURSE LAST TREATMENT DATE: NORMAL
RAD ONC ARIA COURSE SESSION NUMBER: 12
RAD ONC ARIA COURSE START DATE: NORMAL
RAD ONC ARIA COURSE TREATMENT ELAPSED DAYS: 15
RAD ONC ARIA PLAN FRACTIONS TREATED TO DATE: 12
RAD ONC ARIA PLAN ID: NORMAL
RAD ONC ARIA PLAN PRESCRIBED DOSE PER FRACTION: 2.5 GY
RAD ONC ARIA PLAN PRIMARY REFERENCE POINT: NORMAL
RAD ONC ARIA PLAN TOTAL FRACTIONS PRESCRIBED: 28
RAD ONC ARIA PLAN TOTAL PRESCRIBED DOSE: 7000 CGY
RAD ONC ARIA REFERENCE POINT DOSAGE GIVEN TO DATE: 30 GY
RAD ONC ARIA REFERENCE POINT ID: NORMAL
RAD ONC ARIA REFERENCE POINT SESSION DOSAGE GIVEN: 2.5 GY

## 2025-02-18 PROCEDURE — 77014 CHG CT GUIDANCE RADIATION THERAPY FLDS PLACEMENT: CPT | Performed by: RADIOLOGY

## 2025-02-18 PROCEDURE — 77385 HC NTSTY MODUL RAD TX DLVR SMPL: CPT

## 2025-02-19 ENCOUNTER — HOSPITAL ENCOUNTER (OUTPATIENT)
Facility: HOSPITAL | Age: 70
Setting detail: RECURRING SERIES
Discharge: HOME | End: 2025-02-22
Attending: RADIOLOGY
Payer: MEDICARE

## 2025-02-19 LAB
RAD ONC ARIA COURSE FIRST TREATMENT DATE: NORMAL
RAD ONC ARIA COURSE ID: NORMAL
RAD ONC ARIA COURSE INTENT: NORMAL
RAD ONC ARIA COURSE LAST TREATMENT DATE: NORMAL
RAD ONC ARIA COURSE SESSION NUMBER: 13
RAD ONC ARIA COURSE START DATE: NORMAL
RAD ONC ARIA COURSE TREATMENT ELAPSED DAYS: 16
RAD ONC ARIA PLAN FRACTIONS TREATED TO DATE: 13
RAD ONC ARIA PLAN ID: NORMAL
RAD ONC ARIA PLAN PRESCRIBED DOSE PER FRACTION: 2.5 GY
RAD ONC ARIA PLAN PRIMARY REFERENCE POINT: NORMAL
RAD ONC ARIA PLAN TOTAL FRACTIONS PRESCRIBED: 28
RAD ONC ARIA PLAN TOTAL PRESCRIBED DOSE: 7000 CGY
RAD ONC ARIA REFERENCE POINT DOSAGE GIVEN TO DATE: 32.5 GY
RAD ONC ARIA REFERENCE POINT ID: NORMAL
RAD ONC ARIA REFERENCE POINT SESSION DOSAGE GIVEN: 2.5 GY

## 2025-02-19 PROCEDURE — 77385 HC NTSTY MODUL RAD TX DLVR SMPL: CPT

## 2025-02-24 ENCOUNTER — HOSPITAL ENCOUNTER (OUTPATIENT)
Facility: HOSPITAL | Age: 70
Setting detail: RECURRING SERIES
Discharge: HOME | End: 2025-02-27
Attending: RADIOLOGY
Payer: MEDICARE

## 2025-02-24 LAB
RAD ONC ARIA COURSE FIRST TREATMENT DATE: NORMAL
RAD ONC ARIA COURSE ID: NORMAL
RAD ONC ARIA COURSE INTENT: NORMAL
RAD ONC ARIA COURSE LAST TREATMENT DATE: NORMAL
RAD ONC ARIA COURSE SESSION NUMBER: 14
RAD ONC ARIA COURSE START DATE: NORMAL
RAD ONC ARIA COURSE TREATMENT ELAPSED DAYS: 21
RAD ONC ARIA PLAN FRACTIONS TREATED TO DATE: 14
RAD ONC ARIA PLAN ID: NORMAL
RAD ONC ARIA PLAN PRESCRIBED DOSE PER FRACTION: 2.5 GY
RAD ONC ARIA PLAN PRIMARY REFERENCE POINT: NORMAL
RAD ONC ARIA PLAN TOTAL FRACTIONS PRESCRIBED: 28
RAD ONC ARIA PLAN TOTAL PRESCRIBED DOSE: 7000 CGY
RAD ONC ARIA REFERENCE POINT DOSAGE GIVEN TO DATE: 35 GY
RAD ONC ARIA REFERENCE POINT ID: NORMAL
RAD ONC ARIA REFERENCE POINT SESSION DOSAGE GIVEN: 2.5 GY

## 2025-02-24 PROCEDURE — 77014 CHG CT GUIDANCE RADIATION THERAPY FLDS PLACEMENT: CPT | Performed by: RADIOLOGY

## 2025-02-24 PROCEDURE — 77385 HC NTSTY MODUL RAD TX DLVR SMPL: CPT

## 2025-02-25 ENCOUNTER — HOSPITAL ENCOUNTER (OUTPATIENT)
Facility: HOSPITAL | Age: 70
Setting detail: RECURRING SERIES
Discharge: HOME | End: 2025-02-28
Attending: RADIOLOGY
Payer: MEDICARE

## 2025-02-25 LAB
RAD ONC ARIA COURSE FIRST TREATMENT DATE: NORMAL
RAD ONC ARIA COURSE ID: NORMAL
RAD ONC ARIA COURSE INTENT: NORMAL
RAD ONC ARIA COURSE LAST TREATMENT DATE: NORMAL
RAD ONC ARIA COURSE SESSION NUMBER: 15
RAD ONC ARIA COURSE START DATE: NORMAL
RAD ONC ARIA COURSE TREATMENT ELAPSED DAYS: 22
RAD ONC ARIA PLAN FRACTIONS TREATED TO DATE: 15
RAD ONC ARIA PLAN ID: NORMAL
RAD ONC ARIA PLAN PRESCRIBED DOSE PER FRACTION: 2.5 GY
RAD ONC ARIA PLAN PRIMARY REFERENCE POINT: NORMAL
RAD ONC ARIA PLAN TOTAL FRACTIONS PRESCRIBED: 28
RAD ONC ARIA PLAN TOTAL PRESCRIBED DOSE: 7000 CGY
RAD ONC ARIA REFERENCE POINT DOSAGE GIVEN TO DATE: 37.5 GY
RAD ONC ARIA REFERENCE POINT ID: NORMAL
RAD ONC ARIA REFERENCE POINT SESSION DOSAGE GIVEN: 2.5 GY

## 2025-02-25 PROCEDURE — 77385 HC NTSTY MODUL RAD TX DLVR SMPL: CPT

## 2025-02-25 PROCEDURE — 77014 CHG CT GUIDANCE RADIATION THERAPY FLDS PLACEMENT: CPT | Performed by: RADIOLOGY

## 2025-02-26 ENCOUNTER — HOSPITAL ENCOUNTER (OUTPATIENT)
Facility: HOSPITAL | Age: 70
Setting detail: RECURRING SERIES
Discharge: HOME | End: 2025-03-01
Attending: RADIOLOGY
Payer: MEDICARE

## 2025-02-26 LAB
RAD ONC ARIA COURSE FIRST TREATMENT DATE: NORMAL
RAD ONC ARIA COURSE ID: NORMAL
RAD ONC ARIA COURSE INTENT: NORMAL
RAD ONC ARIA COURSE LAST TREATMENT DATE: NORMAL
RAD ONC ARIA COURSE SESSION NUMBER: 16
RAD ONC ARIA COURSE START DATE: NORMAL
RAD ONC ARIA COURSE TREATMENT ELAPSED DAYS: 23
RAD ONC ARIA PLAN FRACTIONS TREATED TO DATE: 16
RAD ONC ARIA PLAN ID: NORMAL
RAD ONC ARIA PLAN PRESCRIBED DOSE PER FRACTION: 2.5 GY
RAD ONC ARIA PLAN PRIMARY REFERENCE POINT: NORMAL
RAD ONC ARIA PLAN TOTAL FRACTIONS PRESCRIBED: 28
RAD ONC ARIA PLAN TOTAL PRESCRIBED DOSE: 7000 CGY
RAD ONC ARIA REFERENCE POINT DOSAGE GIVEN TO DATE: 40 GY
RAD ONC ARIA REFERENCE POINT ID: NORMAL
RAD ONC ARIA REFERENCE POINT SESSION DOSAGE GIVEN: 2.5 GY

## 2025-02-26 PROCEDURE — 77336 RADIATION PHYSICS CONSULT: CPT

## 2025-02-26 PROCEDURE — 77014 CHG CT GUIDANCE RADIATION THERAPY FLDS PLACEMENT: CPT | Performed by: RADIOLOGY

## 2025-02-26 PROCEDURE — 77385 HC NTSTY MODUL RAD TX DLVR SMPL: CPT

## 2025-02-27 ENCOUNTER — HOSPITAL ENCOUNTER (OUTPATIENT)
Facility: HOSPITAL | Age: 70
Setting detail: RECURRING SERIES
End: 2025-02-27
Attending: RADIOLOGY
Payer: MEDICARE

## 2025-02-27 LAB
RAD ONC ARIA COURSE FIRST TREATMENT DATE: NORMAL
RAD ONC ARIA COURSE ID: NORMAL
RAD ONC ARIA COURSE INTENT: NORMAL
RAD ONC ARIA COURSE LAST TREATMENT DATE: NORMAL
RAD ONC ARIA COURSE SESSION NUMBER: 17
RAD ONC ARIA COURSE START DATE: NORMAL
RAD ONC ARIA COURSE TREATMENT ELAPSED DAYS: 24
RAD ONC ARIA PLAN FRACTIONS TREATED TO DATE: 17
RAD ONC ARIA PLAN ID: NORMAL
RAD ONC ARIA PLAN PRESCRIBED DOSE PER FRACTION: 2.5 GY
RAD ONC ARIA PLAN PRIMARY REFERENCE POINT: NORMAL
RAD ONC ARIA PLAN TOTAL FRACTIONS PRESCRIBED: 28
RAD ONC ARIA PLAN TOTAL PRESCRIBED DOSE: 7000 CGY
RAD ONC ARIA REFERENCE POINT DOSAGE GIVEN TO DATE: 42.5 GY
RAD ONC ARIA REFERENCE POINT ID: NORMAL
RAD ONC ARIA REFERENCE POINT SESSION DOSAGE GIVEN: 2.5 GY

## 2025-02-27 PROCEDURE — 77385 HC NTSTY MODUL RAD TX DLVR SMPL: CPT

## 2025-02-28 ENCOUNTER — HOSPITAL ENCOUNTER (OUTPATIENT)
Facility: HOSPITAL | Age: 70
Setting detail: RECURRING SERIES
End: 2025-02-28
Attending: RADIOLOGY
Payer: MEDICARE

## 2025-02-28 LAB
RAD ONC ARIA COURSE FIRST TREATMENT DATE: NORMAL
RAD ONC ARIA COURSE ID: NORMAL
RAD ONC ARIA COURSE INTENT: NORMAL
RAD ONC ARIA COURSE LAST TREATMENT DATE: NORMAL
RAD ONC ARIA COURSE SESSION NUMBER: 18
RAD ONC ARIA COURSE START DATE: NORMAL
RAD ONC ARIA COURSE TREATMENT ELAPSED DAYS: 25
RAD ONC ARIA PLAN FRACTIONS TREATED TO DATE: 18
RAD ONC ARIA PLAN ID: NORMAL
RAD ONC ARIA PLAN PRESCRIBED DOSE PER FRACTION: 2.5 GY
RAD ONC ARIA PLAN PRIMARY REFERENCE POINT: NORMAL
RAD ONC ARIA PLAN TOTAL FRACTIONS PRESCRIBED: 28
RAD ONC ARIA PLAN TOTAL PRESCRIBED DOSE: 7000 CGY
RAD ONC ARIA REFERENCE POINT DOSAGE GIVEN TO DATE: 45 GY
RAD ONC ARIA REFERENCE POINT ID: NORMAL
RAD ONC ARIA REFERENCE POINT SESSION DOSAGE GIVEN: 2.5 GY

## 2025-02-28 PROCEDURE — 77385 HC NTSTY MODUL RAD TX DLVR SMPL: CPT

## 2025-02-28 PROCEDURE — 77014 CHG CT GUIDANCE RADIATION THERAPY FLDS PLACEMENT: CPT | Performed by: RADIOLOGY

## 2025-03-03 ENCOUNTER — HOSPITAL ENCOUNTER (OUTPATIENT)
Facility: HOSPITAL | Age: 70
Setting detail: RECURRING SERIES
Discharge: HOME | End: 2025-03-06
Attending: RADIOLOGY
Payer: MEDICARE

## 2025-03-03 LAB
RAD ONC ARIA COURSE FIRST TREATMENT DATE: NORMAL
RAD ONC ARIA COURSE ID: NORMAL
RAD ONC ARIA COURSE INTENT: NORMAL
RAD ONC ARIA COURSE LAST TREATMENT DATE: NORMAL
RAD ONC ARIA COURSE SESSION NUMBER: 19
RAD ONC ARIA COURSE START DATE: NORMAL
RAD ONC ARIA COURSE TREATMENT ELAPSED DAYS: 28
RAD ONC ARIA PLAN FRACTIONS TREATED TO DATE: 19
RAD ONC ARIA PLAN ID: NORMAL
RAD ONC ARIA PLAN PRESCRIBED DOSE PER FRACTION: 2.5 GY
RAD ONC ARIA PLAN PRIMARY REFERENCE POINT: NORMAL
RAD ONC ARIA PLAN TOTAL FRACTIONS PRESCRIBED: 28
RAD ONC ARIA PLAN TOTAL PRESCRIBED DOSE: 7000 CGY
RAD ONC ARIA REFERENCE POINT DOSAGE GIVEN TO DATE: 47.5 GY
RAD ONC ARIA REFERENCE POINT ID: NORMAL
RAD ONC ARIA REFERENCE POINT SESSION DOSAGE GIVEN: 2.5 GY

## 2025-03-03 PROCEDURE — 77385 HC NTSTY MODUL RAD TX DLVR SMPL: CPT

## 2025-03-03 PROCEDURE — 77014 CHG CT GUIDANCE RADIATION THERAPY FLDS PLACEMENT: CPT | Performed by: RADIOLOGY

## 2025-03-04 ENCOUNTER — HOSPITAL ENCOUNTER (OUTPATIENT)
Facility: HOSPITAL | Age: 70
Setting detail: RECURRING SERIES
Discharge: HOME | End: 2025-03-07
Attending: RADIOLOGY
Payer: MEDICARE

## 2025-03-04 LAB
RAD ONC ARIA COURSE FIRST TREATMENT DATE: NORMAL
RAD ONC ARIA COURSE ID: NORMAL
RAD ONC ARIA COURSE INTENT: NORMAL
RAD ONC ARIA COURSE LAST TREATMENT DATE: NORMAL
RAD ONC ARIA COURSE SESSION NUMBER: 20
RAD ONC ARIA COURSE START DATE: NORMAL
RAD ONC ARIA COURSE TREATMENT ELAPSED DAYS: 29
RAD ONC ARIA PLAN FRACTIONS TREATED TO DATE: 20
RAD ONC ARIA PLAN ID: NORMAL
RAD ONC ARIA PLAN PRESCRIBED DOSE PER FRACTION: 2.5 GY
RAD ONC ARIA PLAN PRIMARY REFERENCE POINT: NORMAL
RAD ONC ARIA PLAN TOTAL FRACTIONS PRESCRIBED: 28
RAD ONC ARIA PLAN TOTAL PRESCRIBED DOSE: 7000 CGY
RAD ONC ARIA REFERENCE POINT DOSAGE GIVEN TO DATE: 50 GY
RAD ONC ARIA REFERENCE POINT ID: NORMAL
RAD ONC ARIA REFERENCE POINT SESSION DOSAGE GIVEN: 2.5 GY

## 2025-03-04 PROCEDURE — 77014 CHG CT GUIDANCE RADIATION THERAPY FLDS PLACEMENT: CPT | Performed by: RADIOLOGY

## 2025-03-04 PROCEDURE — 77336 RADIATION PHYSICS CONSULT: CPT

## 2025-03-04 PROCEDURE — 77385 HC NTSTY MODUL RAD TX DLVR SMPL: CPT

## 2025-03-05 ENCOUNTER — HOSPITAL ENCOUNTER (OUTPATIENT)
Facility: HOSPITAL | Age: 70
Setting detail: RECURRING SERIES
Discharge: HOME | End: 2025-03-08
Attending: RADIOLOGY
Payer: MEDICARE

## 2025-03-05 LAB
RAD ONC ARIA COURSE FIRST TREATMENT DATE: NORMAL
RAD ONC ARIA COURSE ID: NORMAL
RAD ONC ARIA COURSE INTENT: NORMAL
RAD ONC ARIA COURSE LAST TREATMENT DATE: NORMAL
RAD ONC ARIA COURSE SESSION NUMBER: 21
RAD ONC ARIA COURSE START DATE: NORMAL
RAD ONC ARIA COURSE TREATMENT ELAPSED DAYS: 30
RAD ONC ARIA PLAN FRACTIONS TREATED TO DATE: 21
RAD ONC ARIA PLAN ID: NORMAL
RAD ONC ARIA PLAN PRESCRIBED DOSE PER FRACTION: 2.5 GY
RAD ONC ARIA PLAN PRIMARY REFERENCE POINT: NORMAL
RAD ONC ARIA PLAN TOTAL FRACTIONS PRESCRIBED: 28
RAD ONC ARIA PLAN TOTAL PRESCRIBED DOSE: 7000 CGY
RAD ONC ARIA REFERENCE POINT DOSAGE GIVEN TO DATE: 52.5 GY
RAD ONC ARIA REFERENCE POINT ID: NORMAL
RAD ONC ARIA REFERENCE POINT SESSION DOSAGE GIVEN: 2.5 GY

## 2025-03-05 PROCEDURE — 77014 CHG CT GUIDANCE RADIATION THERAPY FLDS PLACEMENT: CPT | Performed by: RADIOLOGY

## 2025-03-05 PROCEDURE — 77385 HC NTSTY MODUL RAD TX DLVR SMPL: CPT

## 2025-03-06 ENCOUNTER — HOSPITAL ENCOUNTER (OUTPATIENT)
Facility: HOSPITAL | Age: 70
Setting detail: RECURRING SERIES
Discharge: HOME | End: 2025-03-09
Attending: RADIOLOGY
Payer: MEDICARE

## 2025-03-06 LAB
RAD ONC ARIA COURSE FIRST TREATMENT DATE: NORMAL
RAD ONC ARIA COURSE ID: NORMAL
RAD ONC ARIA COURSE INTENT: NORMAL
RAD ONC ARIA COURSE LAST TREATMENT DATE: NORMAL
RAD ONC ARIA COURSE SESSION NUMBER: 22
RAD ONC ARIA COURSE START DATE: NORMAL
RAD ONC ARIA COURSE TREATMENT ELAPSED DAYS: 31
RAD ONC ARIA PLAN FRACTIONS TREATED TO DATE: 22
RAD ONC ARIA PLAN ID: NORMAL
RAD ONC ARIA PLAN PRESCRIBED DOSE PER FRACTION: 2.5 GY
RAD ONC ARIA PLAN PRIMARY REFERENCE POINT: NORMAL
RAD ONC ARIA PLAN TOTAL FRACTIONS PRESCRIBED: 28
RAD ONC ARIA PLAN TOTAL PRESCRIBED DOSE: 7000 CGY
RAD ONC ARIA REFERENCE POINT DOSAGE GIVEN TO DATE: 55 GY
RAD ONC ARIA REFERENCE POINT ID: NORMAL
RAD ONC ARIA REFERENCE POINT SESSION DOSAGE GIVEN: 2.5 GY

## 2025-03-06 PROCEDURE — 77385 HC NTSTY MODUL RAD TX DLVR SMPL: CPT

## 2025-03-06 PROCEDURE — 77014 CHG CT GUIDANCE RADIATION THERAPY FLDS PLACEMENT: CPT | Performed by: RADIOLOGY

## 2025-03-07 ENCOUNTER — HOSPITAL ENCOUNTER (OUTPATIENT)
Facility: HOSPITAL | Age: 70
Setting detail: RECURRING SERIES
Discharge: HOME | End: 2025-03-10
Attending: RADIOLOGY
Payer: MEDICARE

## 2025-03-07 LAB
RAD ONC ARIA COURSE FIRST TREATMENT DATE: NORMAL
RAD ONC ARIA COURSE ID: NORMAL
RAD ONC ARIA COURSE INTENT: NORMAL
RAD ONC ARIA COURSE LAST TREATMENT DATE: NORMAL
RAD ONC ARIA COURSE SESSION NUMBER: 23
RAD ONC ARIA COURSE START DATE: NORMAL
RAD ONC ARIA COURSE TREATMENT ELAPSED DAYS: 32
RAD ONC ARIA PLAN FRACTIONS TREATED TO DATE: 23
RAD ONC ARIA PLAN ID: NORMAL
RAD ONC ARIA PLAN PRESCRIBED DOSE PER FRACTION: 2.5 GY
RAD ONC ARIA PLAN PRIMARY REFERENCE POINT: NORMAL
RAD ONC ARIA PLAN TOTAL FRACTIONS PRESCRIBED: 28
RAD ONC ARIA PLAN TOTAL PRESCRIBED DOSE: 7000 CGY
RAD ONC ARIA REFERENCE POINT DOSAGE GIVEN TO DATE: 57.5 GY
RAD ONC ARIA REFERENCE POINT ID: NORMAL
RAD ONC ARIA REFERENCE POINT SESSION DOSAGE GIVEN: 2.5 GY

## 2025-03-07 PROCEDURE — 77385 HC NTSTY MODUL RAD TX DLVR SMPL: CPT

## 2025-03-10 ENCOUNTER — HOSPITAL ENCOUNTER (OUTPATIENT)
Facility: HOSPITAL | Age: 70
Setting detail: RECURRING SERIES
Discharge: HOME | End: 2025-03-13
Attending: RADIOLOGY
Payer: MEDICARE

## 2025-03-10 LAB
RAD ONC ARIA COURSE FIRST TREATMENT DATE: NORMAL
RAD ONC ARIA COURSE ID: NORMAL
RAD ONC ARIA COURSE INTENT: NORMAL
RAD ONC ARIA COURSE LAST TREATMENT DATE: NORMAL
RAD ONC ARIA COURSE SESSION NUMBER: 24
RAD ONC ARIA COURSE START DATE: NORMAL
RAD ONC ARIA COURSE TREATMENT ELAPSED DAYS: 35
RAD ONC ARIA PLAN FRACTIONS TREATED TO DATE: 24
RAD ONC ARIA PLAN ID: NORMAL
RAD ONC ARIA PLAN PRESCRIBED DOSE PER FRACTION: 2.5 GY
RAD ONC ARIA PLAN PRIMARY REFERENCE POINT: NORMAL
RAD ONC ARIA PLAN TOTAL FRACTIONS PRESCRIBED: 28
RAD ONC ARIA PLAN TOTAL PRESCRIBED DOSE: 7000 CGY
RAD ONC ARIA REFERENCE POINT DOSAGE GIVEN TO DATE: 60 GY
RAD ONC ARIA REFERENCE POINT ID: NORMAL
RAD ONC ARIA REFERENCE POINT SESSION DOSAGE GIVEN: 2.5 GY

## 2025-03-10 PROCEDURE — 77014 CHG CT GUIDANCE RADIATION THERAPY FLDS PLACEMENT: CPT | Performed by: RADIOLOGY

## 2025-03-10 PROCEDURE — 77385 HC NTSTY MODUL RAD TX DLVR SMPL: CPT

## 2025-03-11 ENCOUNTER — HOSPITAL ENCOUNTER (OUTPATIENT)
Facility: HOSPITAL | Age: 70
Setting detail: RECURRING SERIES
Discharge: HOME | End: 2025-03-14
Attending: RADIOLOGY
Payer: MEDICARE

## 2025-03-11 LAB
RAD ONC ARIA COURSE FIRST TREATMENT DATE: NORMAL
RAD ONC ARIA COURSE ID: NORMAL
RAD ONC ARIA COURSE INTENT: NORMAL
RAD ONC ARIA COURSE LAST TREATMENT DATE: NORMAL
RAD ONC ARIA COURSE SESSION NUMBER: 25
RAD ONC ARIA COURSE START DATE: NORMAL
RAD ONC ARIA COURSE TREATMENT ELAPSED DAYS: 36
RAD ONC ARIA PLAN FRACTIONS TREATED TO DATE: 25
RAD ONC ARIA PLAN ID: NORMAL
RAD ONC ARIA PLAN PRESCRIBED DOSE PER FRACTION: 2.5 GY
RAD ONC ARIA PLAN PRIMARY REFERENCE POINT: NORMAL
RAD ONC ARIA PLAN TOTAL FRACTIONS PRESCRIBED: 28
RAD ONC ARIA PLAN TOTAL PRESCRIBED DOSE: 7000 CGY
RAD ONC ARIA REFERENCE POINT DOSAGE GIVEN TO DATE: 62.5 GY
RAD ONC ARIA REFERENCE POINT ID: NORMAL
RAD ONC ARIA REFERENCE POINT SESSION DOSAGE GIVEN: 2.5 GY

## 2025-03-11 PROCEDURE — 77385 HC NTSTY MODUL RAD TX DLVR SMPL: CPT

## 2025-03-11 PROCEDURE — 77014 CHG CT GUIDANCE RADIATION THERAPY FLDS PLACEMENT: CPT | Performed by: RADIOLOGY

## 2025-03-11 PROCEDURE — 77336 RADIATION PHYSICS CONSULT: CPT

## 2025-03-12 ENCOUNTER — HOSPITAL ENCOUNTER (OUTPATIENT)
Facility: HOSPITAL | Age: 70
Setting detail: RECURRING SERIES
Discharge: HOME | End: 2025-03-15
Attending: RADIOLOGY
Payer: MEDICARE

## 2025-03-12 LAB
RAD ONC ARIA COURSE FIRST TREATMENT DATE: NORMAL
RAD ONC ARIA COURSE ID: NORMAL
RAD ONC ARIA COURSE INTENT: NORMAL
RAD ONC ARIA COURSE LAST TREATMENT DATE: NORMAL
RAD ONC ARIA COURSE SESSION NUMBER: 26
RAD ONC ARIA COURSE START DATE: NORMAL
RAD ONC ARIA COURSE TREATMENT ELAPSED DAYS: 37
RAD ONC ARIA PLAN FRACTIONS TREATED TO DATE: 26
RAD ONC ARIA PLAN ID: NORMAL
RAD ONC ARIA PLAN PRESCRIBED DOSE PER FRACTION: 2.5 GY
RAD ONC ARIA PLAN PRIMARY REFERENCE POINT: NORMAL
RAD ONC ARIA PLAN TOTAL FRACTIONS PRESCRIBED: 28
RAD ONC ARIA PLAN TOTAL PRESCRIBED DOSE: 7000 CGY
RAD ONC ARIA REFERENCE POINT DOSAGE GIVEN TO DATE: 65 GY
RAD ONC ARIA REFERENCE POINT ID: NORMAL
RAD ONC ARIA REFERENCE POINT SESSION DOSAGE GIVEN: 2.5 GY

## 2025-03-12 PROCEDURE — 77385 HC NTSTY MODUL RAD TX DLVR SMPL: CPT

## 2025-03-12 PROCEDURE — 77014 CHG CT GUIDANCE RADIATION THERAPY FLDS PLACEMENT: CPT | Performed by: RADIOLOGY

## 2025-03-13 ENCOUNTER — HOSPITAL ENCOUNTER (OUTPATIENT)
Facility: HOSPITAL | Age: 70
Setting detail: RECURRING SERIES
Discharge: HOME | End: 2025-03-16
Attending: RADIOLOGY
Payer: MEDICARE

## 2025-03-13 LAB
RAD ONC ARIA COURSE FIRST TREATMENT DATE: NORMAL
RAD ONC ARIA COURSE ID: NORMAL
RAD ONC ARIA COURSE INTENT: NORMAL
RAD ONC ARIA COURSE LAST TREATMENT DATE: NORMAL
RAD ONC ARIA COURSE SESSION NUMBER: 27
RAD ONC ARIA COURSE START DATE: NORMAL
RAD ONC ARIA COURSE TREATMENT ELAPSED DAYS: 38
RAD ONC ARIA PLAN FRACTIONS TREATED TO DATE: 27
RAD ONC ARIA PLAN ID: NORMAL
RAD ONC ARIA PLAN PRESCRIBED DOSE PER FRACTION: 2.5 GY
RAD ONC ARIA PLAN PRIMARY REFERENCE POINT: NORMAL
RAD ONC ARIA PLAN TOTAL FRACTIONS PRESCRIBED: 28
RAD ONC ARIA PLAN TOTAL PRESCRIBED DOSE: 7000 CGY
RAD ONC ARIA REFERENCE POINT DOSAGE GIVEN TO DATE: 67.5 GY
RAD ONC ARIA REFERENCE POINT ID: NORMAL
RAD ONC ARIA REFERENCE POINT SESSION DOSAGE GIVEN: 2.5 GY

## 2025-03-13 PROCEDURE — 77385 HC NTSTY MODUL RAD TX DLVR SMPL: CPT

## 2025-03-13 PROCEDURE — 77014 CHG CT GUIDANCE RADIATION THERAPY FLDS PLACEMENT: CPT | Performed by: RADIOLOGY

## 2025-03-14 ENCOUNTER — HOSPITAL ENCOUNTER (OUTPATIENT)
Facility: HOSPITAL | Age: 70
Setting detail: RECURRING SERIES
Discharge: HOME | End: 2025-03-17
Attending: RADIOLOGY
Payer: MEDICARE

## 2025-03-14 LAB
RAD ONC ARIA COURSE FIRST TREATMENT DATE: NORMAL
RAD ONC ARIA COURSE ID: NORMAL
RAD ONC ARIA COURSE INTENT: NORMAL
RAD ONC ARIA COURSE LAST TREATMENT DATE: NORMAL
RAD ONC ARIA COURSE SESSION NUMBER: 28
RAD ONC ARIA COURSE START DATE: NORMAL
RAD ONC ARIA COURSE TREATMENT ELAPSED DAYS: 39
RAD ONC ARIA PLAN FRACTIONS TREATED TO DATE: 28
RAD ONC ARIA PLAN ID: NORMAL
RAD ONC ARIA PLAN PRESCRIBED DOSE PER FRACTION: 2.5 GY
RAD ONC ARIA PLAN PRIMARY REFERENCE POINT: NORMAL
RAD ONC ARIA PLAN TOTAL FRACTIONS PRESCRIBED: 28
RAD ONC ARIA PLAN TOTAL PRESCRIBED DOSE: 7000 CGY
RAD ONC ARIA REFERENCE POINT DOSAGE GIVEN TO DATE: 70 GY
RAD ONC ARIA REFERENCE POINT ID: NORMAL
RAD ONC ARIA REFERENCE POINT SESSION DOSAGE GIVEN: 2.5 GY

## 2025-03-14 PROCEDURE — 77385 HC NTSTY MODUL RAD TX DLVR SMPL: CPT

## 2025-03-14 PROCEDURE — 77014 CHG CT GUIDANCE RADIATION THERAPY FLDS PLACEMENT: CPT | Performed by: RADIOLOGY

## 2025-04-13 SDOH — ECONOMIC STABILITY: INCOME INSECURITY: IN THE LAST 12 MONTHS, WAS THERE A TIME WHEN YOU WERE NOT ABLE TO PAY THE MORTGAGE OR RENT ON TIME?: NO

## 2025-04-13 SDOH — ECONOMIC STABILITY: FOOD INSECURITY: WITHIN THE PAST 12 MONTHS, YOU WORRIED THAT YOUR FOOD WOULD RUN OUT BEFORE YOU GOT MONEY TO BUY MORE.: NEVER TRUE

## 2025-04-13 SDOH — ECONOMIC STABILITY: TRANSPORTATION INSECURITY
IN THE PAST 12 MONTHS, HAS THE LACK OF TRANSPORTATION KEPT YOU FROM MEDICAL APPOINTMENTS OR FROM GETTING MEDICATIONS?: NO

## 2025-04-13 SDOH — ECONOMIC STABILITY: FOOD INSECURITY: WITHIN THE PAST 12 MONTHS, THE FOOD YOU BOUGHT JUST DIDN'T LAST AND YOU DIDN'T HAVE MONEY TO GET MORE.: NEVER TRUE

## 2025-04-13 SDOH — ECONOMIC STABILITY: TRANSPORTATION INSECURITY
IN THE PAST 12 MONTHS, HAS LACK OF TRANSPORTATION KEPT YOU FROM MEETINGS, WORK, OR FROM GETTING THINGS NEEDED FOR DAILY LIVING?: NO

## 2025-04-16 ENCOUNTER — OFFICE VISIT (OUTPATIENT)
Facility: CLINIC | Age: 70
End: 2025-04-16
Payer: MEDICARE

## 2025-04-16 VITALS
BODY MASS INDEX: 37.83 KG/M2 | RESPIRATION RATE: 16 BRPM | OXYGEN SATURATION: 98 % | DIASTOLIC BLOOD PRESSURE: 83 MMHG | WEIGHT: 249.6 LBS | SYSTOLIC BLOOD PRESSURE: 139 MMHG | TEMPERATURE: 98.5 F | HEART RATE: 84 BPM | HEIGHT: 68 IN

## 2025-04-16 DIAGNOSIS — E11.22 TYPE 2 DIABETES MELLITUS WITH STAGE 3A CHRONIC KIDNEY DISEASE, WITH LONG-TERM CURRENT USE OF INSULIN (HCC): Primary | Chronic | ICD-10-CM

## 2025-04-16 DIAGNOSIS — E11.69 HYPERLIPIDEMIA ASSOCIATED WITH TYPE 2 DIABETES MELLITUS: Chronic | ICD-10-CM

## 2025-04-16 DIAGNOSIS — Z79.4 TYPE 2 DIABETES MELLITUS WITH STAGE 3A CHRONIC KIDNEY DISEASE, WITH LONG-TERM CURRENT USE OF INSULIN (HCC): Primary | Chronic | ICD-10-CM

## 2025-04-16 DIAGNOSIS — E78.5 HYPERLIPIDEMIA ASSOCIATED WITH TYPE 2 DIABETES MELLITUS: Chronic | ICD-10-CM

## 2025-04-16 DIAGNOSIS — I10 ESSENTIAL (PRIMARY) HYPERTENSION: Chronic | ICD-10-CM

## 2025-04-16 DIAGNOSIS — C61 PROSTATE CANCER (HCC): Chronic | ICD-10-CM

## 2025-04-16 DIAGNOSIS — N18.31 TYPE 2 DIABETES MELLITUS WITH STAGE 3A CHRONIC KIDNEY DISEASE, WITH LONG-TERM CURRENT USE OF INSULIN (HCC): Primary | Chronic | ICD-10-CM

## 2025-04-16 PROCEDURE — 3079F DIAST BP 80-89 MM HG: CPT | Performed by: FAMILY MEDICINE

## 2025-04-16 PROCEDURE — 99214 OFFICE O/P EST MOD 30 MIN: CPT | Performed by: FAMILY MEDICINE

## 2025-04-16 PROCEDURE — 1123F ACP DISCUSS/DSCN MKR DOCD: CPT | Performed by: FAMILY MEDICINE

## 2025-04-16 PROCEDURE — 3075F SYST BP GE 130 - 139MM HG: CPT | Performed by: FAMILY MEDICINE

## 2025-04-16 RX ORDER — IRBESARTAN 300 MG/1
300 TABLET ORAL DAILY
Qty: 90 TABLET | Refills: 4 | Status: SHIPPED | OUTPATIENT
Start: 2025-04-16

## 2025-04-16 RX ORDER — PIOGLITAZONE 30 MG/1
30 TABLET ORAL DAILY
Qty: 90 TABLET | Refills: 4 | Status: SHIPPED | OUTPATIENT
Start: 2025-04-16

## 2025-04-16 ASSESSMENT — ENCOUNTER SYMPTOMS: RESPIRATORY NEGATIVE: 1

## 2025-04-16 ASSESSMENT — PATIENT HEALTH QUESTIONNAIRE - PHQ9
SUM OF ALL RESPONSES TO PHQ QUESTIONS 1-9: 0
2. FEELING DOWN, DEPRESSED OR HOPELESS: NOT AT ALL
SUM OF ALL RESPONSES TO PHQ QUESTIONS 1-9: 0
SUM OF ALL RESPONSES TO PHQ QUESTIONS 1-9: 0
1. LITTLE INTEREST OR PLEASURE IN DOING THINGS: NOT AT ALL
SUM OF ALL RESPONSES TO PHQ QUESTIONS 1-9: 0

## 2025-04-16 NOTE — PROGRESS NOTES
ASSESSMENT/PLAN:  1. Type 2 diabetes mellitus with stage 3a chronic kidney disease, with long-term current use of insulin (HCC)  Assessment & Plan:   Monitored by specialist- no acute findings meriting change in the plan  Orders:  -     pioglitazone (ACTOS) 30 MG tablet; Take 1 tablet by mouth daily ceived the following from [facility]: Outside name: pioglitazone (ACTOS) 30 mg tablet, Disp-90 tablet, R-4Normal  2. Hyperlipidemia associated with type 2 diabetes mellitus  Assessment & Plan:   Monitored by specialist- no acute findings meriting change in the plan  3. Essential (primary) hypertension  Assessment & Plan:   Chronic, at goal (stable), continue current treatment plan  Orders:  -     irbesartan (AVAPRO) 300 MG tablet; Take 1 tablet by mouth daily ceived the following from [facility]: Outside name: irbesartan (AVAPRO) 300 mg tablet, Disp-90 tablet, R-4Normal  4. Prostate cancer (HCC)  Assessment & Plan:   Monitored by specialist- no acute findings meriting change in the plan        Return in about 4 months (around 8/16/2025) for DM, HTN, HLD, (no labs), specialist eval.      SUBJECTIVE/OBJECTIVE:    Chief Complaint   Patient presents with    Diabetes    Hypertension    Cholesterol Problem         HPI    Tavon Luther is a 70 y.o. male presenting today for    4 months  follow up of dm, htn, hld, ckd.  Pt has been doing well overall.   Pt cont to f/u with endo regularly; his most recent A1c was 6.0.    Patient had labs on 1/3/25.  Labs reviewed in detail with patient     Pt has completed radiation tx for his prostate cancer.  He will cont to f/u with uro for injections.      Patient does need medication refills today.      New concerns today: none        Review of Systems   Constitutional: Negative.    HENT: Negative.     Respiratory: Negative.     Cardiovascular: Negative.    All other systems reviewed and are negative.      Physical Exam  Vitals and nursing note reviewed. 
since your last visit?\"    NO    “Have you seen or consulted any other health care providers outside of [facility] since your last visit?”    NO          Click Here for Release of Records Request

## 2025-04-28 DIAGNOSIS — D64.9 ANEMIA, UNSPECIFIED TYPE: ICD-10-CM

## 2025-04-28 DIAGNOSIS — D69.6 LOW PLATELET COUNT: Primary | ICD-10-CM

## 2025-04-28 NOTE — PROGRESS NOTES
Abn plt count and mild anemia noted on labs drawn by uro for f/u of prostate ca.  Will refer to hematology for further eval.  Please notify pt and spouse.

## 2025-04-29 ENCOUNTER — TELEPHONE (OUTPATIENT)
Facility: CLINIC | Age: 70
End: 2025-04-29

## 2025-04-29 ENCOUNTER — HOSPITAL ENCOUNTER (OUTPATIENT)
Facility: HOSPITAL | Age: 70
Setting detail: RECURRING SERIES
Discharge: HOME | End: 2025-05-02
Attending: RADIOLOGY

## 2025-04-29 NOTE — TELEPHONE ENCOUNTER
Patient wife took the call. I explained that his referral was processed and they should be receiving a call within this week or early next week.

## 2025-05-22 ENCOUNTER — CARE COORDINATION (OUTPATIENT)
Facility: CLINIC | Age: 70
End: 2025-05-22

## 2025-05-22 ENCOUNTER — ENROLLMENT (OUTPATIENT)
Facility: CLINIC | Age: 70
End: 2025-05-22

## 2025-05-22 NOTE — CARE COORDINATION
Care Transitions Note    Initial Call - Call within 2 business days of discharge: Yes    Attempted to reach patient, spouse/partner , (PHI form verified; on file) for transitions of care follow up. Unable to reach patient, spouse/partner .    Outreach Attempts:   HIPAA compliant voicemail left for patient, spouse/partner .   [employer] message sent.     Patient: Tavon Luther    Patient : 1955   MRN: [id number]    Reason for Admission: JOSY, Sepsis  Discharge Date: 20  RURS: No data recorded  Last Discharge Facility       None            Was this an external facility discharge? Yes. Discharge Date: 25. Facility Name: [facility]    Follow Up Appointment:   Patient has hospital follow up appointment scheduled within 7 days of discharge.    Future Appointments         Provider Specialty Dept Phone    2025 9:15 AM Mai Higgins MD Family Medicine [phone]    2025 3:20 PM [facility] NURSE Urology [phone]    2025 8:30 AM Mai Higgins MD Family Medicine [phone]    2025 10:00 AM Jose Craft MD Urology [phone]    2026 3:30 PM Nate Mejias MD Radiation Therapy [phone]            Plan for follow-up on next business day.      Amirah Washington RN

## 2025-05-23 ENCOUNTER — CARE COORDINATION (OUTPATIENT)
Facility: CLINIC | Age: 70
End: 2025-05-23

## 2025-05-23 ENCOUNTER — OFFICE VISIT (OUTPATIENT)
Facility: CLINIC | Age: 70
End: 2025-05-23

## 2025-05-23 VITALS
SYSTOLIC BLOOD PRESSURE: 130 MMHG | BODY MASS INDEX: 37.74 KG/M2 | DIASTOLIC BLOOD PRESSURE: 77 MMHG | WEIGHT: 249 LBS | TEMPERATURE: 97.8 F | HEART RATE: 89 BPM | OXYGEN SATURATION: 95 % | HEIGHT: 68 IN | RESPIRATION RATE: 13 BRPM

## 2025-05-23 DIAGNOSIS — R41.0 DISORIENTATION: ICD-10-CM

## 2025-05-23 DIAGNOSIS — Z09 HOSPITAL DISCHARGE FOLLOW-UP: Primary | ICD-10-CM

## 2025-05-23 DIAGNOSIS — R19.7 DIARRHEA, UNSPECIFIED TYPE: ICD-10-CM

## 2025-05-23 DIAGNOSIS — N17.9 AKI (ACUTE KIDNEY INJURY): ICD-10-CM

## 2025-05-23 SDOH — ECONOMIC STABILITY: FOOD INSECURITY: WITHIN THE PAST 12 MONTHS, THE FOOD YOU BOUGHT JUST DIDN'T LAST AND YOU DIDN'T HAVE MONEY TO GET MORE.: NEVER TRUE

## 2025-05-23 SDOH — ECONOMIC STABILITY: FOOD INSECURITY: WITHIN THE PAST 12 MONTHS, YOU WORRIED THAT YOUR FOOD WOULD RUN OUT BEFORE YOU GOT MONEY TO BUY MORE.: NEVER TRUE

## 2025-05-23 ASSESSMENT — PATIENT HEALTH QUESTIONNAIRE - PHQ9
SUM OF ALL RESPONSES TO PHQ QUESTIONS 1-9: 0
1. LITTLE INTEREST OR PLEASURE IN DOING THINGS: NOT AT ALL
SUM OF ALL RESPONSES TO PHQ QUESTIONS 1-9: 0
2. FEELING DOWN, DEPRESSED OR HOPELESS: NOT AT ALL

## 2025-05-23 NOTE — PROGRESS NOTES
Tvaon Luther presents today for   Chief Complaint   Patient presents with    Follow-Up from Hospital       Is someone accompanying this pt? no    Is the patient using any DME equipment during OV? no    Depression Screenin/23/2025     9:04 AM 2025     8:35 AM 2024     1:18 PM 2024     8:45 AM 2024     8:48 AM 2024    11:12 AM 2023    10:45 AM   PHQ-9 Questionaire   Little interest or pleasure in doing things 0 0 0 1 0 0 0   Feeling down, depressed, or hopeless 0 0 0 0 0 0 0   PHQ-9 Total Score 0 0 0 1 0 0 0        GUILLERMO 7-Anxiety        No data to display                   Learning Assessment:  No question data found.     Fall Risk       No data to display                   Travel Screening:    Travel Screening       Question Response    Have you been in contact with someone who was sick? No / Unsure    Do you have any of the following new or worsening symptoms? None of these    Have you traveled internationally or domestically in the last month? No          Travel History   Travel since 25    No documented travel since 25            Health Maintenance reviewed and discussed and ordered per Provider.  Transportation Needs: No Transportation Needs (2025)    PRAPARE - Transportation     Lack of Transportation (Medical): No     Lack of Transportation (Non-Medical): No      Food Insecurity: No Food Insecurity (2025)    Hunger Vital Sign     Worried About Running Out of Food in the Last Year: Never true     Ran Out of Food in the Last Year: Never true     Financial Resource Strain: Patient Declined (2024)    Overall Financial Resource Strain (CARDIA)     Difficulty of Paying Living Expenses: Patient declined     Housing Stability: Low Risk  (2025)    Housing Stability Vital Sign     Unable to Pay for Housing in the Last Year: No     Number of Times Moved in the Last Year: 0     Homeless in the Last Year: No       Did you provide resources if

## 2025-05-23 NOTE — CARE COORDINATION
Care Transitions Note    Initial Call - Call within 2 business days of discharge: Yes    Patient Current Location:  Home: [address]  [place] [address]    Care Transition Nurse contacted the patient by telephone to perform post hospital discharge assessment, verified name and  as identifiers.  Provided introduction to self, and explanation of the Care Transition Nurse role.    Patient: Tavon Luther    Patient : 1955   MRN: [id number]    Reason for Admission:  JOSY, Sepsis  Discharge Date: 20  RURS: No data recorded    Last Discharge Facility       None            Was this an external facility discharge? Yes. Discharge Date: 25. Facility Name: [facility]    Additional needs identified to be addressed with provider   No needs identified             Method of communication with provider: none.    Patients top risk factors for readmission: medical condition-JOSY, Sepsis    Interventions to address risk factors:   Education: CTN educated patient on JOSY red flags.    Reviewed/educated on s/s of JOSY to monitor and report:  Decreased urine output (Too little urine leaving the body).  Swelling in legs, ankles, and around the eyes.  Fatigue or tiredness.  Shortness of breath.  Confusion.  Nausea.  Seizures.  Chest pain or pressure.     Care Summary Note: Patient reported he feels great. Was seen by PCP this morning. Denied urinary issues.     Care Transition Nurse reviewed discharge instructions and red flags with patient. The patient was given an opportunity to ask questions; all questions answered at this time.. The patient verbalized understanding.   Were discharge instructions available to patient? Yes.   Reviewed appropriate site of care based on symptoms and resources available to patient including: PCP  CTN . The patient agrees to contact the primary care provider and/or specialist office for questions related to their healthcare.      Advance Care Planning:   Does patient

## 2025-05-23 NOTE — PROGRESS NOTES
Post-Discharge Transitional Care Follow Up    Tavno Luther   YOB: 1955    Date of Office Visit:  5/23/2025  Date of Hospital Admission: 5/18/25  Date of Hospital Discharge: 5/21/25    Care management risk score Rising risk (score 2-5) and Complex Care (Scores >=6): No Risk Score On File     Non face to face  following discharge, date last encounter closed (first attempt may have been earlier): 05/23/2025     Call initiated 2 business days of discharge: Yes    ASSESSMENT/PLAN:   Hospital discharge follow-up  -     ID DISCHARGE MEDS RECONCILED W/ CURRENT OUTPATIENT MED LIST  Disorientation  resolved    JOSY (acute kidney injury)  Resolved; suspect related to dehydration due to diarrhea    Diarrhea, unspecified type  Etiology unclear; resolved at this time.    Medical Decision Making: moderate complexity  No follow-ups on file.           Subjective:   HPI    Inpatient course: Discharge summary reviewed- see chart.    \"Discharge Summary   Admit Date: 5/18/2025  Discharge Date: 5/21/2025, 12:46 PM    Patient ID:  Tavon Luther  70 y.o.  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

## 2025-05-30 ENCOUNTER — CARE COORDINATION (OUTPATIENT)
Facility: CLINIC | Age: 70
End: 2025-05-30

## 2025-05-30 NOTE — CARE COORDINATION
Care Transitions Note    Follow Up Call     Patient Current Location:  Home: [address]  [place] [address]    Care Transition Nurse contacted the patient by telephone. Verified name and  as identifiers.    Additional needs identified to be addressed with provider   Patient reported weak urine stream. Patient voiced he has not taken Flomax since discharge. Denied incomplete emptying of bladder. CTN instructed patient to resume.          Method of communication with provider: chart routing.    Care Summary Note: Patient voiced he has been okay. Denied JOSY red flags. Patient reported weak urine stream. Patient reported he has not taken Flomax since discharge.     Plan of care updates since last contact:  Review of patient management of conditions/medications: CTN instructed patient to resume Flomax as prescribed.        Advance Care Planning:   Does patient have an Advance Directive: not on file; education provided - Patient voiced he is working on completing and   Primary Decision Maker: Clara Luther - Spouse - [phone]    Secondary Decision Maker: BrianTricia - Child - [phone] .    Medication Review:  No changes since last call.     Remote Patient Monitoring:  Offered patient enrollment in the Remote Patient Monitoring (RPM) program for in-home monitoring: Yes, but did not enroll at this time: controlled chronic disease management. CKD stable; creatinine 1.18. DM stable; A1c 6.0     Assessments:  No changes since last call    Follow Up Appointment:   KIRSTIE appointment attended as scheduled   Future Appointments         Provider Specialty Dept Phone    2025 3:20 PM [facility] NURSE Urology [phone]    2025 8:30 AM Mai Higgins MD Family Medicine [phone]    2025 10:00 AM Jose Craft MD Urology [phone]    2026 3:30 PM Nate Mejias MD Radiation Therapy [phone]            Care Transition Nurse provided contact information.  Plan

## 2025-06-06 ENCOUNTER — CARE COORDINATION (OUTPATIENT)
Facility: CLINIC | Age: 70
End: 2025-06-06

## 2025-06-06 NOTE — CARE COORDINATION
Care Transitions Note    Follow Up Call     Attempted to reach patient for transitions of care follow up.  Unable to reach patient.      Outreach Attempts:   HIPAA compliant voicemail left for patient, spouse/partner .     Care Summary Note: No changes noted    Follow Up Appointment:   Future Appointments         Provider Specialty Dept Phone    6/12/2025 3:20 PM [facility] NURSE Urology [phone]    7/3/2025 9:00 AM Mai Higgins MD Family Medicine [phone]    8/4/2025 10:00 AM Jose Craft MD Urology [phone]    4/30/2026 3:30 PM Nate Mejias MD Radiation Therapy [phone]            Plan for follow-up call in 6-10 days based on severity of symptoms and risk factors. Plan for next call: symptom management-assess JOSY red flags  medication management-confirm resumption of Flomax 0.4 mg BID      Effie Mcdonald LPN

## 2025-06-07 DIAGNOSIS — K21.9 GASTRO-ESOPHAGEAL REFLUX DISEASE WITHOUT ESOPHAGITIS: ICD-10-CM

## 2025-06-09 RX ORDER — OMEPRAZOLE 40 MG/1
40 CAPSULE, DELAYED RELEASE ORAL DAILY
Qty: 90 CAPSULE | Refills: 1 | Status: SHIPPED | OUTPATIENT
Start: 2025-06-09

## 2025-06-13 ENCOUNTER — CARE COORDINATION (OUTPATIENT)
Facility: CLINIC | Age: 70
End: 2025-06-13

## 2025-06-13 NOTE — CARE COORDINATION
Care Transitions Note    Follow Up Call     Attempted to reach patient for transitions of care follow up.  Unable to reach patient.      Outreach Attempts:   HIPAA compliant voicemail left for patient.     Care Summary Note: Patient recently admitted to [facility] 5/18-5/21: JOSY, Sepsis. During last follow up, patient reported weak urine stream. Patient voiced he had not resumed Flomax since discharge. CTN advised patient to resume. Patient was seen in urology office 6/12/25 for Camcevi injection.     Follow Up Appointment:   Future Appointments         Provider Specialty Dept Phone    7/3/2025 9:00 AM Mai Higgins MD Family Medicine [phone]    8/4/2025 10:00 AM Jose Craft MD Urology [phone]    12/12/2025 3:40 PM [facility] NURSE Urology [phone]    4/30/2026 3:30 PM Nate Mejias MD Radiation Therapy [phone]            Plan for follow-up call in 6-10 days based on severity of symptoms and risk factors. Plan for next call: assess condition and for needs    Amirah Washington RN

## 2025-06-23 ENCOUNTER — CARE COORDINATION (OUTPATIENT)
Facility: CLINIC | Age: 70
End: 2025-06-23

## 2025-06-23 NOTE — CARE COORDINATION
Care Transitions Note    Final Call     Attempted to reach patient, spouse/partner  for transitions of care follow up.  Unable to reach patient, spouse/partner .      Outreach Attempts:   HIPAA compliant voicemail left for patient, spouse/partner .     Patient graduated from the Care Transitions program on 6/23/25.  Patient/family has the ability to self-manage at this time..      Handoff:   Patient was not referred to the ACM team due to unable to contact patient.      Care Summary Note: No changes noted.    Assessments:  Care Transitions Subsequent and Final Call    Subsequent and Final Calls  Care Transitions Interventions  No Identified Needs  Other Interventions:              Upcoming Appointments:    Future Appointments         Provider Specialty Dept Phone    7/3/2025 9:00 AM Mai Higgins MD Family Medicine [phone]    8/4/2025 10:00 AM Jose Craft MD Urology [phone]    12/12/2025 3:40 PM [facility] NURSE Urology [phone]    4/30/2026 3:30 PM Nate Mejias MD Radiation Therapy [phone]            Effie Mcdonald LPN

## 2025-07-17 DIAGNOSIS — C61 MALIGNANT NEOPLASM OF PROSTATE (HCC): Primary | ICD-10-CM

## 2025-07-25 NOTE — TELEPHONE ENCOUNTER
Last seen 5/23/2025   Last labs   Last filled  8/7/24  Next appointment Visit date not scheduled    Lab Results   Component Value Date     04/21/2025    K 4.6 04/21/2025     (H) 04/21/2025    CO2 23 04/21/2025    BUN 26 04/21/2025    CREATININE 1.18 04/21/2025    GLUCOSE 141 (H) 04/21/2025    CALCIUM 9.7 04/21/2025    BILITOT <0.2 04/21/2025    ALKPHOS 103 04/21/2025    AST 23 04/21/2025    ALT 23 04/21/2025    LABGLOM 66 04/21/2025    GFRAA >60.0 10/11/2021    AGRATIO 1.1 10/11/2021    GLOB 3.9 10/11/2021

## 2025-07-30 DIAGNOSIS — M25.50 ARTHRALGIA, UNSPECIFIED JOINT: ICD-10-CM

## 2025-07-30 NOTE — TELEPHONE ENCOUNTER
Last seen 5/23/2025   Last labs   Last filled  7/5/24  Next appointment 8/12/2025     Lab Results   Component Value Date     04/21/2025    K 4.6 04/21/2025     (H) 04/21/2025    CO2 23 04/21/2025    BUN 26 04/21/2025    CREATININE 1.18 04/21/2025    GLUCOSE 141 (H) 04/21/2025    CALCIUM 9.7 04/21/2025    BILITOT <0.2 04/21/2025    ALKPHOS 103 04/21/2025    AST 23 04/21/2025    ALT 23 04/21/2025    LABGLOM 66 04/21/2025    GFRAA >60.0 10/11/2021    AGRATIO 1.1 10/11/2021    GLOB 3.9 10/11/2021

## 2025-07-30 NOTE — TELEPHONE ENCOUNTER
Caller requesting refill be sent to [facility] in [place]        allopurinol (ZYLOPRIM) 100 MG tablet [[id number]]    ibuprofen (ADVIL;MOTRIN) 800 MG tablet [[id number]]

## 2025-07-31 RX ORDER — ALLOPURINOL 100 MG/1
100 TABLET ORAL DAILY
Qty: 90 TABLET | Refills: 3 | Status: SHIPPED | OUTPATIENT
Start: 2025-07-31

## 2025-07-31 RX ORDER — IBUPROFEN 800 MG/1
TABLET, FILM COATED ORAL
Qty: 120 TABLET | Refills: 5 | Status: SHIPPED | OUTPATIENT
Start: 2025-07-31

## 2025-08-12 ENCOUNTER — TELEMEDICINE (OUTPATIENT)
Facility: CLINIC | Age: 70
End: 2025-08-12

## 2025-08-12 DIAGNOSIS — Z79.4 TYPE 2 DIABETES MELLITUS WITH STAGE 3A CHRONIC KIDNEY DISEASE, WITH LONG-TERM CURRENT USE OF INSULIN (HCC): Chronic | ICD-10-CM

## 2025-08-12 DIAGNOSIS — E11.69 HYPERLIPIDEMIA ASSOCIATED WITH TYPE 2 DIABETES MELLITUS (HCC): Chronic | ICD-10-CM

## 2025-08-12 DIAGNOSIS — E78.5 HYPERLIPIDEMIA ASSOCIATED WITH TYPE 2 DIABETES MELLITUS (HCC): Chronic | ICD-10-CM

## 2025-08-12 DIAGNOSIS — N18.31 TYPE 2 DIABETES MELLITUS WITH STAGE 3A CHRONIC KIDNEY DISEASE, WITH LONG-TERM CURRENT USE OF INSULIN (HCC): Chronic | ICD-10-CM

## 2025-08-12 DIAGNOSIS — Z71.89 ACP (ADVANCE CARE PLANNING): ICD-10-CM

## 2025-08-12 DIAGNOSIS — H54.8 LEGALLY BLIND: ICD-10-CM

## 2025-08-12 DIAGNOSIS — I10 ESSENTIAL (PRIMARY) HYPERTENSION: Chronic | ICD-10-CM

## 2025-08-12 DIAGNOSIS — Z00.00 MEDICARE ANNUAL WELLNESS VISIT, SUBSEQUENT: Primary | ICD-10-CM

## 2025-08-12 DIAGNOSIS — E11.22 TYPE 2 DIABETES MELLITUS WITH STAGE 3A CHRONIC KIDNEY DISEASE, WITH LONG-TERM CURRENT USE OF INSULIN (HCC): Chronic | ICD-10-CM

## 2025-08-12 ASSESSMENT — PATIENT HEALTH QUESTIONNAIRE - PHQ9
SUM OF ALL RESPONSES TO PHQ QUESTIONS 1-9: 0
SUM OF ALL RESPONSES TO PHQ QUESTIONS 1-9: 0
1. LITTLE INTEREST OR PLEASURE IN DOING THINGS: NOT AT ALL
2. FEELING DOWN, DEPRESSED OR HOPELESS: NOT AT ALL
SUM OF ALL RESPONSES TO PHQ QUESTIONS 1-9: 0
SUM OF ALL RESPONSES TO PHQ QUESTIONS 1-9: 0

## 2025-08-12 ASSESSMENT — ENCOUNTER SYMPTOMS: RESPIRATORY NEGATIVE: 1

## 2025-09-02 ENCOUNTER — CLINICAL DOCUMENTATION (OUTPATIENT)
Facility: CLINIC | Age: 70
End: 2025-09-02